# Patient Record
Sex: MALE | Race: WHITE | Employment: OTHER | URBAN - METROPOLITAN AREA
[De-identification: names, ages, dates, MRNs, and addresses within clinical notes are randomized per-mention and may not be internally consistent; named-entity substitution may affect disease eponyms.]

---

## 2020-07-18 ENCOUNTER — APPOINTMENT (OUTPATIENT)
Dept: GENERAL RADIOLOGY | Age: 85
DRG: 863 | End: 2020-07-18
Payer: MEDICARE

## 2020-07-18 ENCOUNTER — HOSPITAL ENCOUNTER (INPATIENT)
Age: 85
LOS: 3 days | Discharge: HOME | DRG: 863 | End: 2020-07-21
Attending: EMERGENCY MEDICINE | Admitting: INTERNAL MEDICINE
Payer: MEDICARE

## 2020-07-18 PROBLEM — L03.119 CELLULITIS OF HAND: Status: ACTIVE | Noted: 2020-07-18

## 2020-07-18 LAB
ANION GAP SERPL CALCULATED.3IONS-SCNC: 10 MMOL/L (ref 3–16)
BASOPHILS ABSOLUTE: 0.1 K/UL (ref 0–0.2)
BASOPHILS RELATIVE PERCENT: 0.5 %
BUN BLDV-MCNC: 34 MG/DL (ref 7–20)
C-REACTIVE PROTEIN: 98.5 MG/L (ref 0–5.1)
CALCIUM SERPL-MCNC: 10.1 MG/DL (ref 8.3–10.6)
CHLORIDE BLD-SCNC: 101 MMOL/L (ref 99–110)
CO2: 27 MMOL/L (ref 21–32)
CREAT SERPL-MCNC: 1.7 MG/DL (ref 0.8–1.3)
EOSINOPHILS ABSOLUTE: 0.3 K/UL (ref 0–0.6)
EOSINOPHILS RELATIVE PERCENT: 2.1 %
GFR AFRICAN AMERICAN: 46
GFR NON-AFRICAN AMERICAN: 38
GLUCOSE BLD-MCNC: 133 MG/DL (ref 70–99)
HCT VFR BLD CALC: 49.6 % (ref 40.5–52.5)
HEMOGLOBIN: 16.3 G/DL (ref 13.5–17.5)
INR BLD: 3.52 (ref 0.86–1.14)
LYMPHOCYTES ABSOLUTE: 1.2 K/UL (ref 1–5.1)
LYMPHOCYTES RELATIVE PERCENT: 7.4 %
MCH RBC QN AUTO: 27.9 PG (ref 26–34)
MCHC RBC AUTO-ENTMCNC: 32.9 G/DL (ref 31–36)
MCV RBC AUTO: 84.9 FL (ref 80–100)
MONOCYTES ABSOLUTE: 1 K/UL (ref 0–1.3)
MONOCYTES RELATIVE PERCENT: 6.4 %
NEUTROPHILS ABSOLUTE: 13.3 K/UL (ref 1.7–7.7)
NEUTROPHILS RELATIVE PERCENT: 83.6 %
PDW BLD-RTO: 15.2 % (ref 12.4–15.4)
PLATELET # BLD: 140 K/UL (ref 135–450)
PMV BLD AUTO: 7.8 FL (ref 5–10.5)
POTASSIUM REFLEX MAGNESIUM: 4.7 MMOL/L (ref 3.5–5.1)
PROTHROMBIN TIME: 41.3 SEC (ref 10–13.2)
RBC # BLD: 5.84 M/UL (ref 4.2–5.9)
SEDIMENTATION RATE, ERYTHROCYTE: 11 MM/HR (ref 0–20)
SODIUM BLD-SCNC: 138 MMOL/L (ref 136–145)
WBC # BLD: 15.9 K/UL (ref 4–11)

## 2020-07-18 PROCEDURE — 85025 COMPLETE CBC W/AUTO DIFF WBC: CPT

## 2020-07-18 PROCEDURE — 36415 COLL VENOUS BLD VENIPUNCTURE: CPT

## 2020-07-18 PROCEDURE — 99284 EMERGENCY DEPT VISIT MOD MDM: CPT

## 2020-07-18 PROCEDURE — 85610 PROTHROMBIN TIME: CPT

## 2020-07-18 PROCEDURE — 73090 X-RAY EXAM OF FOREARM: CPT

## 2020-07-18 PROCEDURE — 1200000000 HC SEMI PRIVATE

## 2020-07-18 PROCEDURE — 6360000002 HC RX W HCPCS: Performed by: STUDENT IN AN ORGANIZED HEALTH CARE EDUCATION/TRAINING PROGRAM

## 2020-07-18 PROCEDURE — 2580000003 HC RX 258: Performed by: STUDENT IN AN ORGANIZED HEALTH CARE EDUCATION/TRAINING PROGRAM

## 2020-07-18 PROCEDURE — 73070 X-RAY EXAM OF ELBOW: CPT

## 2020-07-18 PROCEDURE — 96365 THER/PROPH/DIAG IV INF INIT: CPT

## 2020-07-18 PROCEDURE — 2580000003 HC RX 258: Performed by: INTERNAL MEDICINE

## 2020-07-18 PROCEDURE — 80048 BASIC METABOLIC PNL TOTAL CA: CPT

## 2020-07-18 PROCEDURE — 6370000000 HC RX 637 (ALT 250 FOR IP): Performed by: INTERNAL MEDICINE

## 2020-07-18 PROCEDURE — 86140 C-REACTIVE PROTEIN: CPT

## 2020-07-18 PROCEDURE — 85652 RBC SED RATE AUTOMATED: CPT

## 2020-07-18 PROCEDURE — 73120 X-RAY EXAM OF HAND: CPT

## 2020-07-18 RX ORDER — SODIUM CHLORIDE 0.9 % (FLUSH) 0.9 %
10 SYRINGE (ML) INJECTION EVERY 12 HOURS SCHEDULED
Status: DISCONTINUED | OUTPATIENT
Start: 2020-07-18 | End: 2020-07-21 | Stop reason: HOSPADM

## 2020-07-18 RX ORDER — LANOLIN ALCOHOL/MO/W.PET/CERES
1000 CREAM (GRAM) TOPICAL DAILY
COMMUNITY

## 2020-07-18 RX ORDER — PROMETHAZINE HYDROCHLORIDE 12.5 MG/1
12.5 TABLET ORAL EVERY 6 HOURS PRN
Status: DISCONTINUED | OUTPATIENT
Start: 2020-07-18 | End: 2020-07-21 | Stop reason: HOSPADM

## 2020-07-18 RX ORDER — UREA 10 %
3 LOTION (ML) TOPICAL NIGHTLY PRN
Status: DISCONTINUED | OUTPATIENT
Start: 2020-07-18 | End: 2020-07-21 | Stop reason: HOSPADM

## 2020-07-18 RX ORDER — DIPHENHYDRAMINE HYDROCHLORIDE, ZINC ACETATE 2; .1 G/100G; G/100G
CREAM TOPICAL 3 TIMES DAILY PRN
Status: DISCONTINUED | OUTPATIENT
Start: 2020-07-18 | End: 2020-07-21 | Stop reason: HOSPADM

## 2020-07-18 RX ORDER — CALCIUM CARBONATE 200(500)MG
1 TABLET,CHEWABLE ORAL 3 TIMES DAILY PRN
COMMUNITY
End: 2022-01-11 | Stop reason: ALTCHOICE

## 2020-07-18 RX ORDER — PANTOPRAZOLE SODIUM 40 MG/1
40 TABLET, DELAYED RELEASE ORAL DAILY PRN
Status: DISCONTINUED | OUTPATIENT
Start: 2020-07-19 | End: 2020-07-21 | Stop reason: HOSPADM

## 2020-07-18 RX ORDER — ONDANSETRON 2 MG/ML
4 INJECTION INTRAMUSCULAR; INTRAVENOUS EVERY 6 HOURS PRN
Status: DISCONTINUED | OUTPATIENT
Start: 2020-07-18 | End: 2020-07-21 | Stop reason: HOSPADM

## 2020-07-18 RX ORDER — LEVOTHYROXINE SODIUM 0.12 MG/1
125 TABLET ORAL DAILY
Status: DISCONTINUED | OUTPATIENT
Start: 2020-07-19 | End: 2020-07-21 | Stop reason: HOSPADM

## 2020-07-18 RX ORDER — ACETAMINOPHEN 650 MG/1
650 SUPPOSITORY RECTAL EVERY 6 HOURS PRN
Status: DISCONTINUED | OUTPATIENT
Start: 2020-07-18 | End: 2020-07-21 | Stop reason: HOSPADM

## 2020-07-18 RX ORDER — SUVOREXANT 10 MG/1
10 TABLET, FILM COATED ORAL NIGHTLY PRN
COMMUNITY
End: 2022-01-11 | Stop reason: ALTCHOICE

## 2020-07-18 RX ORDER — LORAZEPAM 0.5 MG/1
0.5 TABLET ORAL NIGHTLY PRN
Status: DISCONTINUED | OUTPATIENT
Start: 2020-07-18 | End: 2020-07-21 | Stop reason: HOSPADM

## 2020-07-18 RX ORDER — LANOLIN ALCOHOL/MO/W.PET/CERES
3 CREAM (GRAM) TOPICAL NIGHTLY PRN
COMMUNITY
End: 2022-01-11 | Stop reason: ALTCHOICE

## 2020-07-18 RX ORDER — POLYETHYLENE GLYCOL 3350 17 G/17G
17 POWDER, FOR SOLUTION ORAL DAILY PRN
Status: DISCONTINUED | OUTPATIENT
Start: 2020-07-18 | End: 2020-07-21 | Stop reason: HOSPADM

## 2020-07-18 RX ORDER — FUROSEMIDE 40 MG/1
40 TABLET ORAL DAILY
Status: DISCONTINUED | OUTPATIENT
Start: 2020-07-18 | End: 2020-07-19

## 2020-07-18 RX ORDER — FINASTERIDE 5 MG/1
5 TABLET, FILM COATED ORAL DAILY
Status: DISCONTINUED | OUTPATIENT
Start: 2020-07-19 | End: 2020-07-21 | Stop reason: HOSPADM

## 2020-07-18 RX ORDER — ACETAMINOPHEN 500 MG
500 TABLET ORAL NIGHTLY PRN
COMMUNITY
End: 2022-01-11 | Stop reason: ALTCHOICE

## 2020-07-18 RX ORDER — LANSOPRAZOLE 15 MG/1
15 CAPSULE, DELAYED RELEASE ORAL DAILY PRN
Status: ON HOLD | COMMUNITY
End: 2021-02-27

## 2020-07-18 RX ORDER — OYSTER SHELL CALCIUM WITH VITAMIN D 500; 200 MG/1; [IU]/1
1 TABLET, FILM COATED ORAL DAILY
COMMUNITY

## 2020-07-18 RX ORDER — LANOLIN ALCOHOL/MO/W.PET/CERES
1000 CREAM (GRAM) TOPICAL DAILY
Status: DISCONTINUED | OUTPATIENT
Start: 2020-07-19 | End: 2020-07-21 | Stop reason: HOSPADM

## 2020-07-18 RX ORDER — CALCIUM CARBONATE 200(500)MG
1 TABLET,CHEWABLE ORAL 3 TIMES DAILY PRN
Status: DISCONTINUED | OUTPATIENT
Start: 2020-07-18 | End: 2020-07-21 | Stop reason: HOSPADM

## 2020-07-18 RX ORDER — FEBUXOSTAT 80 MG/1
80 TABLET, FILM COATED ORAL DAILY PRN
COMMUNITY
End: 2022-01-11 | Stop reason: ALTCHOICE

## 2020-07-18 RX ORDER — OMEGA-3S/DHA/EPA/FISH OIL/D3 300MG-1000
400 CAPSULE ORAL DAILY
Status: DISCONTINUED | OUTPATIENT
Start: 2020-07-19 | End: 2020-07-21 | Stop reason: HOSPADM

## 2020-07-18 RX ORDER — ACETAMINOPHEN 325 MG/1
650 TABLET ORAL EVERY 6 HOURS PRN
Status: DISCONTINUED | OUTPATIENT
Start: 2020-07-18 | End: 2020-07-21 | Stop reason: HOSPADM

## 2020-07-18 RX ORDER — SODIUM CHLORIDE 0.9 % (FLUSH) 0.9 %
10 SYRINGE (ML) INJECTION PRN
Status: DISCONTINUED | OUTPATIENT
Start: 2020-07-18 | End: 2020-07-21 | Stop reason: HOSPADM

## 2020-07-18 RX ADMIN — VANCOMYCIN HYDROCHLORIDE 1000 MG: 10 INJECTION, POWDER, LYOPHILIZED, FOR SOLUTION INTRAVENOUS at 14:55

## 2020-07-18 RX ADMIN — ACETAMINOPHEN 650 MG: 325 TABLET ORAL at 20:39

## 2020-07-18 RX ADMIN — LORAZEPAM 0.5 MG: 0.5 TABLET ORAL at 23:21

## 2020-07-18 RX ADMIN — Medication 10 ML: at 20:40

## 2020-07-18 RX ADMIN — Medication 3 MG: at 20:38

## 2020-07-18 ASSESSMENT — ENCOUNTER SYMPTOMS
COUGH: 0
ALLERGIC/IMMUNOLOGIC NEGATIVE: 1
EYES NEGATIVE: 1
SHORTNESS OF BREATH: 0
GASTROINTESTINAL NEGATIVE: 1

## 2020-07-18 ASSESSMENT — PAIN SCALES - GENERAL
PAINLEVEL_OUTOF10: 0
PAINLEVEL_OUTOF10: 0
PAINLEVEL_OUTOF10: 3

## 2020-07-18 ASSESSMENT — PAIN DESCRIPTION - ONSET: ONSET: ON-GOING

## 2020-07-18 ASSESSMENT — PAIN DESCRIPTION - PROGRESSION: CLINICAL_PROGRESSION: NOT CHANGED

## 2020-07-18 ASSESSMENT — PAIN DESCRIPTION - PAIN TYPE: TYPE: ACUTE PAIN

## 2020-07-18 ASSESSMENT — PAIN DESCRIPTION - LOCATION: LOCATION: HAND

## 2020-07-18 ASSESSMENT — PAIN DESCRIPTION - FREQUENCY: FREQUENCY: CONTINUOUS

## 2020-07-18 ASSESSMENT — PAIN DESCRIPTION - DESCRIPTORS: DESCRIPTORS: ACHING

## 2020-07-18 ASSESSMENT — PAIN - FUNCTIONAL ASSESSMENT: PAIN_FUNCTIONAL_ASSESSMENT: PREVENTS OR INTERFERES SOME ACTIVE ACTIVITIES AND ADLS

## 2020-07-18 ASSESSMENT — PAIN DESCRIPTION - ORIENTATION: ORIENTATION: LEFT

## 2020-07-18 NOTE — PLAN OF CARE
Problem: Pain:  Goal: Patient's pain/discomfort is manageable  Description: Patient's pain/discomfort is manageable  Outcome: Ongoing   Patient denies pain at this time. LUE elevated on pillows for comfort. Will continue to assess and monitor. Problem: Falls - Risk of:  Goal: Will remain free from falls  Description: Will remain free from falls  Outcome: Ongoing   All fall precautions in place. Bed locked and in lowest position with alarm on. Over-bed table and personal belongings within reach. Patient instructed to use call light for assistance and educated on importance of fall precautions. Non-skid socks on. Will continue to monitor.

## 2020-07-18 NOTE — PROGRESS NOTES
4 Eyes Admission Assessment     I agree as the admission nurse that 2 RN's have performed a thorough Head to Toe Skin Assessment on the patient. ALL assessment sites listed below have been assessed on admission. Areas assessed by both nurses:  [x]   Head, Face, and Ears   [x]   Shoulders, Back, and Chest  [x]   Arms, Elbows, and Hands   [x]   Coccyx, Sacrum, and Ischum  [x]   Legs, Feet, and Heels        Does the Patient have Skin Breakdown?   .   Cellulitis to L hand per admission  Abrasion to R knee  Abrasion to R forehead  Scattered bruising to BUE      Dryness in spots to back    Henrique Prevention initiated:  NA   Wound Care Orders initiated:  NA      WOC nurse consulted for Pressure Injury (Stage 3,4, Unstageable, DTI, NWPT, and Complex wounds):  NA      Nurse 1 eSignature: Electronically signed by Mariza Doherty RN on 7/18/20 at 4:00 PM EDT    **SHARE this note so that the co-signing nurse is able to place an eSignature**    Nurse 2 eSignature: Electronically signed by Norvin Siemens, RN on 7/18/20 at 4:41 PM EDT

## 2020-07-18 NOTE — PROGRESS NOTES
Patient alert and oriented x4, VSS on room air, neuro status WNL. LUE is red and swollen, fingers are cool, +2 radial pulse, patient reports tenderness to touch. Patient denies N/V. Patient up to bathroom x1 SBA with GB. Tolerating ambulation well. All fall precautions in place. Will continue to monitor.

## 2020-07-18 NOTE — PROGRESS NOTES
List of Home Medications the patient is currently taking is complete. Source of medications in list is recent MD visits, [employer] fill history and patient interview. The following medications were added to admission medication list:  - Febuxostat 80 mg daily PRN  - Melatonin 3 mg HS PRN  - APAP 500 mg HS PRN  - B12 1000 mcg daily  - D3 400 units daily  - Lansoprazole 15 mg daily PRN  - CoQ10 300 mg daily  - Suvorexant 10 mg nightly PRN  - Tums TID PRN    The following medications were removed from admission medication list:  - Amlodipine 5 mg daily  - Carvedilol 6.25 mg BID  - Lisinopril 20 daily  - Trazodone 50 mg nightly PRN    The following medication instructions/doses were adjusted to reflect how patient is taking:  - Eszopiclone 2 mg nightly PRN adjusted to 2-4 mg nightly PRN  - Furosemide 40 mg BID adjusted to 40 mg BID PRN  - Levothyroxine 100 mcg adjusted to 125 mcg daily    Please note:  - Patient reported taking all medications today, including warfarin. Please call with questions.   Lisa Colunga, PharmD, [facility]  Main pharmacy: [id number]  7/18/2020 3:01 PM

## 2020-07-18 NOTE — CONSULTS
Clinical Pharmacy Consult Note    Admit date: 7/18/2020    Subjective/Objective:  80 yom with PMHx significant for BPH, pacemaker, HTN, hypothyroidism, gout, Afib on warfarin, CKD3, and ILD presented with c/o cellulitis infection on his L arm. Patient reportedly saw dermatologist last Wednesday where he had two excisional biopsies done on potential squamous cell carcinoma lesions on his R lateral knee and R forehead. Pharmacy is consulted to dose Vancomycin and Warfarin per Dr. Angle Chaudhari    Pertinent Medications:  Vancomycin 1g IV q24h -- day # 1    Home anticoagulation regimen:  Warfarin 5 mg daily (per patient interview)    Date INR Warfarin Dose   7/18 3.52 5 mg (taken at home)                      Recent Labs     07/18/20  1405      K 4.7      CO2 27   BUN 34*   CREATININE 1.7*       CrCl cannot be calculated (Unknown ideal weight. ). Recent Labs     07/18/20  1405   WBC 15.9*   HGB 16.3   HCT 49.6   MCV 84.9          Assessment/Plan:  1. L arm cellulitis  :  vancomycin day #1  Vancomycin - pharmacy to dose  · Patient received vancomycin 1g in ED today. Will start 1g IV q24h to begin tomorrow. Provides ~ 12 mg/kg and is based on a half-life elimination of 22 hours. · Trough will be ordered when appropriate. Target 10-15 mcg/mL for cellulitis. · Renal function will be monitored closely and dosing will be adjusted as appropriate. 2. Anticoagulation:  Atrial fibrillation (INR goal 2-3)  · Home regimen: warfarin 5 mg daily; patient took dose today per interview  · INR today = 3.52, supratherapeutic  · Will order warfarin placeholder. · Medication profile reviewed for potential drug interactions. No significant drug interactions with warfarin noted. · Daily INR will be monitored and dose adjustments made as needed. Please call with any questions.   Richard Castellano, PharmD, BCPS  Main pharmacy: [id number]  7/18/2020 3:33 PM

## 2020-07-18 NOTE — ED PROVIDER NOTES
1 [facility]  EMERGENCY DEPARTMENT ENCOUNTER          [facility] RESIDENT NOTE       Date of evaluation: 7/18/2020    Chief Complaint     Hand Pain (redness, swelling to left had after dermatology procedure on Wednesday)      History of Present Illness     Apryl Yun is a 80 y.o. male who presents c/o cellulitis infection on his L arm. He reports that saw his dermatologist last wed where he had two excisional biopsies done on potential squamous cell carcinma lesions on his R lateral knee and R forehead, as well as a cryotherpathy treatment for a lesion over his L second knuckle. Everyday since then he has reported that the L arm has gotten a little more red and swollen and has become hot to touch. He went back to the dermatologist several times since this started happening and was prescribed a z-pack yesterday. He took one 250mg tablet last night, forgetting to take the second dose, so he took it this morning. Any movement of his L hand or pressure over the redness causes pain. Nothing has made it better. He has a PMH of BPH, pacemaker, HTN, hypothyroidism, gout, HLP, afib, CKD stage 3, ILD. Review of Systems     Review of Systems   Constitutional: Negative for chills and fever. HENT: Negative. Eyes: Negative. Respiratory: Negative for cough and shortness of breath. Cardiovascular: Negative for chest pain and palpitations. Gastrointestinal: Negative. Endocrine: Negative. Genitourinary: Negative. Musculoskeletal: Positive for joint swelling. Negative for arthralgias, myalgias, neck pain and neck stiffness. Allergic/Immunologic: Negative. Neurological: Negative. Negative for weakness and numbness. All other systems reviewed and are negative.       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 He has a past surgical history that includes Spine surgery (2007); Skin cancer excision; Tibia fracture surgery; and Skin cancer excision (2012). His family history is not on file. He reports that he quit smoking about 57 years ago. His smoking use included cigarettes. He has a 10.00 pack-year smoking history. He has never used smokeless tobacco. He reports current alcohol use. He reports that he does not use drugs. Medications     Previous Medications    ACETAMINOPHEN (TYLENOL) 500 MG TABLET    Take 500 mg by mouth nightly as needed for Pain    CALCIUM CARBONATE (TUMS) 500 MG CHEWABLE TABLET    Take 1 tablet by mouth 3 times daily as needed for Heartburn    CALCIUM-VITAMIN D (OSCAL-500) 500-200 MG-UNIT PER TABLET    Take 1 tablet by mouth daily    CHOLECALCIFEROL 400 UNIT TABS TABLET    Take 400 Units by mouth daily    COENZYME Q10 (EQL COQ10) 300 MG CAPS    Take 300 mg by mouth daily    ESZOPICLONE (LUNESTA) 2 MG TABS    Take 1 tablet by mouth nightly    FEBUXOSTAT 80 MG TABS    Take 80 mg by mouth daily as needed    FINASTERIDE (PROSCAR) 5 MG TABLET    Take 5 mg by mouth daily     FISH OIL    Take 500 mg by mouth daily     FUROSEMIDE (LASIX) 40 MG TABLET    TAKE 1 TABLET BY MOUTH TWICE DAILY    GLUCOSE BLOOD VI TEST STRIPS (FREESTYLE LITE) STRIP    Test once daily    LANSOPRAZOLE (PREVACID) 15 MG DELAYED RELEASE CAPSULE    Take 15 mg by mouth daily as needed    LEVOTHYROXINE (SYNTHROID) 100 MCG TABLET    Take 1 tablet by mouth daily. MELATONIN 3 MG TABS TABLET    Take 3 mg by mouth nightly as needed    MOMETASONE (NASONEX) 50 MCG/ACT NASAL SPRAY    2 sprays by Nasal route daily. MULTIPLE VITAMIN (MULTIVITAMIN PO)    Take 1 tablet by mouth     SIMVASTATIN (ZOCOR) 10 MG TABLET    Take 1 tablet by mouth nightly. SUVOREXANT (BELSOMRA) 10 MG TABS    Take 10 mg by mouth nightly as needed.     TEMAZEPAM (RESTORIL) 15 MG CAPSULE    TAKE ONE CAPSULE BY MOUTH EVERY NIGHT AT BEDTIME AS NEEDED fist due to the swelling and pain. Neurological:      General: No focal deficit present. Mental Status: He is alert and oriented to person, place, and time. Mental status is at baseline. Cranial Nerves: No cranial nerve deficit. Motor: No weakness. Gait: Gait normal.   Psychiatric:         Mood and Affect: Mood normal.         Diagnostic Results     RADIOLOGY:  XR ELBOW LEFT (2 VIEWS)   Final Result      1. No acute osseous findings. 2.  Diffuse swelling. XR RADIUS ULNA LEFT (2 VIEWS)   Final Result      1. No acute osseous findings. 2.  Diffuse swelling. XR HAND LEFT (2 VIEWS)   Final Result      1. No acute osseous findings. 2.  Diffuse swelling.           LABS:   Results for orders placed or performed during the hospital encounter of 07/18/20   CBC Auto Differential   Result Value Ref Range    WBC 15.9 (H) 4.0 - 11.0 K/uL    RBC 5.84 4.20 - 5.90 M/uL    Hemoglobin 16.3 13.5 - 17.5 g/dL    Hematocrit 49.6 40.5 - 52.5 %    MCV 84.9 80.0 - 100.0 fL    MCH 27.9 26.0 - 34.0 pg    MCHC 32.9 31.0 - 36.0 g/dL    RDW 15.2 12.4 - 15.4 %    Platelets 308 874 - 754 K/uL    MPV 7.8 5.0 - 10.5 fL    Neutrophils % 83.6 %    Lymphocytes % 7.4 %    Monocytes % 6.4 %    Eosinophils % 2.1 %    Basophils % 0.5 %    Neutrophils Absolute 13.3 (H) 1.7 - 7.7 K/uL    Lymphocytes Absolute 1.2 1.0 - 5.1 K/uL    Monocytes Absolute 1.0 0.0 - 1.3 K/uL    Eosinophils Absolute 0.3 0.0 - 0.6 K/uL    Basophils Absolute 0.1 0.0 - 0.2 K/uL   Basic Metabolic Panel w/ Reflex to MG   Result Value Ref Range    Sodium 138 136 - 145 mmol/L    Potassium reflex Magnesium 4.7 3.5 - 5.1 mmol/L    Chloride 101 99 - 110 mmol/L    CO2 27 21 - 32 mmol/L    Anion Gap 10 3 - 16    Glucose 133 (H) 70 - 99 mg/dL    BUN 34 (H) 7 - 20 mg/dL    CREATININE 1.7 (H) 0.8 - 1.3 mg/dL    GFR Non- 38 (A) >60    GFR  46 (A) >60    Calcium 10.1 8.3 - 10.6 mg/dL         RECENT VITALS:  BP: (!) 159/80, Temp: 98.2 °F (36.8 °C),Pulse: 62, Resp: 18, SpO2: 97 %       ED Course     Nursing Notes, Past Medical Hx, Past Surgical Hx, Social Hx, Allergies, and FamilyHx were reviewed. The patient was giventhe following medications:  Orders Placed This Encounter   Medications    vancomycin (VANCOCIN) 1,000 mg in dextrose 5 % 250 mL IVPB       CONSULTS:  IP CONSULT TO HOSPITALIST  IP CONSULT TO ORTHOPEDIC SURGERY  IP CONSULT  W [address] [place] [address] / ASSESSMENT / Rj Hamman is a 80 y.o. male c/o cellulitis infection on his L arm which started after he got a cryotherapy treatment for a possible squamous cell carcinoma lesion last wed. Everyday since then he has reported that the L arm has gotten a little more red and swollen and has become hot to touch. Dermatologist prescribed a z-pack yesterday. He took one 250mg tablet last night, forgetting to take the second dose, so he took it this morning. Any movement of his L hand or pressure over the redness causes pain. Pt denies fevers, chills, night sweats. He has a PMH of BPH, pacemaker, HTN, hypothyroidism, gout, HLP, afib, CKD stage 3, ILD. Today he is afebrile and hemo stable. On physical exam pt L hand is swollen and erythematous. The rash begins in the L hand and progresses throughout the dorsal aspect of his hand and fingers, progressing up his dorsal forearm and half way up his biceps. There is an ulcerative lesion over the L second finger MTP jt where the cryotherapy was done. Pt has 2+ radial pulse in L hand, no numbness or tingling, is hot to touch, and has almost full ROM with the exception being unable to make a fully enclosed fist due to swelling and pain. I have marked and dated the border of erythema. At this time there doesn't appear to be any concern for compartment syndrome and infectious tenosynovitis. I have ordered a CBC to evaluate for leukocytosis, a BMP to evaluate for renal function due to hx CKD stage 3.  Pt has a leukocytosis of 15.9, BUN of 39, Cr 1.7 and GFr of 38. Xrays of L hand, forearm and elbow reveal no gas in tissues, necrotizing fasciitis unlikely. Pt allergic to penicillin (believes anaphylactic), as well as cephalosporins, sulfa drugs, and ciprofloxin, but pt unsure of what reactions he has to these. I have started him on 1g IV vancomycin and will consult hospitalist and orthopedic surgery to get a hand surgeon on treatment team. Hospitalist agreed to admit pt. Dr. Enrique Richmond has agreed to follow pt as orthopedic surgeon. Pt agreed with plan. This patient was also evaluated by the attending physician. All care plans were discussed and agreed upon. Clinical Impression     1.  Cellulitis of left hand        Disposition     PATIENT REFERRED TO:  Deyvi Blanco MD  [address] [place] [address]  [phone]            DISCHARGE MEDICATIONS:  New Prescriptions    No medications on file       DISPOSITION Admitted 07/18/2020 02:55:07 PM      Braeden Krishnamurthy,   07/18/20 [id number]

## 2020-07-19 LAB
ANION GAP SERPL CALCULATED.3IONS-SCNC: 9 MMOL/L (ref 3–16)
BUN BLDV-MCNC: 34 MG/DL (ref 7–20)
CALCIUM SERPL-MCNC: 9.6 MG/DL (ref 8.3–10.6)
CHLORIDE BLD-SCNC: 103 MMOL/L (ref 99–110)
CO2: 25 MMOL/L (ref 21–32)
CREAT SERPL-MCNC: 2 MG/DL (ref 0.8–1.3)
GFR AFRICAN AMERICAN: 38
GFR NON-AFRICAN AMERICAN: 32
GLUCOSE BLD-MCNC: 113 MG/DL (ref 70–99)
HCT VFR BLD CALC: 46.9 % (ref 40.5–52.5)
HEMOGLOBIN: 15.4 G/DL (ref 13.5–17.5)
INR BLD: 3.25 (ref 0.86–1.14)
MCH RBC QN AUTO: 27.9 PG (ref 26–34)
MCHC RBC AUTO-ENTMCNC: 32.8 G/DL (ref 31–36)
MCV RBC AUTO: 84.9 FL (ref 80–100)
PDW BLD-RTO: 15.2 % (ref 12.4–15.4)
PLATELET # BLD: 137 K/UL (ref 135–450)
PMV BLD AUTO: 7.8 FL (ref 5–10.5)
POTASSIUM REFLEX MAGNESIUM: 4.4 MMOL/L (ref 3.5–5.1)
PROTHROMBIN TIME: 38.1 SEC (ref 10–13.2)
RBC # BLD: 5.52 M/UL (ref 4.2–5.9)
SODIUM BLD-SCNC: 137 MMOL/L (ref 136–145)
WBC # BLD: 14.5 K/UL (ref 4–11)

## 2020-07-19 PROCEDURE — 6370000000 HC RX 637 (ALT 250 FOR IP): Performed by: INTERNAL MEDICINE

## 2020-07-19 PROCEDURE — 87070 CULTURE OTHR SPECIMN AEROBIC: CPT

## 2020-07-19 PROCEDURE — 85610 PROTHROMBIN TIME: CPT

## 2020-07-19 PROCEDURE — 87040 BLOOD CULTURE FOR BACTERIA: CPT

## 2020-07-19 PROCEDURE — 86403 PARTICLE AGGLUT ANTBDY SCRN: CPT

## 2020-07-19 PROCEDURE — 1200000000 HC SEMI PRIVATE

## 2020-07-19 PROCEDURE — 87186 SC STD MICRODIL/AGAR DIL: CPT

## 2020-07-19 PROCEDURE — 87077 CULTURE AEROBIC IDENTIFY: CPT

## 2020-07-19 PROCEDURE — 6360000002 HC RX W HCPCS: Performed by: INTERNAL MEDICINE

## 2020-07-19 PROCEDURE — 36415 COLL VENOUS BLD VENIPUNCTURE: CPT

## 2020-07-19 PROCEDURE — 85027 COMPLETE CBC AUTOMATED: CPT

## 2020-07-19 PROCEDURE — 2580000003 HC RX 258: Performed by: INTERNAL MEDICINE

## 2020-07-19 PROCEDURE — 87205 SMEAR GRAM STAIN: CPT

## 2020-07-19 PROCEDURE — 80048 BASIC METABOLIC PNL TOTAL CA: CPT

## 2020-07-19 RX ORDER — ZOLPIDEM TARTRATE 5 MG/1
5 TABLET ORAL ONCE
Status: COMPLETED | OUTPATIENT
Start: 2020-07-19 | End: 2020-07-19

## 2020-07-19 RX ORDER — ZOLPIDEM TARTRATE 5 MG/1
5 TABLET ORAL NIGHTLY PRN
Status: DISCONTINUED | OUTPATIENT
Start: 2020-07-20 | End: 2020-07-19

## 2020-07-19 RX ORDER — WARFARIN SODIUM 5 MG/1
2.5 TABLET ORAL
Status: COMPLETED | OUTPATIENT
Start: 2020-07-19 | End: 2020-07-19

## 2020-07-19 RX ADMIN — CHOLECALCIFEROL (VITAMIN D3) 10 MCG (400 UNIT) TABLET 400 UNITS: at 08:13

## 2020-07-19 RX ADMIN — LORAZEPAM 0.5 MG: 0.5 TABLET ORAL at 20:37

## 2020-07-19 RX ADMIN — FINASTERIDE 5 MG: 5 TABLET, FILM COATED ORAL at 08:13

## 2020-07-19 RX ADMIN — Medication 10 ML: at 08:13

## 2020-07-19 RX ADMIN — POLYETHYLENE GLYCOL 3350 17 G: 17 POWDER, FOR SOLUTION ORAL at 14:34

## 2020-07-19 RX ADMIN — LEVOTHYROXINE SODIUM 125 MCG: 0.12 TABLET ORAL at 08:12

## 2020-07-19 RX ADMIN — CYANOCOBALAMIN TAB 1000 MCG 1000 MCG: 1000 TAB at 08:13

## 2020-07-19 RX ADMIN — VANCOMYCIN HYDROCHLORIDE 1000 MG: 10 INJECTION, POWDER, LYOPHILIZED, FOR SOLUTION INTRAVENOUS at 14:35

## 2020-07-19 RX ADMIN — WARFARIN SODIUM 2.5 MG: 5 TABLET ORAL at 17:28

## 2020-07-19 RX ADMIN — Medication 10 ML: at 21:36

## 2020-07-19 RX ADMIN — ZOLPIDEM TARTRATE 5 MG: 5 TABLET ORAL at 23:14

## 2020-07-19 ASSESSMENT — PAIN SCALES - GENERAL: PAINLEVEL_OUTOF10: 0

## 2020-07-19 NOTE — PROGRESS NOTES
Hospitalist Progress Note      PCP: Frida Figueredo MD    Date of Admission: 7/18/2020    Chief Complaint: Left arm pain and swelling    Hospital Course: Admitted for left arm and swelling secondary to cellulitis. Due to patient history of allergies to antibiotics, patient is on IV vancomycin. Orthopedic following    Subjective: Pt doing ok. Denies fever, chills. Still has left arm swelling/redness      Medications:  Reviewed    Infusion Medications   Scheduled Medications    vancomycin  1,000 mg Intravenous Q24H    warfarin (COUMADIN) daily dosing (placeholder)   Other See Admin Instructions    vitamin D3  400 Units Oral Daily    finasteride  5 mg Oral Daily    Coenzyme Q10  300 mg Oral Daily    levothyroxine  125 mcg Oral Daily    vitamin B-12  1,000 mcg Oral Daily    sodium chloride flush  10 mL Intravenous 2 times per day     PRN Meds: calcium carbonate, LORazepam, melatonin, Suvorexant, pantoprazole, sodium chloride flush, acetaminophen **OR** acetaminophen, polyethylene glycol, promethazine **OR** ondansetron, diphenhydrAMINE-zinc acetate      Intake/Output Summary (Last 24 hours) at 7/19/2020 0844  Last data filed at 7/19/2020 0411  Gross per 24 hour   Intake 360 ml   Output --   Net 360 ml       Physical Exam Performed:    /88   Pulse 62   Temp 97.7 °F (36.5 °C) (Oral)   Resp 16   Ht 6' (1.829 m)   Wt 195 lb 1.7 oz (88.5 kg)   SpO2 98%   BMI 26.46 kg/m²     General appearance:  No apparent distress, appears stated age and cooperative. HEENT:  Normal cephalic, atraumatic without obvious deformity. Pupils equal, round, and reactive to light. Extra ocular muscles intact. Conjunctivae/corneas clear. Neck: Supple, with full range of motion. No jugular venous distention. Trachea midline. Respiratory:  Normal respiratory effort. Clear to auscultation, bilaterally without Rales/Wheezes/Rhonchi.   Cardiovascular:  Regular rate and rhythm with normal S1/S2 without murmurs, rubs or gallops. Abdomen: Soft, non-tender, non-distended with normal bowel sounds. Musculoskeletal: Left hand and arm swelling, tenderness, erythema noted, stable. Unable to make left hand fist  Skin: Skin color, texture, turgor normal.  No rashes or lesions. Neurologic:  Neurovascularly intact without any focal sensory/motor deficits. Cranial nerves: II-XII intact, grossly non-focal.  Psychiatric:  Alert and oriented, thought content appropriate, normal insight  Capillary Refill: Brisk,< 3 seconds   Peripheral Pulses: +2 palpable, equal       Labs:   Recent Labs     07/18/20  1405 07/19/20  0459   WBC 15.9* 14.5*   HGB 16.3 15.4   HCT 49.6 46.9    137     Recent Labs     07/18/20  1405 07/19/20  0459    137   K 4.7 4.4    103   CO2 27 25   BUN 34* 34*   CREATININE 1.7* 2.0*   CALCIUM 10.1 9.6     No results for input(s): AST, ALT, BILIDIR, BILITOT, ALKPHOS in the last 72 hours. Recent Labs     07/18/20  1528 07/19/20  0459   INR 3.52* 3.25*     No results for input(s): CKTOTAL, TROPONINI in the last 72 hours. Urinalysis:    No results found for: April Sanchez, BACTERIA, RBCUA, BLOODU, SPECGRAV, [address]    Radiology:  XR ELBOW LEFT (2 VIEWS)   Final Result      1. No acute osseous findings. 2.  Diffuse swelling. XR RADIUS ULNA LEFT (2 VIEWS)   Final Result      1. No acute osseous findings. 2.  Diffuse swelling. XR HAND LEFT (2 VIEWS)   Final Result      1. No acute osseous findings. 2.  Diffuse swelling.               Assessment/Plan:     Cellulitis of left hand:  Likely precipitated by cryotherapy of left second ankle  Blood and wound culture pending  Continue IV antibiotic  Orthopedic following     Atrial fibrillation:  Heart rate controlled   INR supratherapeutic, monitor INR  Cont to hold warfarin, pharmacy following     Hypertension:  Monitor blood pressure  Takes Lasix 40 mg twice daily as needed  S/p one dose lasix 40 mg PO X1     Hypothyroidism:  Continue

## 2020-07-19 NOTE — PLAN OF CARE
Problem: Safety:  Goal: Free from accidental physical injury  Description: Free from accidental physical injury  Outcome: Ongoing   All fall precautions in place. Bed locked and in lowest position with alarm on. Over-bed table and personal belongings within reach. Patient instructed to use call light for assistance. Non-skids socks on. Will continue to monitor. Problem: Pain:  Goal: Patient's pain/discomfort is manageable  Description: Patient's pain/discomfort is manageable  7/19/2020 0937 by Star Judd RN  Outcome: Ongoing   Patient reports pain/tenderness to LUE when touched. Limb elevated on pillows.

## 2020-07-19 NOTE — CONSULTS
Department of Orthopedic Surgery  Attending   Consult Note        Reason for Consult: Left dorsal hand cellulitis  Requesting Physician: Kelvin Webb MD  Date of Service: 7/19/2020 9:28 AM    CHIEF COMPLAINT:  As Above    History Obtained From:  patient    HISTORY OF PRESENT ILLNESS:                The patient is a 80 y.o. male who presents with above chief complaint. He is a right-hand-dominant gentleman who unfortunately recently lost his wife. He has significant medical comorbidities including pacemaker placement for atrial fibrillation and Coumadin usage. 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 He also went excision of several other lesions as well. He developed redness slight drainage and signs of infection. He was initially placed on azithromycin by the dermatologist.  He did not improve but clinically worsened. Presented to the emergency room yesterday. Has been placed on  vancomycin. He reports feeling somewhat better this morning    Past Medical History:        Diagnosis Date    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  Atrial fibrillation ([address] 2006    on event monitor    BPH (benign prostatic hyperplasia)     Gout     HTN (hypertension)     Hyperlipidemia     Hypothyroidism     Pacemaker 2006     Past Surgical History:        Procedure Laterality Date    SKIN CANCER EXCISION      SKIN CANCER EXCISION  2012    melanoma in [address]  2007    L4-5    TIBIA FRACTURE SURGERY           Medications Prior to Admission:   Prior to Admission medications    Medication Sig Start Date End Date Taking?  Authorizing Provider   calcium-vitamin D (OSCAL-500) 500-200 MG-UNIT per tablet Take 1 tablet by mouth daily   Yes Historical Provider, MD   Febuxostat 80 MG TABS Take 80 mg by mouth daily as needed   Yes Historical Provider, MD   melatonin 3 MG TABS tablet Take 3 mg by mouth nightly as needed   Yes Historical Provider, MD   acetaminophen (TYLENOL) 500 MG tablet Take 500 mg by mouth nightly as needed for Pain   Yes Historical Provider, MD   vitamin B-12 (CYANOCOBALAMIN) 1000 MCG tablet Take 1,000 mcg by mouth daily   Yes Historical Provider, MD   Cholecalciferol 400 UNIT TABS tablet Take 400 Units by mouth daily   Yes Historical Provider, MD   lansoprazole (PREVACID) 15 MG delayed release capsule Take 15 mg by mouth daily as needed   Yes Historical Provider, MD   Coenzyme Q10 (EQL COQ10) 300 MG CAPS Take 300 mg by mouth daily   Yes Historical Provider, MD   calcium carbonate (TUMS) 500 MG chewable tablet Take 1 tablet by mouth 3 times daily as needed for Heartburn   Yes Historical Provider, MD   Suvorexant (BELSOMRA) 10 MG TABS Take 10 mg by mouth nightly as needed. Yes Historical Provider, MD   eszopiclone (LUNESTA) 2 MG TABS Take 1 tablet by mouth nightly  Patient taking differently: Take 2-4 mg by mouth nightly. 4/30/15  Yes Darren León MD   temazepam (RESTORIL) 15 MG capsule TAKE ONE CAPSULE BY MOUTH EVERY NIGHT AT BEDTIME AS NEEDED FOR SLEEP 4/1/15  Yes Darren León MD   testosterone enanthate (DELATESTRYL) 200 MG/ML injection USE AS DIRECTED, INJECTING EVERY 2 WEEKS 3/2/15  Yes Historical Provider, MD   levothyroxine (SYNTHROID) 100 MCG tablet Take 1 tablet by mouth daily. Patient taking differently: Take 125 mcg by mouth daily  3/8/14  Yes Darren León MD   warfarin (COUMADIN) 5 MG tablet 5 mg every day 2/12/14  Yes Darren León MD   simvastatin (ZOCOR) 10 MG tablet Take 1 tablet by mouth nightly. 9/13/12  Yes Darren León MD   Multiple Vitamin (MULTIVITAMIN PO) Take 1 tablet by mouth    Yes Historical Provider, MD   FISH OIL Take 500 mg by mouth daily    Yes Historical Provider, MD   finasteride (PROSCAR) 5 MG tablet Take 5 mg by mouth daily  2/7/11  Yes Historical Provider, MD   mometasone (NASONEX) 50 MCG/ACT nasal spray 2 sprays by Nasal route daily.  4/1/15   Emily Mar Diana Hernandez MD   furosemide (LASIX) 40 MG tablet TAKE 1 TABLET BY MOUTH TWICE DAILY  Patient taking differently: 40 mg 2 times daily as needed  1/19/15   Deyvi Blanco MD   glucose blood VI test strips (FREESTYLE LITE) strip Test once daily 12/14/12   Deyvi Blanco MD       Allergies:  Cephalosporins; Ciprofloxacin; Iodides; Penicillins; Sulfa antibiotics; and Sulfamethoxazole-trimethoprim    Social History:    Tobacco:  reports that he quit smoking about 57 years ago. His smoking use included cigarettes. He has a 10.00 pack-year smoking history. He has never used smokeless tobacco.   Alcohol:  reports current alcohol use. Illicit Drug: No  Family History:       Problem Relation Age of Onset    Other Sister         Rheumatic fever       REVIEW OF SYSTEMS:    CONSTITUTIONAL:  negative  MUSCULOSKELETAL:  positive for  pain  All other systems reviewed and negative    PHYSICAL EXAM:    awake, alert, cooperative, no apparent distress, and appears stated age  MUSCULOSKELETAL:  There is no redness, warmth, or swelling of the joints. Full range of motion noted. Motor strength is 5 out of 5 all extremities bilaterally. Tone is normal.  The exception to this with the dorsum of the left hand, he had a small 2 x 2 centimeter lesion of necrotic appearing skin. I removed this gently with some gauze. There was well appearing granulation tissue. There is appreciable layer of subcutaneous edema. No focal purulence or loculated areas on palpation. There is some erythema extending up to the proximal forearm. It is certainly not extended beyond the markings and in fact retreated. He has a normal  Motor function of the radial, median ulnar nerves of the hand as well as sensation. His interphalangeal joints are becoming stiff. He is 3 cm shy of pulp to palm hand flexion.   Brisk capillary refill    DATA:    CBC:   Recent Labs     07/18/20  1405 07/19/20  0459   WBC 15.9* 14.5*   HGB 16.3 15.4    137     BMP:    Recent Labs     07/18/20  1405 07/19/20  0459    137   K 4.7 4.4    103   CO2 27 25   BUN 34* 34*   CREATININE 1.7* 2.0*   GLUCOSE 133* 113*     INR:   Recent Labs     07/18/20  1528 07/19/20  0459   INR 3.52* 3.25*       Radiology:   XR ELBOW LEFT (2 VIEWS)   Final Result      1. No acute osseous findings. 2.  Diffuse swelling. XR RADIUS ULNA LEFT (2 VIEWS)   Final Result      1. No acute osseous findings. 2.  Diffuse swelling. XR HAND LEFT (2 VIEWS)   Final Result      1. No acute osseous findings. 2.  Diffuse swelling. IMPRESSION/RECOMMENDATIONS:    Assessment: LEFT dorsal hand cellulitis    Plan:  1) I unroofed the small lesion to expose well appearing bed of granulation tissue. Examination does not appear to demonstrate any focal or loculated area of purulence. Exam and radiographs demonstrate no subcutaneous crepitation or gas to be consistent with necrotizing fasciitis. That said, I do think this should be treated aggressively. I agree with aggressive IV antibiotics. I would recommend more strict elevation above the level of the heart. Gentle finger range of movement to prevent stiffness. Control of his INR which was quite elevated again today. I informed [place] that I think we need to see convincing evidence of clinical improvement before he is discharged. Appropriate antibiotic coverage. At this time he looks unlikely to require surgical intervention. We will continue to follow along for resolution. Please call with any questions or concerns      Thank you for the opportunity to consult on this patient.     Jovan Centeno

## 2020-07-19 NOTE — PLAN OF CARE
Problem: Pain:  Goal: Patient's pain/discomfort is manageable  7/18/2020 2358 by Oscar Luo RN  Outcome: Ongoing  Note: Pt denies pain at this time, VSS, no objective signs of pain. PRN tylenol given for mild pain. Pt reports relief. Will continue to monitor. Problem: Falls - Risk of:  Goal: Will remain free from falls  7/18/2020 2358 by Oscar Luo RN  Outcome: Ongoing  Note: Patient is a high fall risk. All fall precautions in place: bed alarm on, nonskid socks on pt, call light within reach, bed locked in lowest position. Will continue to monitor pt safety.

## 2020-07-19 NOTE — PROGRESS NOTES
Clinical Pharmacy Consult Note    Admit date: 7/18/2020    Subjective/Objective:  80 yom with PMHx significant for BPH, pacemaker, HTN, hypothyroidism, gout, Afib on warfarin, CKD3, and ILD presented with c/o cellulitis infection on his L arm. Patient reportedly saw dermatologist last Wednesday where he had two excisional biopsies done on potential squamous cell carcinoma lesions on his R lateral knee and R forehead. Interval update:  SCr up a bit from 1.7-->2.0. Pharmacy is consulted to dose Vancomycin and Warfarin per Dr. Natalie Holman    Current antibiotics:  Vancomycin - Pharmacy to dose - day #2   1g IV q24h (7/18-current)    Home anticoagulation regimen:  Warfarin 5 mg daily (per patient interview)    Date INR Warfarin Dose   7/18 3.52 5 mg (taken at home)   7/19 3.25                  Recent Labs     07/18/20  1405 07/19/20  0459    137   K 4.7 4.4    103   CO2 27 25   BUN 34* 34*   CREATININE 1.7* 2.0*       Estimated Creatinine Clearance: 28 mL/min (A) (based on SCr of 2 mg/dL (H)). Recent Labs     07/18/20  1405 07/19/20  0459   WBC 15.9* 14.5*   HGB 16.3 15.4   HCT 49.6 46.9   MCV 84.9 84.9    137       Cultures:  None available    Assessment/Plan:  1)  Cellulitis:  Vancomycin - day #2  · Vancomycin - Pharmacy to dose  · Will continue 1g IV q24h. · SCr is up a bit from baseline (1.7-->2.0 today) - if increases further tomorrow, will likely change to intermittent dosing. Daily dose is not due until 15:00, so will evaluate renal function in the AM before dose is due to determine if change is needed. · Clinical condition will be monitored closely, and levels / doses will be ordered as appropriate.     2)  H/o A.fib, on Warfarin:  Pharmacy to dose, target INR = 2.0-3.0  · INR today = 3.25, down from 3.54 yesterday despite taking Warfarin at home yesterday before arrival to hospital.  · Will give Warfarin 2.5mg po x1 today (lower than usual home dose due to slightly elevated INR).  · If INR stable tomorrow, can likely resume home dose of 5mg daily tomorrow. · Daily INR will be monitored closely and dose adjustments will be made as appropriate.     Please call with questions--  Thanks--  Fabio Patricia, PharmD, [address], [address] [facility]   7/19/2020 10:24 AM

## 2020-07-19 NOTE — PROGRESS NOTES
Pt is alert and oriented times 4. VSS. Left arm remains wed and swollen denies numbness and tingling. +2 radial pulse. Pt denies pain at this time. All fall precautions in place. Will continue to monitor.

## 2020-07-20 LAB
ANION GAP SERPL CALCULATED.3IONS-SCNC: 9 MMOL/L (ref 3–16)
BUN BLDV-MCNC: 33 MG/DL (ref 7–20)
CALCIUM SERPL-MCNC: 9.6 MG/DL (ref 8.3–10.6)
CHLORIDE BLD-SCNC: 103 MMOL/L (ref 99–110)
CO2: 24 MMOL/L (ref 21–32)
CREAT SERPL-MCNC: 1.8 MG/DL (ref 0.8–1.3)
GFR AFRICAN AMERICAN: 43
GFR NON-AFRICAN AMERICAN: 36
GLUCOSE BLD-MCNC: 112 MG/DL (ref 70–99)
HCT VFR BLD CALC: 47.2 % (ref 40.5–52.5)
HEMOGLOBIN: 15.5 G/DL (ref 13.5–17.5)
INR BLD: 2.32 (ref 0.86–1.14)
MCH RBC QN AUTO: 27.8 PG (ref 26–34)
MCHC RBC AUTO-ENTMCNC: 32.8 G/DL (ref 31–36)
MCV RBC AUTO: 84.6 FL (ref 80–100)
PDW BLD-RTO: 15 % (ref 12.4–15.4)
PLATELET # BLD: 154 K/UL (ref 135–450)
PMV BLD AUTO: 7.6 FL (ref 5–10.5)
POTASSIUM REFLEX MAGNESIUM: 4.4 MMOL/L (ref 3.5–5.1)
PROTHROMBIN TIME: 27.1 SEC (ref 10–13.2)
RBC # BLD: 5.58 M/UL (ref 4.2–5.9)
SODIUM BLD-SCNC: 136 MMOL/L (ref 136–145)
WBC # BLD: 12.4 K/UL (ref 4–11)

## 2020-07-20 PROCEDURE — 36415 COLL VENOUS BLD VENIPUNCTURE: CPT

## 2020-07-20 PROCEDURE — 97116 GAIT TRAINING THERAPY: CPT

## 2020-07-20 PROCEDURE — 97161 PT EVAL LOW COMPLEX 20 MIN: CPT

## 2020-07-20 PROCEDURE — 97165 OT EVAL LOW COMPLEX 30 MIN: CPT

## 2020-07-20 PROCEDURE — 6370000000 HC RX 637 (ALT 250 FOR IP): Performed by: INTERNAL MEDICINE

## 2020-07-20 PROCEDURE — 2500000003 HC RX 250 WO HCPCS: Performed by: INTERNAL MEDICINE

## 2020-07-20 PROCEDURE — 85027 COMPLETE CBC AUTOMATED: CPT

## 2020-07-20 PROCEDURE — 97530 THERAPEUTIC ACTIVITIES: CPT

## 2020-07-20 PROCEDURE — 6360000002 HC RX W HCPCS: Performed by: INTERNAL MEDICINE

## 2020-07-20 PROCEDURE — 85610 PROTHROMBIN TIME: CPT

## 2020-07-20 PROCEDURE — 1200000000 HC SEMI PRIVATE

## 2020-07-20 PROCEDURE — 99222 1ST HOSP IP/OBS MODERATE 55: CPT | Performed by: INTERNAL MEDICINE

## 2020-07-20 PROCEDURE — 80048 BASIC METABOLIC PNL TOTAL CA: CPT

## 2020-07-20 PROCEDURE — 2580000003 HC RX 258

## 2020-07-20 PROCEDURE — 2580000003 HC RX 258: Performed by: INTERNAL MEDICINE

## 2020-07-20 RX ORDER — SODIUM CHLORIDE 9 MG/ML
INJECTION, SOLUTION INTRAVENOUS
Status: COMPLETED
Start: 2020-07-20 | End: 2020-07-20

## 2020-07-20 RX ORDER — WARFARIN SODIUM 5 MG/1
5 TABLET ORAL DAILY
Status: DISCONTINUED | OUTPATIENT
Start: 2020-07-20 | End: 2020-07-21 | Stop reason: HOSPADM

## 2020-07-20 RX ORDER — CLINDAMYCIN HYDROCHLORIDE 300 MG/1
300 CAPSULE ORAL 3 TIMES DAILY
Qty: 30 CAPSULE | Refills: 0 | Status: SHIPPED | OUTPATIENT
Start: 2020-07-20 | End: 2020-07-30

## 2020-07-20 RX ORDER — CLINDAMYCIN PHOSPHATE 600 MG/50ML
600 INJECTION INTRAVENOUS EVERY 8 HOURS
Status: DISCONTINUED | OUTPATIENT
Start: 2020-07-20 | End: 2020-07-21 | Stop reason: HOSPADM

## 2020-07-20 RX ADMIN — FINASTERIDE 5 MG: 5 TABLET, FILM COATED ORAL at 08:21

## 2020-07-20 RX ADMIN — Medication 10 ML: at 20:34

## 2020-07-20 RX ADMIN — LORAZEPAM 0.5 MG: 0.5 TABLET ORAL at 20:32

## 2020-07-20 RX ADMIN — LEVOTHYROXINE SODIUM 125 MCG: 0.12 TABLET ORAL at 08:22

## 2020-07-20 RX ADMIN — CYANOCOBALAMIN TAB 1000 MCG 1000 MCG: 1000 TAB at 08:21

## 2020-07-20 RX ADMIN — POLYETHYLENE GLYCOL 3350 17 G: 17 POWDER, FOR SOLUTION ORAL at 08:22

## 2020-07-20 RX ADMIN — WARFARIN SODIUM 5 MG: 5 TABLET ORAL at 17:36

## 2020-07-20 RX ADMIN — CLINDAMYCIN IN 5 PERCENT DEXTROSE 600 MG: 12 INJECTION, SOLUTION INTRAVENOUS at 17:03

## 2020-07-20 RX ADMIN — Medication 3 MG: at 20:32

## 2020-07-20 RX ADMIN — SODIUM CHLORIDE 100 ML: 9 INJECTION, SOLUTION INTRAVENOUS at 17:01

## 2020-07-20 RX ADMIN — VANCOMYCIN HYDROCHLORIDE 1000 MG: 10 INJECTION, POWDER, LYOPHILIZED, FOR SOLUTION INTRAVENOUS at 15:50

## 2020-07-20 RX ADMIN — Medication 10 ML: at 08:23

## 2020-07-20 RX ADMIN — CHOLECALCIFEROL (VITAMIN D3) 10 MCG (400 UNIT) TABLET 400 UNITS: at 08:21

## 2020-07-20 NOTE — PROGRESS NOTES
Pt is alert and oriented times 4. VSS. Left arm remains red/swollen and tender to the touch. +2 radial pulse. Pt denies numbness/ tingling. Pt having difficulty sleeping,PRN ativan given per order with no help, one time dose of Ambien given, pt resting comfortably at this time. Will continue to monitor.

## 2020-07-20 NOTE — CONSULTS
STATUS:      OCCUPATION:   Retired     Family History:   No immunodeficiency    REVIEW OF SYSTEMS:    No fever / chills / sweats. No weight loss. No visual change, eye pain, eye discharge. No oral lesion, sore throat, dysphagia. Denies cough / sputum. Denies chest pain, palpitations. Denies n / v / abd pain. No diarrhea. Denies dysuria or change in urinary function. Denies joint swelling or pain. No myalgia, arthralgia. Denies skin changes, itching  Denies focal weakness, sensory change or other neurologic symptom    Denies new / worse depression, psychiatric symptoms  Denies any Endocrine or Hematologic symptoms    PHYSICAL EXAM:      Vitals:    /83   Pulse 61   Temp 97.9 °F (36.6 °C) (Oral)   Resp 16   Ht 6' (1.829 m)   Wt 195 lb 1.7 oz (88.5 kg)   SpO2 98%   BMI 26.46 kg/m²     GENERAL: No apparent distress.     HEENT: Membranes moist, no oral lesion, PERRL  NECK:  Supple, no lymphadenopaty  LUNGS: Clear b/l, no rales, no dullness  CARDIAC: RRR, no murmur appreciated  ABD:  + BS, soft / NT  EXT:  No rash, no edema, no lesions - R dorsum hand red, warm, scab over distal MC, no open wound, mild swelling, able to move all fingers   NEURO: No focal neurologic findings  PSYCH: Orientation, sensorium, mood normal  LINES:  Peripheral iv    DATA:    Lab Results   Component Value Date    WBC 12.4 (H) 07/20/2020    HGB 15.5 07/20/2020    HCT 47.2 07/20/2020    MCV 84.6 07/20/2020     07/20/2020     Lab Results   Component Value Date    CREATININE 1.8 (H) 07/20/2020    BUN 33 (H) 07/20/2020     07/20/2020    K 4.4 07/20/2020     07/20/2020    CO2 24 07/20/2020       Hepatic Function Panel:   Lab Results   Component Value Date    ALKPHOS 47 03/03/2015    ALT 24 03/03/2015    AST 25 03/03/2015    PROT 7.1 03/03/2015    PROT 7.0 01/23/2013    BILITOT 0.8 03/03/2015    LABALBU 4.3 04/30/2015       Micro:  7/19 Wound cult : BS 1+WBC, 1+GPC, cult light S aureus, PBP neg  7/19 BC no growth to date    Imagin/18 R hand x-ray:   1.  No acute osseous findings. 2.  Diffuse swelling.     IMPRESSION:      Patient Active Problem List   Diagnosis    Hypertension    Hypothyroidism    Gout    Pacemaker    Hyperlipidemia    BPH (benign prostatic hyperplasia)    Atrial fibrillation ([address]    Allergy to sunlight    Chronic renal disease, stage III ([address]    IFG (impaired fasting glucose)    ILD (interstitial lung disease) (HCC)    Cellulitis of hand       hx AF (on coumadin), HTN, hypothyroid, PM, CKD    R hand cellulitis    Allergy to PCN, cephalosporins, sulfa    RECOMMENDATIONS:    Change to clindamycin  Will check cult - await S aureus sensitivities    At discharge, home on clindamycin (if sensitive)  F/u Derm    Discussed with pt, RN, Hospitalist - Dr Angelica Cortez MD

## 2020-07-20 NOTE — PLAN OF CARE
Orthopedic follow-up    Patient seen in consultation yesterday morning. Dorsal hand cellulitis. On IV vancomycin. Cultures demonstrate gram-positive cocci in clusters. Afebrile. Recommend continued elevation, gentle finger range movement to prevent stiffness. Appropriate antibiotic therapy. Discharge plan once confident clinical exam improving. Please call with questions or concerns for if clinical evaluation worsens.

## 2020-07-20 NOTE — PROGRESS NOTES
Physical Therapy    Facility/Department: [facility] 5T ORTHO/NEURO  Initial Assessment/discharge note      NAME: Wu Adams  : 1931  MRN: [id number]    Date of Service: 2020    Discharge Recommendations:Juan Vigil scored a 24/24 on the AM-PAC short mobility form. Current research shows that an AM-PAC score of 18 or greater is typically associated with a discharge to the patient's home setting. PT Equipment Recommendations  Equipment Needed: No    Assessment   Assessment: 81 yo admitted 20 for L hand cellulitis. Pt demo mobility at or close to his reported baseline of independent at home without DME. Pt plans to return home at discharge. No acute PT or DME needs. if home, recommend increased supervision PRN initially. Will sign off for acute PT. Decision Making: Low Complexity  Patient Education: Pt educated on PT role, importance of OOB mobility and he verbalized understanding. REQUIRES PT FOLLOW UP: No  Activity Tolerance  Activity Tolerance: Patient Tolerated treatment well       Patient Diagnosis(es): The encounter diagnosis was Cellulitis of left hand. has a past medical history of Allergy to sunlight, Atrial fibrillation ([address] BPH (benign prostatic hyperplasia), Gout, HTN (hypertension), Hyperlipidemia, Hypothyroidism, and Pacemaker. has a past surgical history that includes Spine surgery (); Skin cancer excision; Tibia fracture surgery; and Skin cancer excision (). Restrictions  Position Activity Restriction  Other position/activity restrictions: up as tolerated     Vision/Hearing  Vision: Impaired  Vision Exceptions: Wears glasses at all times  Hearing: Exceptions to [facility]  Hearing Exceptions: Hard of hearing/hearing concerns       Subjective  General  Chart Reviewed: Yes  Additional Pertinent Hx: 81 yo admitted 20 for L hand cellulitis. Ortho consult-no surgery. Pmhx: afib, pacemaker,  L hand lesion excisions.   Diagnosis: L hand cellulitis  Follows Commands: Within Functional Limits  Subjective  Subjective: Pt found supine in bed and agreeable to PT. \" I don't need any therapy. \"  Pain Screening  Patient Currently in Pain: Yes(pt c/o pain in R hamstrings-did not rate; RN aware (pt reports preexissting))         Orientation  Orientation  Overall Orientation Status: Within Functional Limits     Social/Functional History  Social/Functional History  Lives With: Alone  Type of Home: House  Home Layout: Multi-level, Able to Live on Main level with bedroom/bathroom  Home Access: Stairs to enter with rails(2 to enter with rail)  Bathroom Shower/Tub: Tub/Shower unit  Bathroom Toilet: Standard  Bathroom Equipment: Shower chair, Grab bars in shower  Home Equipment: Wheelchair-manual, Rolling walker, Cane(not using DME PTA)  ADL Assistance: Independent  Homemaking Assistance: Independent  Ambulation Assistance: Independent  Transfer Assistance: Independent  Active : Yes  Occupation: Retired  Type of occupation: retired orthodontist  Additional Comments: Pt reports recent fall helping wife (who passed away last month) and he has a L LE hamstring injury. Objective          AROM RLE (degrees)  RLE AROM: WFL  AROM LLE (degrees)  LLE AROM : WFL     Strength RLE  Strength RLE: WNL  Strength LLE  Strength LLE: WNL        Bed mobility  Rolling to Right: Independent  Supine to Sit: Independent  Sit to Supine: Independent  Scooting: Independent     Transfers  Sit to Stand: Independent(x2 trials)  Stand to sit:  Independent(x2 trials)     Ambulation  Device: No Device  Assistance: Independent  Quality of Gait: steady gait with slight limp on R LE; no overt LOB noted  Distance: 400 ft  Stairs/Curb  Stairs?: (up/down 4 steps with rail mod I)     Balance  Sitting - Static: Good  Sitting - Dynamic: Good  Standing - Static: Good  Standing - Dynamic: Good        Plan   Safety Devices  Type of devices: Call light within reach, Chair alarm in place, Left in chair, Nurse notified             AM-PAC Score  AM-PAC Inpatient Mobility Raw Score : 24 (07/20/20 0954)  AM-PAC Inpatient T-Scale Score : 61.14 (07/20/20 0954)  Mobility Inpatient CMS 0-100% Score: 0 (07/20/20 0954)  Mobility Inpatient CMS G-Code Modifier : [facility] (07/20/20 8811)          Goals          Therapy Time   Individual Concurrent Group Co-treatment   Time In 0800         Time Out 0825         Minutes 25           Timed Code Treatment Minutes: 15      Total Treatment Minutes:  25       Elias Curtis, PT

## 2020-07-20 NOTE — PLAN OF CARE
Problem: Infection:  Goal: Will remain free from infection  Description: Will remain free from infection  Outcome: Ongoing     Problem: Safety:  Goal: Free from accidental physical injury  Description: Free from accidental physical injury  Outcome: Ongoing     Problem: Daily Care:  Goal: Daily care needs are met  Description: Daily care needs are met  Outcome: Ongoing     Problem: Pain:  Goal: Patient's pain/discomfort is manageable  Description: Patient's pain/discomfort is manageable  Outcome: Met This Shift     Problem: Discharge Planning:  Goal: Patients continuum of care needs are met  Description: Patients continuum of care needs are met  Outcome: Ongoing

## 2020-07-20 NOTE — PROGRESS NOTES
Patient A&Ox4, VSS. Neuro checks within normal limits, pt denies numbness or tingling. LUE swelling and redness improved per pt, pain minimal. Patient ambulating with standby assist/contact guard, tolerates fairly well, appears unsteady at times. Patient would like to be discharged home, awaiting ID recommendations. Will continue to monitor.

## 2020-07-20 NOTE — PROGRESS NOTES
Progress Note  Admit Date: 7/18/2020       Patient seen and examined  Doing ok erythema improved, pain improved, movement of hand has improved. No nausea or vomiting tolerating po. No fevers or chills. Scheduled Medications:    warfarin  5 mg Oral Daily    vancomycin  1,000 mg Intravenous Q24H    vitamin D3  400 Units Oral Daily    finasteride  5 mg Oral Daily    Coenzyme Q10  300 mg Oral Daily    levothyroxine  125 mcg Oral Daily    vitamin B-12  1,000 mcg Oral Daily    sodium chloride flush  10 mL Intravenous 2 times per day      PRN Medications: calcium carbonate, LORazepam, melatonin, Suvorexant, pantoprazole, sodium chloride flush, acetaminophen **OR** acetaminophen, polyethylene glycol, promethazine **OR** ondansetron, diphenhydrAMINE-zinc acetate  Diet: DIET GENERAL;    Continuous Infusions:    PHYSICAL EXAM:  /83   Pulse 61   Temp 97.9 °F (36.6 °C) (Oral)   Resp 16   Ht 6' (1.829 m)   Wt 195 lb 1.7 oz (88.5 kg)   SpO2 98%   BMI 26.46 kg/m²   No results for input(s): POCGLU in the last 72 hours.     Intake/Output Summary (Last 24 hours) at 7/20/2020 1534  Last data filed at 7/20/2020 0403  Gross per 24 hour   Intake 600 ml   Output --   Net 600 ml       /83   Pulse 61   Temp 97.9 °F (36.6 °C) (Oral)   Resp 16   Ht 6' (1.829 m)   Wt 195 lb 1.7 oz (88.5 kg)   SpO2 98%   BMI 26.46 kg/m²   General appearance: alert, appears stated age and cooperative  Head: Normocephalic, without obvious abnormality, atraumatic  Neck: no adenopathy, no carotid bruit, no JVD, supple, symmetrical, trachea midline and thyroid not enlarged, symmetric, no tenderness/mass/nodules  Lungs: clear to auscultation bilaterally  Heart: regular rate and rhythm, S1, S2 normal, no murmur, click, rub or gallop  Abdomen: soft, non-tender; bowel sounds normal; no masses,  no organomegaly  Extremities: extremities normal, atraumatic, no cyanosis or edema  Pulses: 2+ and symmetric  Skin: Skin color, texture, turgor normal. No rashes or lesions the erythema from cellulitis persists however its not as bad as it was based on what patient is describing. LABS:  Recent Labs     07/18/20  1405 07/19/20  0459 07/20/20  0436   WBC 15.9* 14.5* 12.4*   HGB 16.3 15.4 15.5   HCT 49.6 46.9 47.2    137 154                                                                    Recent Labs     07/18/20  1405 07/19/20  0459 07/20/20  0436    137 136   K 4.7 4.4 4.4    103 103   CO2 27 25 24   BUN 34* 34* 33*   CREATININE 1.7* 2.0* 1.8*   GLUCOSE 133* 113* 112*     No results for input(s): AST, ALT, ALB, BILITOT, ALKPHOS in the last 72 hours. No results for input(s): TROPONINI in the last 72 hours. No results for input(s): BNP in the last 72 hours. No results for input(s): CHOL, HDL in the last 72 hours. Invalid input(s): LDLCALCU  Recent Labs     07/18/20  1528 07/19/20  0459 07/20/20  0436   INR 3.52* 3.25* 2.32*       Assessment & Plan:    Patient Active Problem List:     Hypertension     Hypothyroidism     Gout     Pacemaker     Hyperlipidemia     BPH (benign prostatic hyperplasia)     Atrial fibrillation (HCC)     Allergy to sunlight     Chronic renal disease, stage III (HCC)     IFG (impaired fasting glucose)     ILD (interstitial lung disease) (HCC)     Cellulitis of hand      81 yo admitted with cellulitis and abscess likely staph maybe combo await final cultures. Clindamycin a good option given allergies and ckd stage 2, I have ordered it and sent it to the pharmacy. afib currently seems like he is sinus, rate controlled, continue with warfarin. The patient and / or the family were informed of the results of any tests, a time was given to answer questions, a plan was proposed and they agreed with plan.   Disposition: continue inpatient stay   Full MD Can Russell

## 2020-07-20 NOTE — PROGRESS NOTES
Occupational Therapy   Occupational Therapy Initial Assessment/Tx/Discharge Note  Date: 2020   Patient Name: Toya Miranda  MRN: [id number]     : 1931    Date of Service: 2020  Assessment: Pt is at or very near his functional baseline. He appears mildly unsteady during ambulation which is likely due to a recent hamstring injury that remains stiff and sore. Pt reports improvement in hand, verbalizes understanding of elevation and ROM, and is having no difficulty with ADLs. No acute OT needs identified. Recommend home with assist as needed. Discharge Recommendations: Toya Miranda scored a 21/24 on the AM-PAC ADL Inpatient form. Current research shows that an AM-PAC score of 18 or greater is typically associated with a discharge to the patient's home setting. Based on the patient's AM-PAC score, and their current ADL deficits, it is recommended that the patient have 2-3 sessions per week of Occupational Therapy at d/c to increase the patient's independence. At this time, this patient demonstrates the endurance and safety to discharge home with assist as needed. Please see assessment section for further patient specific details. OT Equipment Recommendations  Equipment Needed: No    Assessment   Decision Making: Low Complexity  No Skilled OT: No OT goals identified; Safe to return home  REQUIRES OT FOLLOW UP: No  Activity Tolerance  Activity Tolerance: Patient Tolerated treatment well  Safety Devices  Safety Devices in place: Yes  Type of devices: Call light within reach; Chair alarm in place;Nurse notified; Left in chair                  Restrictions  Position Activity Restriction  Other position/activity restrictions: up as tolerated; per ortho note - elevation and finger ROM L hand    Subjective   General  Chart Reviewed: Yes  Additional Pertinent Hx: 80 y.o. M admitted  with cellulitis of L hand after recent dermatological procedure.  PMHx includes pacemaker, Afib, gout, HTN, tib/fib fx, lumbar surgery  Family / Caregiver Present: No  Referring Practitioner: Hollis Seay  Diagnosis: cellulitis of hand  Subjective  Subjective: Pt in bed on entry. Reports being very independent and active and has no therapy related concerns, but cooperative with assessment. Patient Currently in Pain: Yes(pt c/o pain in R hamstrings-did not rate; RN aware (pt reports preexissting))  Pain Assessment  Pain Assessment: 0-10(L hand pain improving; chronic hamstring pain)    Social/Functional History  Social/Functional History  Lives With: Alone  Type of Home: House  Home Layout: Multi-level, Able to Live on Main level with bedroom/bathroom  Home Access: Stairs to enter with rails(2 to enter with rail)  Bathroom Shower/Tub: Tub/Shower unit  Bathroom Toilet: Standard  Bathroom Equipment: Shower chair, Grab bars in shower  Home Equipment: Wheelchair-manual, Rolling walker, Cane(not using DME PTA)  ADL Assistance: Independent  Homemaking Assistance: Independent  Ambulation Assistance: Independent  Transfer Assistance: Independent  Active : Yes  Occupation: Retired  Type of occupation: retired orthodontist  Additional Comments: Pt reports recent fall helping wife (who passed away last month) and he has a L LE hamstring injury. Objective        Orientation  Overall Orientation Status: Within Functional Limits     Balance  Sitting Balance: Independent  Standing Balance: Independent(static stance)  Standing Balance  Time: 5 min  Activity: functional mobility in room, avila  Comment: independent stance, spvn transfers, spvn to SBA gait - mildly unsteady without overt LOB. Injured hamstring a couple months ago, still having trouble with stiffness/soreness. ADL  Feeding: Independent  Grooming: Independent  LE Dressing: Supervision     Transfers  Sit to stand: Independent  Stand to sit:  Independent     Cognition  Overall Cognitive Status: WFL     Hand Assessment Comment  Hand Assessment Comment: Pt reports improvement

## 2020-07-20 NOTE — CARE COORDINATION
Case Management Assessment           Initial Evaluation                Date / Time of Evaluation: 7/20/2020 4:39 PM                 Assessment Completed by: Kirk Morgan     Spoke with patient at bedside regarding discharge needs. Pt is from home and is independent with all ADL's. If he needed IV ABX the cost of Vanc is $23 per week but I just spoke with Dr. Marcela Brock and pt will d/c to home with PO ABX. Patient Name: Kyra [place]     YOB: 1931  Diagnosis: Cellulitis of hand [L03.119]  Cellulitis of hand [Y14.328]     Date / Time: 7/18/2020  1:07 PM    Patient Admission Status: Inpatient    If patient is discharged prior to next notation, then this note serves as note for discharge by case management. Current PCP: Zakia Mi MD  Clinic Patient: No    Chart Reviewed: Yes  Patient/ Family Interviewed: Yes    Initial assessment completed at bedside with: patient    Hospitalization in the last 30 days: No    Emergency Contacts:  Extended Emergency Contact Information  Primary Emergency Contact: Cassie Vigil  Address: [address] [place] [address] Phone: [phone]  Relation: Spouse  Secondary Emergency Contact: [address] [place] [address], [address] [place] [address] Phone: [phone]  Relation: Child  Preferred language: English   needed?  No    Advance Directives:   Code Status: Full Code    Healthcare Power of : Yes  Agent: Kei Washington  Contact Number: NA    Copy present: Yes     In paper Chart: No    Scanned into EMR Yes    Financial  Payor: Samantha Mayberry / Plan: MEDICARE PART A AND B / Product Type: *No Product type* /     Pre-cert required for SNF: No    Pharmacy    [address]  Phone: [phone] Fax: [phone]      Potential assistance Purchasing Medications: Potential Assistance Purchasing Medications: No  Does Patient want to participate in local refill/ meds to beds program?: No    Meds To Beds General Rules:  1. Can ONLY be done Monday- Friday between 8:30am-5pm  2. Prescription(s) must be in pharmacy by 3pm to be filled same day  3. Copy of patient's insurance/ prescription drug card and patient face sheet must be sent along with the prescription(s)  4. Cost of Rx cannot be added to hospital bill. If financial assistance is needed, please contact unit  or ;  or  CANNOT provide pharmacy voucher for patients co-pays  5. Patients can then  the prescription on their way out of the hospital at discharge, or pharmacy can deliver to the bedside if staff is available. (payment due at time of pick-up or delivery - cash, check, or card accepted)     Able to afford home medications/ co-pay costs: Yes    ADLS  Support Systems: Spouse/Significant Other, Children, Family Members    PT AM-PAC: 24 /24  OT AM-PAC: 21 /24    [place]: Multi-level house  Steps: 2    Plans to RETURN to current housing: Yes  Barriers to RETURNING to current housing: none noted      DISCHARGE PLAN:  Disposition: Home- No Services Needed    Transportation PLAN for discharge: self     Factors facilitating achievement of predicted outcomes: Family support, Cooperative and Pleasant    Barriers to discharge: needs another dose of ABX    Additional Case Management Notes: NA    The Plan for Transition of Care is related to the following treatment goals of Cellulitis of hand [T25.399]  Cellulitis of hand [M43.593]    The Patient and/or patient representative [place] Baker and his family were provided with a choice of provider and agrees with the discharge plan Yes    Freedom of choice list was provided with basic dialogue that supports the patient's individualized plan of care/goals and shares the quality data associated with the providers.  Yes    Care Transition patient:

## 2020-07-20 NOTE — PROGRESS NOTES
Clinical Pharmacy Consult Note    Admit date: 7/18/2020    Subjective/Objective:  80 yom with PMHx significant for BPH, pacemaker, HTN, hypothyroidism, gout, Afib on warfarin, CKD3, and ILD presented with c/o cellulitis infection on his L arm/hand. Patient reportedly saw dermatologist last Wednesday where he had two excisional biopsies done on potential squamous cell carcinoma lesions on his R lateral knee and R forehead. Interval update:  INR within goal range. Afebrile. Pharmacy is consulted to dose Vancomycin and Warfarin per Dr. Daphnie Mac    Current antibiotics:  Vancomycin - Pharmacy to dose - day #3   1g IV q24h (7/18-current)    Home anticoagulation regimen:  Warfarin 5 mg daily (per patient interview)    Date INR Warfarin Dose   7/18 3.52 5 mg (taken at home)   7/19 3.25 2.5mg   7/20 2.32             Recent Labs     07/19/20  0459 07/20/20  0436    136   K 4.4 4.4    103   CO2 25 24   BUN 34* 33*   CREATININE 2.0* 1.8*       Estimated Creatinine Clearance: 31 mL/min (A) (based on SCr of 1.8 mg/dL (H)). Recent Labs     07/19/20  0459 07/20/20  0436   WBC 14.5* 12.4*   HGB 15.4 15.5   HCT 46.9 47.2   MCV 84.9 84.6    154       Cultures:  7/19 Blood x2 = in process  7/19 Wound (hand) = 1+ GPC      Assessment/Plan:  1)  Cellulitis - dorsal hand:  Vancomycin - day #3. · Vancomycin - Pharmacy to dose  · Will continue 1g IV q24h. · Scr today = 1.8, stable from yesterday's 2 and baseline ~1.7. No UOP documented, but nursing note states he is voiding adequately. Will continue same dose for now. · Plan to obtain a trough level once at steady state, due 7/21 @ 14:30. Goal range for cellulitis is 10-15 mcg/mL. · Clinical condition will be monitored closely, and levels / doses will be ordered as appropriate. 2)  H/o A.fib, on Warfarin:  Pharmacy to dose, target INR = 2.0-3.0  · INR today = 2.32, down from 3.25 yesterday after a 2.5mg dose (smaller than home dose).   Within goal range for Afib. · Will resume home regimen of Warfarin 5mg daily, beginning tonight. · Reviewed current medications; no signifiant drug-drug interactions with warfarin at this time. · Daily INR will be monitored closely and dose adjustments will be made as appropriate.     Please call with questions--  Thanks--  Paola Rodriguez PharmD., BCPS   7/20/2020 8:48 AM  Wireless: [phone]

## 2020-07-21 VITALS
BODY MASS INDEX: 26.43 KG/M2 | DIASTOLIC BLOOD PRESSURE: 99 MMHG | WEIGHT: 195.11 LBS | TEMPERATURE: 97.5 F | OXYGEN SATURATION: 96 % | HEIGHT: 72 IN | RESPIRATION RATE: 16 BRPM | SYSTOLIC BLOOD PRESSURE: 131 MMHG | HEART RATE: 61 BPM

## 2020-07-21 LAB
ANION GAP SERPL CALCULATED.3IONS-SCNC: 9 MMOL/L (ref 3–16)
BUN BLDV-MCNC: 35 MG/DL (ref 7–20)
CALCIUM SERPL-MCNC: 9.8 MG/DL (ref 8.3–10.6)
CHLORIDE BLD-SCNC: 103 MMOL/L (ref 99–110)
CO2: 28 MMOL/L (ref 21–32)
CREAT SERPL-MCNC: 1.9 MG/DL (ref 0.8–1.3)
GFR AFRICAN AMERICAN: 41
GFR NON-AFRICAN AMERICAN: 34
GLUCOSE BLD-MCNC: 108 MG/DL (ref 70–99)
GRAM STAIN RESULT: ABNORMAL
HCT VFR BLD CALC: 49.9 % (ref 40.5–52.5)
HEMOGLOBIN: 16.4 G/DL (ref 13.5–17.5)
INR BLD: 2.2 (ref 0.86–1.14)
MCH RBC QN AUTO: 27.9 PG (ref 26–34)
MCHC RBC AUTO-ENTMCNC: 32.8 G/DL (ref 31–36)
MCV RBC AUTO: 85 FL (ref 80–100)
ORGANISM: ABNORMAL
PDW BLD-RTO: 15.1 % (ref 12.4–15.4)
PLATELET # BLD: 169 K/UL (ref 135–450)
PMV BLD AUTO: 7.6 FL (ref 5–10.5)
POTASSIUM REFLEX MAGNESIUM: 5.2 MMOL/L (ref 3.5–5.1)
PROTHROMBIN TIME: 25.7 SEC (ref 10–13.2)
RBC # BLD: 5.87 M/UL (ref 4.2–5.9)
SODIUM BLD-SCNC: 140 MMOL/L (ref 136–145)
WBC # BLD: 9.6 K/UL (ref 4–11)
WOUND/ABSCESS: ABNORMAL

## 2020-07-21 PROCEDURE — 2580000003 HC RX 258: Performed by: INTERNAL MEDICINE

## 2020-07-21 PROCEDURE — 2500000003 HC RX 250 WO HCPCS: Performed by: INTERNAL MEDICINE

## 2020-07-21 PROCEDURE — 85610 PROTHROMBIN TIME: CPT

## 2020-07-21 PROCEDURE — 85027 COMPLETE CBC AUTOMATED: CPT

## 2020-07-21 PROCEDURE — 6370000000 HC RX 637 (ALT 250 FOR IP): Performed by: INTERNAL MEDICINE

## 2020-07-21 PROCEDURE — 80048 BASIC METABOLIC PNL TOTAL CA: CPT

## 2020-07-21 PROCEDURE — 36415 COLL VENOUS BLD VENIPUNCTURE: CPT

## 2020-07-21 RX ADMIN — Medication 10 ML: at 08:36

## 2020-07-21 RX ADMIN — FINASTERIDE 5 MG: 5 TABLET, FILM COATED ORAL at 08:36

## 2020-07-21 RX ADMIN — CHOLECALCIFEROL (VITAMIN D3) 10 MCG (400 UNIT) TABLET 400 UNITS: at 08:36

## 2020-07-21 RX ADMIN — LEVOTHYROXINE SODIUM 125 MCG: 0.12 TABLET ORAL at 07:14

## 2020-07-21 RX ADMIN — CLINDAMYCIN IN 5 PERCENT DEXTROSE 600 MG: 12 INJECTION, SOLUTION INTRAVENOUS at 08:36

## 2020-07-21 RX ADMIN — CYANOCOBALAMIN TAB 1000 MCG 1000 MCG: 1000 TAB at 08:36

## 2020-07-21 RX ADMIN — CLINDAMYCIN IN 5 PERCENT DEXTROSE 600 MG: 12 INJECTION, SOLUTION INTRAVENOUS at 01:14

## 2020-07-21 NOTE — PLAN OF CARE
Problem: Safety:  Goal: Free from accidental physical injury  Description: Free from accidental physical injury  Outcome: Met This Shift   Pt free from injury this shift and free of falls. 2/4 rails up on bed and bed is in the lowest position. Wheels locked and bed alarm set. Socks on pt and ID bands on pt. Call light in reach of pt and pt educated to call out to get up sba. Will continue to monitor for safety. Problem: Infection:  Goal: Will remain free from infection  Description: Will remain free from infection  Outcome: Ongoing   Afebrile. ABX per orders. Redness and swelling persist but are improving per pt.

## 2020-07-21 NOTE — CARE COORDINATION
Case Management Assessment            Discharge Note                    Date / Time of Note: 7/21/2020 10:41 AM                  Discharge Note Completed by: Courtney Uriostegui     Discharge order noted and pt has no needs at d/c. Patient Name: Apryl Yun   YOB: 1931  Diagnosis: Cellulitis of hand [L03.119]  Cellulitis of hand [L03.119]   Date / Time: 7/18/2020  1:07 PM    Current PCP: Darren León MD  Clinic patient: No    Hospitalization in the last 30 days: No    Advance Directives:  Code Status: Full Code  [place] DNR form completed and on chart: No    Financial:  Payor: Redd Manuel / Plan: MEDICARE PART A AND B / Product Type: *No Product type* /      Pharmacy:    [address] [address]  Phone: [phone] Fax: [phone]      Assistance purchasing medications?: Potential Assistance Purchasing Medications: No  Assistance provided by Case Management: None at this time    Does patient want to participate in local refill/ meds to beds program?: No    Meds To Beds General Rules:  1. Can ONLY be done Monday- Friday between 8:30am-5pm  2. Prescription(s) must be in pharmacy by 3pm to be filled same day  3. Copy of patient's insurance/ prescription drug card and patient face sheet must be sent along with the prescription(s)  4. Cost of Rx cannot be added to hospital bill. If financial assistance is needed, please contact unit  or ;  or  CANNOT provide pharmacy voucher for patients co-pays  5.  Patients can then  the prescription on their way out of the hospital at discharge, or pharmacy can deliver to the bedside if staff is available. (payment due at time of pick-up or delivery - cash, check, or card accepted)     Able to afford home medications/ co-pay costs: Yes    ADLS:  Current PT AM-PAC Score: 24 /24  Current OT

## 2020-07-21 NOTE — PROGRESS NOTES
Pt was discharged home. All teaching provided. All questions answered. All belongings and supplies with patient.

## 2020-07-21 NOTE — DISCHARGE SUMMARY
[facility] HOSPITALISTS DISCHARGE SUMMARY    Patient Demographics    Patient. Precious Sharp  Date of Birth. 12/2/1931  MRN. [id number]     Primary care provider. Nasrin Velasco MD  (Tel: [phone])    Admit date: 7/18/2020    Discharge date (blank if same as Note Date): Note Date: 7/21/2020     Reason for Hospitalization. Chief Complaint   Patient presents with    Hand Pain     redness, swelling to left had after dermatology procedure on Wednesday         Significant Findings. Active Problems:    Cellulitis of hand  Resolved Problems:    * No resolved hospital problems. *       Problems and results from this hospitalization that need follow up. 1. Follow-up cellulitis and plan    Significant test results and incidental findings. 1.   XR ELBOW LEFT (2 VIEWS)   Final Result      1. No acute osseous findings. 2.  Diffuse swelling. XR RADIUS ULNA LEFT (2 VIEWS)   Final Result      1. No acute osseous findings. 2.  Diffuse swelling. XR HAND LEFT (2 VIEWS)   Final Result      1. No acute osseous findings. 2.  Diffuse swelling. Invasive procedures and treatments. [address] Course. Patient was admitted for left arm pain and swelling. Probably precipitated by recent cryotherapy. Cultures did grew MSSA. Patient did responded to IV antibiotics and will be discharged on clindamycin. ID input was obtained during the stay. pAfib   On Coumadin, INR therapeutic     History of hypertension  History of hypothyroidism  History of CKD stage III    On day of discharge slight hyperkalemia will need no follow-up BMP in 8 weeks also since he is on antibiotic recommend checking INR within 5 to 7 days. Consults. IP CONSULT TO HOSPITALIST  IP CONSULT TO ORTHOPEDIC SURGERY  IP CONSULT TO PHARMACY  IP CONSULT TO INFECTIOUS DISEASES    Physical examination on discharge day.    BP (!) 131/99   Pulse 61   Temp 97.5 °F (36.4 °C) (Oral)   Resp 16   Ht 6' (1.829 m)   Wt 195 lb 1.7 oz (88.5 kg)   SpO2 96%   BMI 26.46 kg/m²   General appearance. Alert. Looks comfortable. HEENT. Sclera clear. Moist mucus membranes. Cardiovascular. Regular rate and rhythm, normal S1, S2. No murmur. Respiratory. Not using accessory muscles. Clear to auscultation bilaterally, no wheeze. Gastrointestinal. Abdomen soft, non-tender, not distended, normal bowel sounds  Neurology. Facial symmetry. No speech deficits. Moving all extremities equally. Extremities. No edema in lower extremities. Skin. Warm, dry, normal turgor  Left hand no significant redness or tenderness or warmth noticed      Condition at time of discharge stable  Medication instructions provided to patient at discharge. Medication List      START taking these medications    clindamycin 300 MG capsule  Commonly known as:  CLEOCIN  Take 1 capsule by mouth 3 times daily for 10 days        CHANGE how you take these medications    eszopiclone 2 MG Tabs  Commonly known as:  LUNESTA  Take 1 tablet by mouth nightly  What changed:  how much to take     furosemide 40 MG tablet  Commonly known as:  LASIX  TAKE 1 TABLET BY MOUTH TWICE DAILY  What changed:  See the new instructions. levothyroxine 100 MCG tablet  Commonly known as:  SYNTHROID  Take 1 tablet by mouth daily.   What changed:  how much to take        CONTINUE taking these medications    acetaminophen 500 MG tablet  Commonly known as:  TYLENOL     Belsomra 10 MG Tabs  Generic drug:  Suvorexant     blood glucose test strips strip  Commonly known as:  FREESTYLE LITE  Test once daily     calcium carbonate 500 MG chewable tablet  Commonly known as:  TUMS     calcium-vitamin D 500-200 MG-UNIT per tablet  Commonly known as:  OSCAL-500     Cholecalciferol 400 UNIT Tabs tablet     EQL CoQ10 300 MG Caps  Generic drug:  Coenzyme Q10     Febuxostat 80 MG Tabs     finasteride 5 MG tablet  Commonly known as:  PROSCAR     FISH OIL     lansoprazole 15 MG delayed release capsule  Commonly known as:  PREVACID     melatonin 3 MG Tabs tablet     mometasone 50 MCG/ACT nasal spray  Commonly known as:  Nasonex  2 sprays by Nasal route daily. MULTIVITAMIN PO     simvastatin 10 MG tablet  Commonly known as:  ZOCOR     temazepam 15 MG capsule  Commonly known as:  RESTORIL  TAKE ONE CAPSULE BY MOUTH EVERY NIGHT AT BEDTIME AS NEEDED FOR SLEEP     testosterone enanthate 200 MG/ML injection  Commonly known as:  DELATESTRYL     vitamin B-12 1000 MCG tablet  Commonly known as:  CYANOCOBALAMIN     warfarin 5 MG tablet  Commonly known as:  COUMADIN        STOP taking these medications    amLODIPine 5 MG tablet  Commonly known as:  NORVASC     carvedilol 6.25 MG tablet  Commonly known as:  Coreg     traZODone 50 MG tablet  Commonly known as:  DESYREL           Where to Get Your Medications      These medications were sent to [address] [place] [address]    Phone:  [phone]   · clindamycin 300 MG capsule         Discharge recommendations given to patient. Follow Up. PCP  Disposition. home  Activity. activity as tolerated  Diet: DIET GENERAL;      Spent 35 minutes in discharge process.     Signed:  Angelica Scales MD     7/21/2020 10:15 AM

## 2020-07-21 NOTE — PROGRESS NOTES
Clinical Pharmacy Consult Note    Admit date: 7/18/2020    Subjective/Objective:  80 yom with PMHx significant for BPH, pacemaker, HTN, hypothyroidism, gout, Afib on warfarin, CKD3, and ILD presented with c/o cellulitis infection on his L arm/hand. Patient reportedly saw dermatologist last Wednesday where he had two excisional biopsies done on potential squamous cell carcinoma lesions on his R lateral knee and R forehead. Interval update:  ID changed abx to clindamycin. Pharmacy is consulted to dose Warfarin per Dr. Garland Lerma    Current antibiotics:      1g IV q24h (7/18-7/20)  Clindamycin 600mg IV q8h -- day #2 (7/20-current)      Home anticoagulation regimen:  Warfarin 5 mg daily (per patient interview)    Date INR Warfarin Dose   7/18 3.52 5 mg (taken at home)   7/19 3.25 2.5mg   7/20 2.32 5mg   7/21 2.2        Recent Labs     07/20/20  0436 07/21/20  0537    140   K 4.4 5.2*    103   CO2 24 28   BUN 33* 35*   CREATININE 1.8* 1.9*       Estimated Creatinine Clearance: 29 mL/min (A) (based on SCr of 1.9 mg/dL (H)). Recent Labs     07/20/20  0436 07/21/20  0537   WBC 12.4* 9.6   HGB 15.5 16.4   HCT 47.2 49.9   MCV 84.6 85.0    169       Cultures:  7/19 Blood x2 = NGTD  7/19 Wound (hand) = MSSA. Sensitive to Clinda, Erythromycin, TCN, Bactrim      Assessment/Plan:  1)  Cellulitis - dorsal hand:  Vancomycin stopped on day #3, changed to IV clindamycin. · ID plans to change to oral clindamycin for discharge home today. 2)  H/o A.fib, on Warfarin:  Pharmacy to dose, target INR = 2.0-3.0  · INR today = 2.2, within goal range for Afib. · Continue home regimen of Warfarin 5mg daily. · Reviewed current medications; no signifiant drug-drug interactions with warfarin at this time. · Daily INR will be monitored closely and dose adjustments will be made as appropriate.     Please call with questions--  Thanks--  Dayron Alec PharmD., BCPS   7/21/2020 6:54 AM  Wireless: [phone]

## 2020-07-21 NOTE — PROGRESS NOTES
VSS with exception to mildly elevated BP's overnight. LUE redness swelling noted to have improvement since the abx per pt. Pt is up x1 with GB and tolerates this well. Pt voids WNL. Pt awaits ID recommendation. Plan is to be converted to PO abx today and anticipates d/c. Pt has been a/ox4. Lungs clear. Bed alarm set. Call light in reach. Will continue to monitor.

## 2020-07-23 LAB
BLOOD CULTURE, ROUTINE: NORMAL
CULTURE, BLOOD 2: NORMAL

## 2020-07-24 NOTE — PROGRESS NOTES
Physician Progress Note      PATIENT:               Jorge Loya  CSN #:                  [id number]  :                       1931  ADMIT DATE:       2020 1:07 PM  Rom[facility] Petit [place] DATE:        2020 11:06 AM  RESPONDING  PROVIDER #:        Karol Mitchell MD          QUERY TEXT:    Patient admitted with Cellulitis, noted to have atrial fibrillation. If   possible, please document in progress notes and discharge summary further   specificity regarding the type of atrial fibrillation: The medical record reflects the following:  Risk Factors: 81 y/o PMHx of afib and Heart block  Clinical Indicators: H&P: \"Atrial fibrillation: Heart rate controlled\"  Treatment: Coumadin    Chronic: nonspecific term that could be referring to paroxysmal, persistent,   or permanent  Longstanding persistent: persistent and continuous, lasting > 1 year. Paroxysmal - self-terminating or intermittent; resolves with or without   intervention within 7 days of onset; may recur with various frequency. Persistent - Fails to terminate within 7 days; Often requires meds or   cardioversion to restore to NSR. Permanent - longstanding & persistent; Medication has been ineffective in   restoring NSR &/or cardioversion is contraindicated    Definitions per MS-DRG Training Guide and Quick Reference Guide, [facility] 5   Diseases and Disorders of the Circulatory System; 2019; [employer]. Software content   from the [employer]? Advanced CDI Transformation Program  Options provided:  -- Chronic Atrial Fibrillation, unspecified  -- Paroxysmal Atrial Fibrillation  -- Other - I will add my own diagnosis  -- Disagree - Clinically unable to determine / Unknown  -- Refer to Clinical Documentation Reviewer    PROVIDER RESPONSE TEXT:    This patient has paroxysmal atrial fibrillation.     Query created by: Avila Darling on 2020 11:04 AM      Electronically signed by:  Karol Mitchell MD 2020 2:54 PM

## 2021-02-27 ENCOUNTER — HOSPITAL ENCOUNTER (INPATIENT)
Age: 86
LOS: 1 days | Discharge: HOME | DRG: 392 | End: 2021-02-28
Attending: STUDENT IN AN ORGANIZED HEALTH CARE EDUCATION/TRAINING PROGRAM | Admitting: INTERNAL MEDICINE
Payer: MEDICARE

## 2021-02-27 ENCOUNTER — APPOINTMENT (OUTPATIENT)
Dept: CT IMAGING | Age: 86
DRG: 392 | End: 2021-02-27
Payer: MEDICARE

## 2021-02-27 DIAGNOSIS — R10.84 GENERALIZED ABDOMINAL PAIN: Primary | ICD-10-CM

## 2021-02-27 DIAGNOSIS — N17.9 AKI (ACUTE KIDNEY INJURY) (HCC): ICD-10-CM

## 2021-02-27 PROBLEM — R10.9 ABDOMINAL PAIN: Status: ACTIVE | Noted: 2021-02-27

## 2021-02-27 LAB
A/G RATIO: 1.3 (ref 1.1–2.2)
ABO/RH: NORMAL
ALBUMIN SERPL-MCNC: 4.4 G/DL (ref 3.4–5)
ALP BLD-CCNC: 48 U/L (ref 40–129)
ALT SERPL-CCNC: 18 U/L (ref 10–40)
ANION GAP SERPL CALCULATED.3IONS-SCNC: 10 MMOL/L (ref 3–16)
ANTIBODY SCREEN: NORMAL
APTT: 45.5 SEC (ref 24.2–36.2)
AST SERPL-CCNC: 31 U/L (ref 15–37)
BASOPHILS ABSOLUTE: 0.1 K/UL (ref 0–0.2)
BASOPHILS RELATIVE PERCENT: 0.8 %
BILIRUB SERPL-MCNC: 0.3 MG/DL (ref 0–1)
BILIRUBIN URINE: NEGATIVE
BLOOD, URINE: NEGATIVE
BUN BLDV-MCNC: 37 MG/DL (ref 7–20)
CALCIUM SERPL-MCNC: 10.5 MG/DL (ref 8.3–10.6)
CHLORIDE BLD-SCNC: 99 MMOL/L (ref 99–110)
CLARITY: CLEAR
CO2: 28 MMOL/L (ref 21–32)
COLOR: YELLOW
CREAT SERPL-MCNC: 2.5 MG/DL (ref 0.8–1.3)
EOSINOPHILS ABSOLUTE: 0.4 K/UL (ref 0–0.6)
EOSINOPHILS RELATIVE PERCENT: 4.3 %
EPITHELIAL CELLS, UA: NORMAL /HPF (ref 0–5)
GFR AFRICAN AMERICAN: 30
GFR NON-AFRICAN AMERICAN: 24
GLOBULIN: 3.4 G/DL
GLUCOSE BLD-MCNC: 125 MG/DL (ref 70–99)
GLUCOSE URINE: NEGATIVE MG/DL
HCT VFR BLD CALC: 45.6 % (ref 40.5–52.5)
HEMOGLOBIN: 14.9 G/DL (ref 13.5–17.5)
INR BLD: 2.53 (ref 0.86–1.14)
KETONES, URINE: NEGATIVE MG/DL
LACTIC ACID: 1 MMOL/L (ref 0.4–2)
LEUKOCYTE ESTERASE, URINE: ABNORMAL
LIPASE: 30 U/L (ref 13–60)
LYMPHOCYTES ABSOLUTE: 1.4 K/UL (ref 1–5.1)
LYMPHOCYTES RELATIVE PERCENT: 14.8 %
MCH RBC QN AUTO: 28.6 PG (ref 26–34)
MCHC RBC AUTO-ENTMCNC: 32.7 G/DL (ref 31–36)
MCV RBC AUTO: 87.6 FL (ref 80–100)
MICROSCOPIC EXAMINATION: YES
MONOCYTES ABSOLUTE: 0.7 K/UL (ref 0–1.3)
MONOCYTES RELATIVE PERCENT: 7.3 %
NEUTROPHILS ABSOLUTE: 7.1 K/UL (ref 1.7–7.7)
NEUTROPHILS RELATIVE PERCENT: 72.8 %
NITRITE, URINE: NEGATIVE
PDW BLD-RTO: 15.1 % (ref 12.4–15.4)
PH UA: 6 (ref 5–8)
PLATELET # BLD: 189 K/UL (ref 135–450)
PLATELET SLIDE REVIEW: NORMAL
PMV BLD AUTO: 7.6 FL (ref 5–10.5)
POTASSIUM REFLEX MAGNESIUM: 4.4 MMOL/L (ref 3.5–5.1)
PROTEIN UA: ABNORMAL MG/DL
PROTHROMBIN TIME: 29.6 SEC (ref 10–13.2)
RBC # BLD: 5.21 M/UL (ref 4.2–5.9)
RBC UA: NORMAL /HPF (ref 0–4)
SLIDE REVIEW: NORMAL
SODIUM BLD-SCNC: 137 MMOL/L (ref 136–145)
SODIUM URINE: 87 MMOL/L
SPECIFIC GRAVITY UA: 1.01 (ref 1–1.03)
TOTAL PROTEIN: 7.8 G/DL (ref 6.4–8.2)
TROPONIN: 0.01 NG/ML
URINE TYPE: ABNORMAL
UROBILINOGEN, URINE: 0.2 E.U./DL
WBC # BLD: 9.7 K/UL (ref 4–11)
WBC UA: NORMAL /HPF (ref 0–5)

## 2021-02-27 PROCEDURE — 82570 ASSAY OF URINE CREATININE: CPT

## 2021-02-27 PROCEDURE — 6370000000 HC RX 637 (ALT 250 FOR IP): Performed by: INTERNAL MEDICINE

## 2021-02-27 PROCEDURE — 84156 ASSAY OF PROTEIN URINE: CPT

## 2021-02-27 PROCEDURE — 96374 THER/PROPH/DIAG INJ IV PUSH: CPT

## 2021-02-27 PROCEDURE — 96375 TX/PRO/DX INJ NEW DRUG ADDON: CPT

## 2021-02-27 PROCEDURE — 80053 COMPREHEN METABOLIC PANEL: CPT

## 2021-02-27 PROCEDURE — 86901 BLOOD TYPING SEROLOGIC RH(D): CPT

## 2021-02-27 PROCEDURE — 86850 RBC ANTIBODY SCREEN: CPT

## 2021-02-27 PROCEDURE — 83605 ASSAY OF LACTIC ACID: CPT

## 2021-02-27 PROCEDURE — 81001 URINALYSIS AUTO W/SCOPE: CPT

## 2021-02-27 PROCEDURE — G0378 HOSPITAL OBSERVATION PER HR: HCPCS

## 2021-02-27 PROCEDURE — 84484 ASSAY OF TROPONIN QUANT: CPT

## 2021-02-27 PROCEDURE — 85025 COMPLETE CBC W/AUTO DIFF WBC: CPT

## 2021-02-27 PROCEDURE — 86900 BLOOD TYPING SEROLOGIC ABO: CPT

## 2021-02-27 PROCEDURE — 2580000003 HC RX 258: Performed by: INTERNAL MEDICINE

## 2021-02-27 PROCEDURE — 99283 EMERGENCY DEPT VISIT LOW MDM: CPT

## 2021-02-27 PROCEDURE — 83690 ASSAY OF LIPASE: CPT

## 2021-02-27 PROCEDURE — 84300 ASSAY OF URINE SODIUM: CPT

## 2021-02-27 PROCEDURE — 85730 THROMBOPLASTIN TIME PARTIAL: CPT

## 2021-02-27 PROCEDURE — 85610 PROTHROMBIN TIME: CPT

## 2021-02-27 PROCEDURE — 74176 CT ABD & PELVIS W/O CONTRAST: CPT

## 2021-02-27 PROCEDURE — 1200000000 HC SEMI PRIVATE

## 2021-02-27 PROCEDURE — 6360000002 HC RX W HCPCS: Performed by: STUDENT IN AN ORGANIZED HEALTH CARE EDUCATION/TRAINING PROGRAM

## 2021-02-27 PROCEDURE — 93005 ELECTROCARDIOGRAM TRACING: CPT | Performed by: STUDENT IN AN ORGANIZED HEALTH CARE EDUCATION/TRAINING PROGRAM

## 2021-02-27 RX ORDER — OYSTER SHELL CALCIUM WITH VITAMIN D 500; 200 MG/1; [IU]/1
1 TABLET, FILM COATED ORAL DAILY
Status: DISCONTINUED | OUTPATIENT
Start: 2021-02-27 | End: 2021-02-28 | Stop reason: HOSPADM

## 2021-02-27 RX ORDER — PANTOPRAZOLE SODIUM 40 MG/1
40 TABLET, DELAYED RELEASE ORAL
Status: DISCONTINUED | OUTPATIENT
Start: 2021-02-28 | End: 2021-02-28 | Stop reason: HOSPADM

## 2021-02-27 RX ORDER — ATORVASTATIN CALCIUM 10 MG/1
10 TABLET, FILM COATED ORAL NIGHTLY
Status: DISCONTINUED | OUTPATIENT
Start: 2021-02-27 | End: 2021-02-28 | Stop reason: HOSPADM

## 2021-02-27 RX ORDER — HYDRALAZINE HYDROCHLORIDE 20 MG/ML
10 INJECTION INTRAMUSCULAR; INTRAVENOUS EVERY 6 HOURS PRN
Status: DISCONTINUED | OUTPATIENT
Start: 2021-02-27 | End: 2021-02-28 | Stop reason: HOSPADM

## 2021-02-27 RX ORDER — DEXAMETHASONE SODIUM PHOSPHATE 4 MG/ML
10 INJECTION, SOLUTION INTRA-ARTICULAR; INTRALESIONAL; INTRAMUSCULAR; INTRAVENOUS; SOFT TISSUE ONCE
Status: COMPLETED | OUTPATIENT
Start: 2021-02-27 | End: 2021-02-27

## 2021-02-27 RX ORDER — ACETAMINOPHEN 650 MG/1
650 SUPPOSITORY RECTAL EVERY 6 HOURS PRN
Status: DISCONTINUED | OUTPATIENT
Start: 2021-02-27 | End: 2021-02-28 | Stop reason: HOSPADM

## 2021-02-27 RX ORDER — LORAZEPAM 0.5 MG/1
0.5 TABLET ORAL NIGHTLY PRN
Status: DISCONTINUED | OUTPATIENT
Start: 2021-02-27 | End: 2021-02-28 | Stop reason: HOSPADM

## 2021-02-27 RX ORDER — CALCIUM CARBONATE 200(500)MG
1 TABLET,CHEWABLE ORAL 3 TIMES DAILY PRN
Status: DISCONTINUED | OUTPATIENT
Start: 2021-02-27 | End: 2021-02-28 | Stop reason: HOSPADM

## 2021-02-27 RX ORDER — LANOLIN ALCOHOL/MO/W.PET/CERES
3 CREAM (GRAM) TOPICAL NIGHTLY PRN
Status: DISCONTINUED | OUTPATIENT
Start: 2021-02-27 | End: 2021-02-28 | Stop reason: HOSPADM

## 2021-02-27 RX ORDER — PROMETHAZINE HYDROCHLORIDE 25 MG/1
12.5 TABLET ORAL EVERY 6 HOURS PRN
Status: DISCONTINUED | OUTPATIENT
Start: 2021-02-27 | End: 2021-02-28 | Stop reason: HOSPADM

## 2021-02-27 RX ORDER — AMLODIPINE BESYLATE 5 MG/1
5 TABLET ORAL DAILY
COMMUNITY
Start: 2021-01-18 | End: 2022-01-11 | Stop reason: ALTCHOICE

## 2021-02-27 RX ORDER — LANOLIN ALCOHOL/MO/W.PET/CERES
1000 CREAM (GRAM) TOPICAL DAILY
Status: DISCONTINUED | OUTPATIENT
Start: 2021-02-27 | End: 2021-02-28 | Stop reason: HOSPADM

## 2021-02-27 RX ORDER — LORAZEPAM 0.5 MG/1
0.5 TABLET ORAL NIGHTLY
Status: DISCONTINUED | OUTPATIENT
Start: 2021-02-27 | End: 2021-02-27

## 2021-02-27 RX ORDER — ACETAMINOPHEN 325 MG/1
650 TABLET ORAL EVERY 6 HOURS PRN
Status: DISCONTINUED | OUTPATIENT
Start: 2021-02-27 | End: 2021-02-28 | Stop reason: HOSPADM

## 2021-02-27 RX ORDER — SODIUM CHLORIDE 0.9 % (FLUSH) 0.9 %
10 SYRINGE (ML) INJECTION PRN
Status: DISCONTINUED | OUTPATIENT
Start: 2021-02-27 | End: 2021-02-28 | Stop reason: HOSPADM

## 2021-02-27 RX ORDER — POLYETHYLENE GLYCOL 3350 17 G/17G
17 POWDER, FOR SOLUTION ORAL DAILY PRN
Status: DISCONTINUED | OUTPATIENT
Start: 2021-02-27 | End: 2021-02-28 | Stop reason: HOSPADM

## 2021-02-27 RX ORDER — FLUTICASONE PROPIONATE 50 MCG
2 SPRAY, SUSPENSION (ML) NASAL DAILY
Status: DISCONTINUED | OUTPATIENT
Start: 2021-02-27 | End: 2021-02-28 | Stop reason: HOSPADM

## 2021-02-27 RX ORDER — LEVOTHYROXINE SODIUM 0.12 MG/1
125 TABLET ORAL DAILY
Status: DISCONTINUED | OUTPATIENT
Start: 2021-02-27 | End: 2021-02-28 | Stop reason: HOSPADM

## 2021-02-27 RX ORDER — FINASTERIDE 5 MG/1
5 TABLET, FILM COATED ORAL DAILY
Status: DISCONTINUED | OUTPATIENT
Start: 2021-02-27 | End: 2021-02-28 | Stop reason: HOSPADM

## 2021-02-27 RX ORDER — OMEGA-3S/DHA/EPA/FISH OIL/D3 300MG-1000
400 CAPSULE ORAL DAILY
Status: DISCONTINUED | OUTPATIENT
Start: 2021-02-27 | End: 2021-02-28 | Stop reason: HOSPADM

## 2021-02-27 RX ORDER — WARFARIN SODIUM 5 MG/1
5 TABLET ORAL DAILY
Status: DISCONTINUED | OUTPATIENT
Start: 2021-02-28 | End: 2021-02-28 | Stop reason: HOSPADM

## 2021-02-27 RX ORDER — SODIUM CHLORIDE 0.9 % (FLUSH) 0.9 %
10 SYRINGE (ML) INJECTION EVERY 12 HOURS SCHEDULED
Status: DISCONTINUED | OUTPATIENT
Start: 2021-02-27 | End: 2021-02-28 | Stop reason: HOSPADM

## 2021-02-27 RX ORDER — OXYCODONE HYDROCHLORIDE 5 MG/1
5 TABLET ORAL EVERY 4 HOURS PRN
Status: DISCONTINUED | OUTPATIENT
Start: 2021-02-27 | End: 2021-02-28 | Stop reason: HOSPADM

## 2021-02-27 RX ORDER — DIPHENHYDRAMINE HYDROCHLORIDE 50 MG/ML
25 INJECTION INTRAMUSCULAR; INTRAVENOUS ONCE
Status: COMPLETED | OUTPATIENT
Start: 2021-02-27 | End: 2021-02-27

## 2021-02-27 RX ORDER — MORPHINE SULFATE 4 MG/ML
4 INJECTION, SOLUTION INTRAMUSCULAR; INTRAVENOUS ONCE
Status: COMPLETED | OUTPATIENT
Start: 2021-02-27 | End: 2021-02-27

## 2021-02-27 RX ORDER — ONDANSETRON 2 MG/ML
4 INJECTION INTRAMUSCULAR; INTRAVENOUS EVERY 6 HOURS PRN
Status: DISCONTINUED | OUTPATIENT
Start: 2021-02-27 | End: 2021-02-28 | Stop reason: HOSPADM

## 2021-02-27 RX ADMIN — DEXAMETHASONE SODIUM PHOSPHATE 10 MG: 4 INJECTION, SOLUTION INTRAMUSCULAR; INTRAVENOUS at 15:34

## 2021-02-27 RX ADMIN — MORPHINE SULFATE 4 MG: 4 INJECTION INTRAVENOUS at 16:53

## 2021-02-27 RX ADMIN — DIPHENHYDRAMINE HYDROCHLORIDE 25 MG: 50 INJECTION, SOLUTION INTRAMUSCULAR; INTRAVENOUS at 15:34

## 2021-02-27 RX ADMIN — ATORVASTATIN CALCIUM 10 MG: 10 TABLET, FILM COATED ORAL at 21:45

## 2021-02-27 RX ADMIN — Medication 3 MG: at 21:45

## 2021-02-27 RX ADMIN — Medication 10 ML: at 21:45

## 2021-02-27 ASSESSMENT — ENCOUNTER SYMPTOMS
ABDOMINAL DISTENTION: 1
COUGH: 0
SHORTNESS OF BREATH: 0
ABDOMINAL PAIN: 1
DIARRHEA: 0
EYE REDNESS: 0
VOMITING: 0
BLOOD IN STOOL: 0
EYE PAIN: 0
SORE THROAT: 0
ANAL BLEEDING: 0
BACK PAIN: 0
NAUSEA: 1

## 2021-02-27 ASSESSMENT — PAIN DESCRIPTION - PAIN TYPE
TYPE: ACUTE PAIN
TYPE: ACUTE PAIN

## 2021-02-27 ASSESSMENT — PAIN SCALES - GENERAL
PAINLEVEL_OUTOF10: 0
PAINLEVEL_OUTOF10: 6
PAINLEVEL_OUTOF10: 8

## 2021-02-27 ASSESSMENT — PAIN DESCRIPTION - LOCATION: LOCATION: ABDOMEN

## 2021-02-27 ASSESSMENT — PAIN DESCRIPTION - FREQUENCY: FREQUENCY: INTERMITTENT

## 2021-02-27 NOTE — H&P
Hospital Medicine History & Physical      PCP: Juan Carlos Rodríguez MD    Date of Admission: 2/27/2021    Date of Service: Pt seen/examined on 02/27/21 and Admitted to Inpatient with expected LOS greater than two midnights due to medical therapy. Chief Complaint: Abdominal pain    History Of Present Illness:      80 y.o. male with past medical history of heart block status post pacemaker placement, atrial fibrillation, hypertension, hyperlipidemia, BPH, gout presented with abdominal pain. Patient states that abdominal pain started this morning and was generalized pain. He has been eating and drinking okay. Had 2 small BMs but not as his regular bowel movements. States that he has not been passing flatus. Denies any nausea or vomiting. Denies any fever or chills. Denies chest pain or shortness of breath. States that he has not had any diarrhea. Takes MiraLAX daily and usually has regular bowel movement but did not work today. Pain was constant with no exacerbating or relieving factor. Patient denies any falls. Patient presented to the ED. In the ED, patient was vitally stable. Lab work showed normal CBC, LFTs and troponin. Lipase was normal.  BMP showed creatinine at 2.5 with blood glucose 125. INR was 2.53. UA was negative. CT abdomen pelvis showed nonobstructive bowel gas pattern, multiple bilateral nonobstructing renal calculi, prostate hypertrophy and multilevel degenerative spondylosis. Patient was given IV morphine for abdominal pain. Patient pain improved. Patient is being admitted for further management. 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      Atrial fibrillation ([address] 2006    on event monitor    BPH (benign prostatic hyperplasia)     Gout     HTN (hypertension)     Hyperlipidemia     Hypothyroidism     Pacemaker 2006       Past Surgical History:          Procedure Laterality Date    SKIN CANCER EXCISION  SKIN CANCER EXCISION  2012    melanoma in [address] SURGERY  2007    L4-5    TIBIA FRACTURE SURGERY         Medications Prior to Admission:      Prior to Admission medications    Medication Sig Start Date End Date Taking? Authorizing Provider   calcium-vitamin D (OSCAL-500) 500-200 MG-UNIT per tablet Take 1 tablet by mouth daily    Historical Provider, MD   Febuxostat 80 MG TABS Take 80 mg by mouth daily as needed    Historical Provider, MD   melatonin 3 MG TABS tablet Take 3 mg by mouth nightly as needed    Historical Provider, MD   acetaminophen (TYLENOL) 500 MG tablet Take 500 mg by mouth nightly as needed for Pain    Historical Provider, MD   vitamin B-12 (CYANOCOBALAMIN) 1000 MCG tablet Take 1,000 mcg by mouth daily    Historical Provider, MD   Cholecalciferol 400 UNIT TABS tablet Take 400 Units by mouth daily    Historical Provider, MD   lansoprazole (PREVACID) 15 MG delayed release capsule Take 15 mg by mouth daily as needed    Historical Provider, MD   Coenzyme Q10 (EQL COQ10) 300 MG CAPS Take 300 mg by mouth daily    Historical Provider, MD   calcium carbonate (TUMS) 500 MG chewable tablet Take 1 tablet by mouth 3 times daily as needed for Heartburn    Historical Provider, MD   Suvorexant (BELSOMRA) 10 MG TABS Take 10 mg by mouth nightly as needed. Historical Provider, MD   eszopiclone (LUNESTA) 2 MG TABS Take 1 tablet by mouth nightly  Patient taking differently: Take 2-4 mg by mouth nightly. 4/30/15   Abril Hand MD   temazepam (RESTORIL) 15 MG capsule TAKE ONE CAPSULE BY MOUTH EVERY NIGHT AT BEDTIME AS NEEDED FOR SLEEP 4/1/15   Abril Hand MD   testosterone enanthate (DELATESTRYL) 200 MG/ML injection USE AS DIRECTED, INJECTING EVERY 2 WEEKS 3/2/15   Historical Provider, MD   mometasone (NASONEX) 50 MCG/ACT nasal spray 2 sprays by Nasal route daily.  4/1/15   Mariah Perrin MD   furosemide (LASIX) 40 MG tablet TAKE 1 TABLET BY MOUTH TWICE DAILY  Patient taking differently: 40 mg 2 times daily as needed  1/19/15   Masoud Nichols MD   levothyroxine (SYNTHROID) 100 MCG tablet Take 1 tablet by mouth daily. Patient taking differently: Take 125 mcg by mouth daily  3/8/14   Masoud Nichols MD   warfarin (COUMADIN) 5 MG tablet 5 mg every day 2/12/14   Masoud Nichols MD   glucose blood VI test strips (FREESTYLE LITE) strip Test once daily 12/14/12   Masoud Nichols MD   simvastatin (ZOCOR) 10 MG tablet Take 1 tablet by mouth nightly. 9/13/12   Masoud Nichols MD   Multiple Vitamin (MULTIVITAMIN PO) Take 1 tablet by mouth     Historical Provider, MD   FISH OIL Take 500 mg by mouth daily     Historical Provider, MD   finasteride (PROSCAR) 5 MG tablet Take 5 mg by mouth daily  2/7/11   Historical Provider, MD       Allergies:  Allopurinol, Penicillins, Ciprofloxacin, Iodides, Sulfa antibiotics, Sulfamethoxazole-trimethoprim, and Cephalosporins    Social History:      The patient currently lives at home    TOBACCO:   reports that he quit smoking about 58 years ago. His smoking use included cigarettes. He has a 10.00 pack-year smoking history. He has never used smokeless tobacco.  ETOH:   reports current alcohol use. Family History:       Positive as follows:        Problem Relation Age of Onset    Other Sister         Rheumatic fever       REVIEW OF SYSTEMS:   Pertinent positives as noted in the HPI. All other systems reviewed and negative. PHYSICAL EXAM PERFORMED:    BP (!) 168/90   Pulse 60   Temp 98 °F (36.7 °C) (Oral)   Resp 17   SpO2 98%     General appearance:  No apparent distress, appears stated age and cooperative. HEENT:  Normal cephalic, atraumatic without obvious deformity. Pupils equal, round, and reactive to light. Extra ocular muscles intact. Conjunctivae/corneas clear. Neck: Supple, with full range of motion. No jugular venous distention. Trachea midline. Respiratory:  Normal respiratory effort. Clear to auscultation, bilaterally without Rales/Wheezes/Rhonchi.   Cardiovascular: Regular rate and rhythm with normal S1/S2 without murmurs, rubs or gallops. Abdomen: Soft, generalized abdominal mild tenderness and mild distention noted, bowel sounds positive  Musculoskeletal:  No clubbing, cyanosis or edema bilaterally. Full range of motion without deformity. Skin: Skin color, texture, turgor normal.  No rashes or lesions. Neurologic:  Grossly non-focal.  Psychiatric:  Alert and oriented  Capillary Refill: Brisk,< 3 seconds   Peripheral Pulses: +2 palpable, equal bilaterally       Labs:     Recent Labs     02/27/21  1515   WBC 9.7   HGB 14.9   HCT 45.6        Recent Labs     02/27/21  1516      K 4.4   CL 99   CO2 28   BUN 37*   CREATININE 2.5*   CALCIUM 10.5     Recent Labs     02/27/21  1516   AST 31   ALT 18   BILITOT 0.3   ALKPHOS 48     Recent Labs     02/27/21  1515   INR 2.53*     Recent Labs     02/27/21  1516   TROPONINI 0.01       Urinalysis:      Lab Results   Component Value Date    NITRU Negative 02/27/2021    WBCUA 0-2 02/27/2021    BACTERIA RARE 10/15/2020    RBCUA None seen 02/27/2021    BLOODU Negative 02/27/2021    SPECGRAV 1.010 02/27/2021    GLUCOSEU Negative 02/27/2021       Radiology:       CT ABDOMEN PELVIS WO CONTRAST   Final Result      1. Nonobstructive bowel gas pattern. 2.  Multiple bilateral nonobstructing renal calculi. 3.  Prostatic hypertrophy. 4.  Multilevel degenerative spondylosis with degenerative endplate changes and mild superior T12 endplate compression, age indeterminate.           ASSESSMENT:    Active Hospital Problems    Diagnosis Date Noted    Abdominal pain [R10.9] 02/27/2021         PLAN:  Abdominal pain  Unclear etiology, secondary to possible renal stone or constipation  Patient states that he has not passed flatus, even though mention has had 2 small bowel movements this morning  CT abdomen pelvis with nonobstructive bowel gas pattern and bilateral nonobstructive renal stones  Status post IV morphine in the ED with improvement in abdominal pain  Pain medication as needed ordered  Renal ultrasound ordered  Full liquid diet, advance as tolerated  General surgery consulted  Nephrology consulted    Atrial fibrillation:  Heart rate controlled   INR supratherapeutic, monitor INR  Hold warfarin, pharmacy consulted     Hypertension:  Monitor blood pressure  Takes Lasix 40 mg daily at home  Holding Lasix     Hypothyroidism:  Continue Synthroid     ERIC on CKD stage III:  Baseline creatinine appears to be close to 2.1  Creatinine 2.5 on admission creatinine at baseline  Holding Lasix  Nephrology consulted    BPH:  Continue finasteride     GERD:  Continue Tums and PPI PRN     Insomnia:  Takes Tylenol PM at night at home  Continue melatonin and ativan prn     DVT Prophylaxis: Warfarin  Diet: Full liquid diet  Code Status: Full code    PT/OT Eval Status: Ordered    Dispo -anticipate discharge in 48 to 67 hours       Abilio Preston MD    Thank you Aniya Fitzpatrick MD for the opportunity to be involved in this patient's care. If you have any questions or concerns please feel free to contact me at [phone].

## 2021-02-27 NOTE — ED NOTES
Bed: A05-05  Expected date:   Expected time:   Means of arrival:   Comments:  [id number] Michael Oquendo RN  02/27/21 [id number]

## 2021-02-27 NOTE — ED PROVIDER NOTES
He has a past medical history of Allergy to sunlight, Atrial fibrillation ([address] BPH (benign prostatic hyperplasia), Gout, HTN (hypertension), Hyperlipidemia, Hypothyroidism, and Pacemaker. He has a past surgical history that includes Spine surgery (2007); Skin cancer excision; Tibia fracture surgery; and Skin cancer excision (2012). His family history includes Other in his sister. He reports that he quit smoking about 58 years ago. His smoking use included cigarettes. He has a 10.00 pack-year smoking history. He has never used smokeless tobacco. He reports current alcohol use. He reports that he does not use drugs. Medications     Previous Medications    ACETAMINOPHEN (TYLENOL) 500 MG TABLET    Take 500 mg by mouth nightly as needed for Pain    CALCIUM CARBONATE (TUMS) 500 MG CHEWABLE TABLET    Take 1 tablet by mouth 3 times daily as needed for Heartburn    CALCIUM-VITAMIN D (OSCAL-500) 500-200 MG-UNIT PER TABLET    Take 1 tablet by mouth daily    CHOLECALCIFEROL 400 UNIT TABS TABLET    Take 400 Units by mouth daily    COENZYME Q10 (EQL COQ10) 300 MG CAPS    Take 300 mg by mouth daily    ESZOPICLONE (LUNESTA) 2 MG TABS    Take 1 tablet by mouth nightly    FEBUXOSTAT 80 MG TABS    Take 80 mg by mouth daily as needed    FINASTERIDE (PROSCAR) 5 MG TABLET    Take 5 mg by mouth daily     FISH OIL    Take 500 mg by mouth daily     FUROSEMIDE (LASIX) 40 MG TABLET    TAKE 1 TABLET BY MOUTH TWICE DAILY    GLUCOSE BLOOD VI TEST STRIPS (FREESTYLE LITE) STRIP    Test once daily    LANSOPRAZOLE (PREVACID) 15 MG DELAYED RELEASE CAPSULE    Take 15 mg by mouth daily as needed    LEVOTHYROXINE (SYNTHROID) 100 MCG TABLET    Take 1 tablet by mouth daily. MELATONIN 3 MG TABS TABLET    Take 3 mg by mouth nightly as needed    MOMETASONE (NASONEX) 50 MCG/ACT NASAL SPRAY    2 sprays by Nasal route daily.     MULTIPLE VITAMIN (MULTIVITAMIN PO)    Take 1 tablet by mouth     SIMVASTATIN (ZOCOR) 10 MG TABLET    Take 1 tablet by mouth nightly. SUVOREXANT (BELSOMRA) 10 MG TABS    Take 10 mg by mouth nightly as needed. TEMAZEPAM (RESTORIL) 15 MG CAPSULE    TAKE ONE CAPSULE BY MOUTH EVERY NIGHT AT BEDTIME AS NEEDED FOR SLEEP    TESTOSTERONE ENANTHATE (DELATESTRYL) 200 MG/ML INJECTION    USE AS DIRECTED, INJECTING EVERY 2 WEEKS    VITAMIN B-12 (CYANOCOBALAMIN) 1000 MCG TABLET    Take 1,000 mcg by mouth daily    WARFARIN (COUMADIN) 5 MG TABLET    5 mg every day       Allergies     He is allergic to allopurinol; penicillins; ciprofloxacin; iodides; sulfa antibiotics; sulfamethoxazole-trimethoprim; and cephalosporins. Physical Exam     INITIAL VITALS: BP: (!) 168/90, Temp: 98 °F (36.7 °C), Pulse: 60, Resp: 17, SpO2: 96 %   Physical Exam  Constitutional:       General: He is not in acute distress. Appearance: He is well-developed. He is not ill-appearing, toxic-appearing or diaphoretic. HENT:      Head: Normocephalic and atraumatic. Cardiovascular:      Rate and Rhythm: Normal rate and regular rhythm. Pulmonary:      Effort: Pulmonary effort is normal. No respiratory distress. Chest:      Chest wall: No tenderness. Abdominal:      General: There is distension (patient reports). Palpations: Abdomen is soft. Tenderness: There is abdominal tenderness in the right upper quadrant and epigastric area. There is no right CVA tenderness, left CVA tenderness, guarding or rebound. Positive signs include Luu's sign. Skin:     General: Skin is warm and dry. Capillary Refill: Capillary refill takes less than 2 seconds. Findings: No rash. Neurological:      General: No focal deficit present. Mental Status: He is alert. Psychiatric:         Mood and Affect: Mood normal.         Diagnostic Results     RADIOLOGY:  CT ABDOMEN PELVIS WO CONTRAST   Final Result      1. Nonobstructive bowel gas pattern. 2.  Multiple bilateral nonobstructing renal calculi. 3.  Prostatic hypertrophy.       4.  Multilevel degenerative spondylosis with degenerative endplate changes and mild superior T12 endplate compression, age indeterminate.           LABS:   Results for orders placed or performed during the hospital encounter of 02/27/21   CBC Auto Differential   Result Value Ref Range    WBC 9.7 4.0 - 11.0 K/uL    RBC 5.21 4.20 - 5.90 M/uL    Hemoglobin 14.9 13.5 - 17.5 g/dL    Hematocrit 45.6 40.5 - 52.5 %    MCV 87.6 80.0 - 100.0 fL    MCH 28.6 26.0 - 34.0 pg    MCHC 32.7 31.0 - 36.0 g/dL    RDW 15.1 12.4 - 15.4 %    Platelets 033 324 - 863 K/uL    MPV 7.6 5.0 - 10.5 fL    PLATELET SLIDE REVIEW Decreased     SLIDE REVIEW see below     Neutrophils % 72.8 %    Lymphocytes % 14.8 %    Monocytes % 7.3 %    Eosinophils % 4.3 %    Basophils % 0.8 %    Neutrophils Absolute 7.1 1.7 - 7.7 K/uL    Lymphocytes Absolute 1.4 1.0 - 5.1 K/uL    Monocytes Absolute 0.7 0.0 - 1.3 K/uL    Eosinophils Absolute 0.4 0.0 - 0.6 K/uL    Basophils Absolute 0.1 0.0 - 0.2 K/uL   Comprehensive Metabolic Panel w/ Reflex to MG   Result Value Ref Range    Sodium 137 136 - 145 mmol/L    Potassium reflex Magnesium 4.4 3.5 - 5.1 mmol/L    Chloride 99 99 - 110 mmol/L    CO2 28 21 - 32 mmol/L    Anion Gap 10 3 - 16    Glucose 125 (H) 70 - 99 mg/dL    BUN 37 (H) 7 - 20 mg/dL    CREATININE 2.5 (H) 0.8 - 1.3 mg/dL    GFR Non-African American 24 (A) >60    GFR  30 (A) >60    Calcium 10.5 8.3 - 10.6 mg/dL    Total Protein 7.8 6.4 - 8.2 g/dL    Albumin 4.4 3.4 - 5.0 g/dL    Albumin/Globulin Ratio 1.3 1.1 - 2.2    Total Bilirubin 0.3 0.0 - 1.0 mg/dL    Alkaline Phosphatase 48 40 - 129 U/L    ALT 18 10 - 40 U/L    AST 31 15 - 37 U/L    Globulin 3.4 g/dL   Lipase   Result Value Ref Range    Lipase 30.0 13.0 - 60.0 U/L   Troponin   Result Value Ref Range    Troponin 0.01 <0.01 ng/mL   Protime-INR   Result Value Ref Range    Protime 29.6 (H) 10.0 - 13.2 sec    INR 2.53 (H) 0.86 - 1.14   APTT   Result Value Ref Range    aPTT 45.5 (H) 24.2 - 36.2 sec Urinalysis, reflex to microscopic   Result Value Ref Range    Color, UA Yellow Straw/Yellow    Clarity, UA Clear Clear    Glucose, Ur Negative Negative mg/dL    Bilirubin Urine Negative Negative    Ketones, Urine Negative Negative mg/dL    Specific Gravity, UA 1.010 1.005 - 1.030    Blood, Urine Negative Negative    pH, UA 6.0 5.0 - 8.0    Protein, UA TRACE (A) Negative mg/dL    Urobilinogen, Urine 0.2 <2.0 E.U./dL    Nitrite, Urine Negative Negative    Leukocyte Esterase, Urine TRACE (A) Negative    Microscopic Examination YES     Urine Type Other    Lactic Acid, Plasma   Result Value Ref Range    Lactic Acid 1.0 0.4 - 2.0 mmol/L   Microscopic Urinalysis   Result Value Ref Range    WBC, UA 0-2 0 - 5 /HPF    RBC, UA None seen 0 - 4 /HPF    Epithelial Cells, UA 0-1 0 - 5 /HPF   TYPE AND SCREEN   Result Value Ref Range    ABO/Rh AB POS     Antibody Screen NEG        RECENT VITALS:  BP: (!) 168/90,Temp: 98 °F (36.7 °C), Pulse: 60, Resp: 17, SpO2: 98 %     Procedures     ED Course     Nursing Notes, Past Medical Hx, Past Surgical Hx, Social Hx,Allergies, and Family Hx were reviewed. patient was given the following medications:  Orders Placed This Encounter   Medications    dexamethasone (DECADRON) injection 10 mg    diphenhydrAMINE (BENADRYL) injection 25 mg    morphine injection 4 mg       CONSULTS:  IP CONSULT TO HOSPITALIST  IP CONSULT TO [address] [place] [address] DECISIONMAKING / ASSESSMENT / Trially Vinicio is a 80 y.o. male who presents with abdominal pain and reported distention. On arrival patient in no acute distress, declines pain medication, abdomen soft but patient reports that is distended and he does have epigastric and right upper quadrant tenderness. Given lack of passing gas, colicky nature of pain and patient's age we will plan to obtain CT scan of the abdomen pelvis as well as basic abdominal labs including lactate and type and screen/PTT given patient is on Coumadin.     Patient's laboratory work notable for creatinine of 2.5 up from patient's baseline of approximately 1.92, urinalysis without concern for infection or bleeding and INR 2.5. Patient's lactic acid was within normal limits and the remainder of his laboratory work was reassuring. Patient received steroid and Benadryl given listed iodine as allergy however patient does not know how this ended up in his chart and denies history of known allergic reaction to IV contrast or iodine. Given patient's acute kidney injury we are not able to obtain CT scan with contrast however CT scan without contrast shows no acute intra-abdominal process to explain patient's pain, specifically no obvious kidney stone or hydronephrosis, no obvious obstruction and no obvious signs of cholecystitis. On repeat evaluation patient continues to have significant abdominal pain and tenderness, is agreeable to morphine at this time. Given patient's acute kidney injury and continued abdominal pain without identified cause we will plan on admission to hospital for IV hydration and serial abdominal exams, I have personally spoken with the accepting medical provider. Clinical Impression     1. Generalized abdominal pain    2. ERIC (acute kidney injury) ([facility] [address] [facility]        Disposition     PATIENT REFERRED TO:  No follow-up provider specified.     DISCHARGE MEDICATIONS:  New Prescriptions    No medications on file       DISPOSITION Admitted 02/27/2021 05:31:36 PM       Valentina Mcclendon MD  02/27/21 [id number]

## 2021-02-28 VITALS
HEIGHT: 72 IN | BODY MASS INDEX: 26.41 KG/M2 | RESPIRATION RATE: 18 BRPM | TEMPERATURE: 97.7 F | SYSTOLIC BLOOD PRESSURE: 167 MMHG | DIASTOLIC BLOOD PRESSURE: 93 MMHG | OXYGEN SATURATION: 97 % | HEART RATE: 60 BPM | WEIGHT: 195 LBS

## 2021-02-28 LAB
ANION GAP SERPL CALCULATED.3IONS-SCNC: 11 MMOL/L (ref 3–16)
BUN BLDV-MCNC: 39 MG/DL (ref 7–20)
CALCIUM SERPL-MCNC: 10.8 MG/DL (ref 8.3–10.6)
CHLORIDE BLD-SCNC: 101 MMOL/L (ref 99–110)
CO2: 26 MMOL/L (ref 21–32)
CREAT SERPL-MCNC: 2.5 MG/DL (ref 0.8–1.3)
EKG ATRIAL RATE: 468 BPM
EKG DIAGNOSIS: NORMAL
EKG Q-T INTERVAL: 504 MS
EKG QRS DURATION: 168 MS
EKG QTC CALCULATION (BAZETT): 504 MS
EKG R AXIS: 128 DEGREES
EKG T AXIS: 67 DEGREES
EKG VENTRICULAR RATE: 60 BPM
GFR AFRICAN AMERICAN: 30
GFR NON-AFRICAN AMERICAN: 24
GLUCOSE BLD-MCNC: 170 MG/DL (ref 70–99)
HCT VFR BLD CALC: 47.9 % (ref 40.5–52.5)
HEMOGLOBIN: 15.5 G/DL (ref 13.5–17.5)
INR BLD: 2.47 (ref 0.86–1.14)
MCH RBC QN AUTO: 28.4 PG (ref 26–34)
MCHC RBC AUTO-ENTMCNC: 32.3 G/DL (ref 31–36)
MCV RBC AUTO: 87.9 FL (ref 80–100)
PDW BLD-RTO: 14.9 % (ref 12.4–15.4)
PLATELET # BLD: 210 K/UL (ref 135–450)
PMV BLD AUTO: 8.2 FL (ref 5–10.5)
POTASSIUM REFLEX MAGNESIUM: 4.5 MMOL/L (ref 3.5–5.1)
PROTHROMBIN TIME: 28.9 SEC (ref 10–13.2)
RBC # BLD: 5.45 M/UL (ref 4.2–5.9)
SODIUM BLD-SCNC: 138 MMOL/L (ref 136–145)
WBC # BLD: 13.5 K/UL (ref 4–11)

## 2021-02-28 PROCEDURE — 36415 COLL VENOUS BLD VENIPUNCTURE: CPT

## 2021-02-28 PROCEDURE — 6370000000 HC RX 637 (ALT 250 FOR IP): Performed by: INTERNAL MEDICINE

## 2021-02-28 PROCEDURE — 80048 BASIC METABOLIC PNL TOTAL CA: CPT

## 2021-02-28 PROCEDURE — 99222 1ST HOSP IP/OBS MODERATE 55: CPT | Performed by: SURGERY

## 2021-02-28 PROCEDURE — G0378 HOSPITAL OBSERVATION PER HR: HCPCS

## 2021-02-28 PROCEDURE — 6360000002 HC RX W HCPCS: Performed by: INTERNAL MEDICINE

## 2021-02-28 PROCEDURE — 96374 THER/PROPH/DIAG INJ IV PUSH: CPT

## 2021-02-28 PROCEDURE — 85610 PROTHROMBIN TIME: CPT

## 2021-02-28 PROCEDURE — 85027 COMPLETE CBC AUTOMATED: CPT

## 2021-02-28 PROCEDURE — 2580000003 HC RX 258: Performed by: INTERNAL MEDICINE

## 2021-02-28 RX ADMIN — LEVOTHYROXINE SODIUM 125 MCG: 125 TABLET ORAL at 05:29

## 2021-02-28 RX ADMIN — Medication 10 ML: at 08:43

## 2021-02-28 RX ADMIN — CYANOCOBALAMIN TAB 1000 MCG 1000 MCG: 1000 TAB at 08:42

## 2021-02-28 RX ADMIN — HYDRALAZINE HYDROCHLORIDE 10 MG: 20 INJECTION INTRAMUSCULAR; INTRAVENOUS at 08:46

## 2021-02-28 RX ADMIN — CALCIUM CARBONATE-VITAMIN D TAB 500 MG-200 UNIT 1 TABLET: 500-200 TAB at 08:42

## 2021-02-28 RX ADMIN — Medication 400 UNITS: at 08:43

## 2021-02-28 ASSESSMENT — PAIN SCALES - GENERAL: PAINLEVEL_OUTOF10: 0

## 2021-02-28 NOTE — CARE COORDINATION
Current PT AM-PAC Score:   /24  Current OT AM-PAC Score:   /24      Discharge Disposition: Home- No Services Needed    Transportation:  Transportation Plan for discharge: self     Home Care:  Home Care ordered at discharge: Not Indicated      Durable Medical Equipment:  DME Provider: none   Equipment obtained during hospitalization: No dME     Home Oxygen and Respiratory Equipment:  Oxygen needed at discharge?: Not Indicated    Referrals [facility] at [facility] for outpatient continued care:  Not Applicable    Additional CM Notes:   Patient medically ready for discharge. Patient to return home independently. No home care or discharge needs noted. Transportation arranged. The Plan for Transition of Care is related to the following treatment goals Abdominal pain [R10.9]      The Patient and/or patient representative Patient was provided with a choice of provider and agrees with the discharge plan Yes    Freedom of choice list was provided with basic dialogue that supports the patient's individualized plan of care/goals and shares the quality data associated with the providers.  Yes    Care Transitions patient: No    Randy Overall Day, MSW  [facility] [employer] - weekend  Case Management Department  Ph: [phone]

## 2021-02-28 NOTE — PROGRESS NOTES
Pt adamant about leaving today. RN explained that creatinine is still elevated and nephrology is to see him/plan of care. States he is tolerating fulls and is asking for general diet. States his abdominal pain has resolved and he is passing gas. RN informed Dr Anthony Altamirano via perfect serve.      Electronically signed by Elma Almonte on 2/28/2021 at 8:22 AM

## 2021-02-28 NOTE — H&P
General Surgery   History and Physical       Chief Complaint: Abdominal pain      History obtained from: Patient      History of Present Illness:    Pia Reid is a 80 y.o. male with a history of Afib and hypertension admitted with diffuse waxing and waning abdominal pain. Patient states the pain started suddenly yesterday morning, starts at a 3/10, slowly increases to a 9/10, and then slowly decreases back to a 3/10. The pain involves the entire abdomen and does not radiate. He has associated nausea but denies emesis, fever/chills, dysuria, chest pain, and back pain. He has never had pain like this before and nothing makes it better or worse. Past Medical History:        Diagnosis Date    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  Atrial fibrillation ([address] 2006    on event monitor    BPH (benign prostatic hyperplasia)     Gout     HTN (hypertension)     Hyperlipidemia     Hypothyroidism     Pacemaker 2006         Past Surgical History:           Procedure Laterality Date    SKIN CANCER EXCISION      SKIN CANCER EXCISION  2012    melanoma in situ [address]  2007    L4-5    TIBIA FRACTURE SURGERY           Allergies:  Allopurinol, Penicillins, Ciprofloxacin, Iodides, Sulfa antibiotics, Sulfamethoxazole-trimethoprim, and Cephalosporins      Medications:   Home Meds  No current facility-administered medications on file prior to encounter.       Current Outpatient Medications on File Prior to Encounter   Medication Sig Dispense Refill    amLODIPine (NORVASC) 5 MG tablet Take 5 mg by mouth daily      Glutathione 50 MG TABS Take by mouth      calcium-vitamin D (OSCAL-500) 500-200 MG-UNIT per tablet Take 1 tablet by mouth daily      Febuxostat 80 MG TABS Take 80 mg by mouth daily as needed      melatonin 3 MG TABS tablet Take 3 mg by mouth nightly as needed      acetaminophen (TYLENOL) 500 MG tablet Take 500 mg by mouth nightly as needed for Pain Musculoskeletal: Negative for new gait problem, joint swelling and myalgias. Skin: Negative for color change, pallor and rash. Endocrine: negative for tremors, temperature intolerance or polydipsia. Allergic/Immunologic: Negative for new environmental or food allergies. Neurological: Negative for dizziness, seizures, speech difficulty, numbness. Hematological: Negative for adenopathy. Psychiatric/Behavioral: Negative for agitation and confusion.       Physical exam:    Vitals:    02/27/21 1943 02/27/21 2315 02/28/21 0530 02/28/21 0831   BP: (!) 153/89 (!) 140/73 (!) 165/88 (!) 167/93   Pulse: 66 62 57 60   Resp: 18 18 18 18   Temp: 97.3 °F (36.3 °C) 98.1 °F (36.7 °C) 97.4 °F (36.3 °C) 97.7 °F (36.5 °C)   TempSrc: Oral Oral Oral Oral   SpO2:  99% 94% 97%   Weight: 195 lb (88.5 kg)      Height: 6' (1.829 m)          General appearance: alert, no acute distress, grooming appropriate  Eyes: PERRLA, no scleral icterus  Neck: trachea midline, no JVD, no lymphadenopathy  Chest/Lungs: CTAB, no crackles/rales, wheezes/rhonchi, normal effort  Cardiovascular: RRR, no murmurs/gallops/rubs  Abdomen: soft, non-tender, non-distended, +BS, no guarding/rigidity  Skin: warm and dry, no rashes  Extremities: no edema, no cyanosis  Neuro: A&Ox3, no focal deficits, sensation intact      Labs:    CBC:   Recent Labs     02/27/21  1515 02/28/21 0527   WBC 9.7 13.5*   HGB 14.9 15.5   HCT 45.6 47.9   MCV 87.6 87.9    210     BMP:   Recent Labs     02/27/21  1516 02/28/21 0527    138   K 4.4 4.5   CL 99 101   CO2 28 26   BUN 37* 39*   CREATININE 2.5* 2.5*     PT/INR:   Recent Labs     02/27/21  1515 02/28/21 0527   PROTIME 29.6* 28.9*   INR 2.53* 2.47*     APTT:   Recent Labs     02/27/21  1515   APTT 45.5*     Liver Profile:  Lab Results   Component Value Date    AST 31 02/27/2021    ALT 18 02/27/2021    BILITOT 0.3 02/27/2021    ALKPHOS 48 02/27/2021     Lab Results   Component Value Date    CHOL 124 03/03/2015 HDL 40 03/03/2015    HDL 43 04/18/2012    TRIG 156 03/03/2015     UA:   Lab Results   Component Value Date    COLORU Yellow 02/27/2021    PHUR 6.0 02/27/2021    WBCUA 0-2 02/27/2021    RBCUA None seen 02/27/2021    BACTERIA RARE 10/15/2020    CLARITYU Clear 02/27/2021    SPECGRAV 1.010 02/27/2021    LEUKOCYTESUR TRACE 02/27/2021    UROBILINOGEN 0.2 02/27/2021    BILIRUBINUR Negative 02/27/2021    BLOODU Negative 02/27/2021    GLUCOSEU Negative 02/27/2021         Imaging:   CT ABDOMEN PELVIS WO CONTRAST   Final Result      1. Nonobstructive bowel gas pattern. 2.  Multiple bilateral nonobstructing renal calculi. 3.  Prostatic hypertrophy. 4.  Multilevel degenerative spondylosis with degenerative endplate changes and mild superior T12 endplate compression, age indeterminate. US RENAL COMPLETE    (Results Pending)          Assessment/Plan:   This is a 80 y.o. male with a history of ***, presenting with ***.    -   -   -     Salma Dimas MD  General Surgery, PGY1  02/28/21  9:43 AM  [phone]

## 2021-02-28 NOTE — DISCHARGE SUMMARY
Melatonin and as needed Ativan was continued    Physical Exam Performed:     BP (!) 167/93   Pulse 60   Temp 97.7 °F (36.5 °C) (Oral)   Resp 18   Ht 6' (1.829 m)   Wt 195 lb (88.5 kg)   SpO2 97%   BMI 26.45 kg/m²       General appearance:  No apparent distress, appears stated age and cooperative. HEENT:  Normal cephalic, atraumatic without obvious deformity. Pupils equal, round, and reactive to light. Extra ocular muscles intact. Conjunctivae/corneas clear. Neck: Supple, with full range of motion. No jugular venous distention. Trachea midline. Respiratory:  Normal respiratory effort. Clear to auscultation, bilaterally without Rales/Wheezes/Rhonchi. Cardiovascular:  Regular rate and rhythm with normal S1/S2 without murmurs, rubs or gallops. Abdomen: Soft, non-tender, non-distended with normal bowel sounds. Musculoskeletal:  No clubbing, cyanosis or edema bilaterally. Full range of motion without deformity. Skin: Skin color, texture, turgor normal.  No rashes or lesions. Neurologic:  Grossly non-focal.  Psychiatric:  Alert and oriented  Capillary Refill: Brisk,< 3 seconds   Peripheral Pulses: +2 palpable, equal bilaterally       Labs: For convenience and continuity at follow-up the following most recent labs are provided:      CBC:    Lab Results   Component Value Date    WBC 13.5 02/28/2021    HGB 15.5 02/28/2021    HCT 47.9 02/28/2021     02/28/2021       Renal:    Lab Results   Component Value Date     02/28/2021    K 4.5 02/28/2021     02/28/2021    CO2 26 02/28/2021    BUN 39 02/28/2021    CREATININE 2.5 02/28/2021    CALCIUM 10.8 02/28/2021    PHOS 2.6 10/15/2020         Significant Diagnostic Studies    Radiology:   CT ABDOMEN PELVIS WO CONTRAST   Final Result      1. Nonobstructive bowel gas pattern. 2.  Multiple bilateral nonobstructing renal calculi. 3.  Prostatic hypertrophy.       4.  Multilevel degenerative spondylosis with degenerative endplate changes and mouth nightly. Qty: 30 tablet      Multiple Vitamin (MULTIVITAMIN PO) Take 1 tablet by mouth       FISH OIL Take 500 mg by mouth daily       finasteride (PROSCAR) 5 MG tablet Take 5 mg by mouth daily       mupirocin (BACTROBAN) 2 % ointment Apply daily prn intranasal      Cholecalciferol 400 UNIT TABS tablet Take 400 Units by mouth daily      warfarin (COUMADIN) 5 MG tablet 5 mg every day  Qty: 30 tablet      glucose blood VI test strips (FREESTYLE LITE) strip Test once daily  Qty: 90 strip, Refills: 1             Time Spent on discharge is more than 30 minutes in the examination, evaluation, counseling and review of medications and discharge plan. Signed:    Supriya Garner MD   2/28/2021      Thank you Mariluz Del Toro MD for the opportunity to be involved in this patient's care. If you have any questions or concerns please feel free to contact me at [phone].

## 2021-02-28 NOTE — CONSULTS
General Surgery   Resident Consult Note    Reason for Consult: Ileus    History of Present Illness:   Rupal Bill is a 80 y.o. male with history of atrial fibrillation on Warfarin, heart block s/p pacemaker, and hypertension who presented to [facility] on 2/27 with abdominal pain. He reports onset of his pain on the morning of his presentation, generalized throughout the abdomen. It has been intermittent and cramping in nature. Additionally, he reports inability to pass flatus since onset of his pain. He does note passing two small BMs yesterday. He has a history of constipation, for which he takes Miralax regularly. He had a primary umbilical hernia repair 2 years ago with Dr. Jae Fox but has otherwise not had any abdominal surgeries. He denies any history of IBD. He also denies any family history of colorectal cancer. He denies any other symptoms at this time, including nausea, vomiting, diarrhea, bloody BMs, chest pain, or dyspnea. Past Medical History:        Diagnosis Date    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  Atrial fibrillation ([address] 2006    on event monitor    BPH (benign prostatic hyperplasia)     Gout     HTN (hypertension)     Hyperlipidemia     Hypothyroidism     Pacemaker 2006       Past Surgical History:        Procedure Laterality Date    SKIN CANCER EXCISION      SKIN CANCER EXCISION  2012    melanoma in situ [address]  2007    L4-5    TIBIA FRACTURE SURGERY         Allergies:  Allopurinol, Penicillins, Ciprofloxacin, Iodides, Sulfa antibiotics, Sulfamethoxazole-trimethoprim, and Cephalosporins    Medications:   Home Meds  No current facility-administered medications on file prior to encounter.       Current Outpatient Medications on File Prior to Encounter   Medication Sig Dispense Refill    amLODIPine (NORVASC) 5 MG tablet Take 5 mg by mouth daily      Glutathione 50 MG TABS Take by mouth      calcium-vitamin D (OSCAL-500) 500-200 MG-UNIT per tablet Take 1 tablet by mouth daily      Febuxostat 80 MG TABS Take 80 mg by mouth daily as needed      melatonin 3 MG TABS tablet Take 3 mg by mouth nightly as needed      acetaminophen (TYLENOL) 500 MG tablet Take 500 mg by mouth nightly as needed for Pain      vitamin B-12 (CYANOCOBALAMIN) 1000 MCG tablet Take 1,000 mcg by mouth daily      Coenzyme Q10 (EQL COQ10) 300 MG CAPS Take 300 mg by mouth daily      calcium carbonate (TUMS) 500 MG chewable tablet Take 1 tablet by mouth 3 times daily as needed for Heartburn      Suvorexant (BELSOMRA) 10 MG TABS Take 10 mg by mouth nightly as needed.  temazepam (RESTORIL) 15 MG capsule TAKE ONE CAPSULE BY MOUTH EVERY NIGHT AT BEDTIME AS NEEDED FOR SLEEP 30 capsule 0    testosterone enanthate (DELATESTRYL) 200 MG/ML injection USE AS DIRECTED, INJECTING EVERY 2 WEEKS      mometasone (NASONEX) 50 MCG/ACT nasal spray 2 sprays by Nasal route daily. 1 Inhaler 3    furosemide (LASIX) 40 MG tablet TAKE 1 TABLET BY MOUTH TWICE DAILY (Patient taking differently: 40 mg 2 times daily as needed ) 180 tablet 0    levothyroxine (SYNTHROID) 100 MCG tablet Take 1 tablet by mouth daily. (Patient taking differently: Take 125 mcg by mouth daily ) 90 tablet 1    simvastatin (ZOCOR) 10 MG tablet Take 1 tablet by mouth nightly.  30 tablet     Multiple Vitamin (MULTIVITAMIN PO) Take 1 tablet by mouth       FISH OIL Take 500 mg by mouth daily       finasteride (PROSCAR) 5 MG tablet Take 5 mg by mouth daily       mupirocin (BACTROBAN) 2 % ointment Apply daily prn intranasal      Cholecalciferol 400 UNIT TABS tablet Take 400 Units by mouth daily      warfarin (COUMADIN) 5 MG tablet 5 mg every day 30 tablet     glucose blood VI test strips (FREESTYLE LITE) strip Test once daily 90 strip 1       Current Meds      calcium carbonate (TUMS) chewable tablet 500 mg, TID PRN      calcium-vitamin D (OSCAL-500) 500-200 MG-UNIT per tablet 1 tablet, Daily      vitamin D3 (CHOLECALCIFEROL) tablet 400 Units, Daily      Coenzyme Q10 CAPS 300 mg, Daily      finasteride (PROSCAR) tablet 5 mg, Daily      pantoprazole (PROTONIX) tablet 40 mg, QAM AC      levothyroxine (SYNTHROID) tablet 125 mcg, Daily      melatonin tablet 3 mg, Nightly PRN      fluticasone (FLONASE) 50 MCG/ACT nasal spray 2 spray, Daily      atorvastatin (LIPITOR) tablet 10 mg, Nightly      vitamin B-12 (CYANOCOBALAMIN) tablet 1,000 mcg, Daily      warfarin (COUMADIN) tablet 5 mg, Daily      sodium chloride flush 0.9 % injection 10 mL, 2 times per day      sodium chloride flush 0.9 % injection 10 mL, PRN      promethazine (PHENERGAN) tablet 12.5 mg, Q6H PRN    Or      ondansetron (ZOFRAN) injection 4 mg, Q6H PRN      polyethylene glycol (GLYCOLAX) packet 17 g, Daily PRN      acetaminophen (TYLENOL) tablet 650 mg, Q6H PRN    Or      acetaminophen (TYLENOL) suppository 650 mg, Q6H PRN      oxyCODONE (ROXICODONE) immediate release tablet 5 mg, Q4H PRN      HYDROmorphone (DILAUDID) injection 0.5 mg, Q4H PRN      LORazepam (ATIVAN) tablet 0.5 mg, Nightly PRN      hydrALAZINE (APRESOLINE) injection 10 mg, Q6H PRN        Family History:   Family History   Problem Relation Age of Onset    Other Sister         Rheumatic fever       Social History:   TOBACCO:   reports that he quit smoking about 58 years ago. His smoking use included cigarettes. He has a 10.00 pack-year smoking history. He has never used smokeless tobacco.  ETOH:   reports current alcohol use of about 5.0 standard drinks of alcohol per week. DRUGS:   reports no history of drug use. Review of Systems:     Constitutional: Negative. HENT: Negative. Eyes: Negative. Respiratory: Negative. Cardiovascular: Negative. Gastrointestinal: Negative except pain. Genitourinary: Negative. Musculoskeletal: Negative. Skin: Negative. Endocrine: Negative. Allergic/Immunologic: Negative. Neurological: Negative.   Hematological: Negative. Psychiatric/Behavioral: Negative. Physical exam:    Vitals:    02/27/21 1453 02/27/21 1943 02/27/21 2315 02/28/21 0530   BP: (!) 168/90 (!) 153/89 (!) 140/73 (!) 165/88   Pulse: 60 66 62 57   Resp: 17 18 18 18   Temp: 98 °F (36.7 °C) 97.3 °F (36.3 °C) 98.1 °F (36.7 °C) 97.4 °F (36.3 °C)   TempSrc: Oral Oral Oral Oral   SpO2: 98%  99% 94%   Weight:  195 lb (88.5 kg)     Height:  6' (1.829 m)         General appearance: alert, no acute distress, grooming appropriate  HEENT: Normocephalic, atraumatic; EOMI; moist mucous membranes  Neck: trachea midline, no JVD, no lymphadenopathy  Chest/Lungs: Normal inspiratory effort, symmetric chest rise, no accessory muscle use  Cardiovascular: Regular rate and rhythm; perfusing extremities  Abdomen: soft, non-distended, non-tender to palpation throughout  Skin: warm and dry, no rashes  Extremities: no edema, no cyanosis  Neuro: A&Ox3, no gross sensory or motor neuro deficits    Labs:    CBC:   Recent Labs     02/27/21  1515 02/28/21  0527   WBC 9.7 13.5*   HGB 14.9 15.5   HCT 45.6 47.9   MCV 87.6 87.9    210     BMP:   Recent Labs     02/27/21  1516 02/28/21  0527    138   K 4.4 4.5   CL 99 101   CO2 28 26   BUN 37* 39*   CREATININE 2.5* 2.5*     Liver Profile:   Lab Results   Component Value Date    AST 31 02/27/2021    ALT 18 02/27/2021    BILITOT 0.3 02/27/2021    ALKPHOS 48 02/27/2021     Lab Results   Component Value Date    CHOL 124 03/03/2015    HDL 40 03/03/2015    HDL 43 04/18/2012    TRIG 156 03/03/2015     PT/INR:   Recent Labs     02/27/21  1515 02/28/21  0527   PROTIME 29.6* 28.9*   INR 2.53* 2.47*         Imaging:   CT ABDOMEN PELVIS WO CONTRAST   Final Result      1. Nonobstructive bowel gas pattern. 2.  Multiple bilateral nonobstructing renal calculi. 3.  Prostatic hypertrophy. 4.  Multilevel degenerative spondylosis with degenerative endplate changes and mild superior T12 endplate compression, age indeterminate.       [address] RENAL COMPLETE    (Results Pending)         Assessment/Plan:  Karissa Zuniga is a 80 y.o. male with history of atrial fibrillation on Warfarin, heart block s/p pacemaker, and hypertension who presented to [facility] on 2/27 for abdominal pain. Appears to be related to his prior constipation; no sign of ileus or small bowel obstruction on work-up.     - Continue bowel regimen  - Okay for diet  - No surgical intervention indicated at this time  - Further medical management per primary team    Melissa Peres MD PGY-1  02/28/21  8:15 AM  [phone]

## 2021-02-28 NOTE — PLAN OF CARE
Problem: Pain:  Goal: Pain level will decrease  Description: Pain level will decrease  Note: Patient states he hasn't had any more abdominal pain since he was able to pass gas earlier

## 2021-02-28 NOTE — PROGRESS NOTES
Patient states pain resolved since he was able to pass gas, abdomen soft and non tender, vitals stable, assisted to position of comfort, patient encouraged to call with needs, call light in reach, items within reach, will continue to monitor

## 2021-02-28 NOTE — PROGRESS NOTES
Discharge information reviewed w pt. He denied questions/concerns for discharge. Pt walked to his car by RN.

## 2021-03-01 LAB
CREATININE URINE: 71.9 MG/DL (ref 39–259)
PROTEIN PROTEIN: 106 MG/DL
PROTEIN/CREAT RATIO: 1.5 MG/DL

## 2021-05-13 ENCOUNTER — OFFICE VISIT (OUTPATIENT)
Dept: PULMONOLOGY | Age: 86
End: 2021-05-13
Payer: MEDICARE

## 2021-05-13 VITALS
SYSTOLIC BLOOD PRESSURE: 140 MMHG | HEART RATE: 63 BPM | RESPIRATION RATE: 16 BRPM | WEIGHT: 199 LBS | OXYGEN SATURATION: 89 % | DIASTOLIC BLOOD PRESSURE: 90 MMHG | BODY MASS INDEX: 26.37 KG/M2 | HEIGHT: 73 IN | TEMPERATURE: 97 F

## 2021-05-13 DIAGNOSIS — J96.01 ACUTE RESPIRATORY FAILURE WITH HYPOXIA (HCC): ICD-10-CM

## 2021-05-13 DIAGNOSIS — J84.112 IPF (IDIOPATHIC PULMONARY FIBROSIS) (HCC): Primary | ICD-10-CM

## 2021-05-13 PROCEDURE — 99204 OFFICE O/P NEW MOD 45 MIN: CPT | Performed by: INTERNAL MEDICINE

## 2021-05-13 NOTE — PROGRESS NOTES
[facility] Pulmonary and Critical Care    Outpatient Initial Note    Subjective:   Referring Physician: Tho Wu / HPI:     The patient is 80 y.o. male who presents today for a new patient visit for dyspnea and hypoxia. Patient was seen in this office many years ago for an abnormal PFT. He had a low forced vital capacity at that time but normal total lung capacity and diffusion. In looking at my notes from 2015 he did have some crackles on exam and I suspected an interstitial lung disease. 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 He reports that he had been breathing fine and exercising regularly until things started getting really bad about 3 weeks ago. He comes in with a family friend whose   of IPF. Patient also sees a cardiologist through [facility] and in reviewing his notes mention that he was having difficulty with memory and that the shortness of breath symptoms may have been going on for a longer period of time. Patient's oxygen saturations on coming to the room were 89%. Patient had a CT of the abdomen done in February of this year that showed progression of basilar interstitial changes and fibrosis compared to the scan from . Patient did not recall having the CT scan. Patient also complains of a dry nonproductive cough       Past Medical History:    Past Medical History:   Diagnosis Date    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      Atrial fibrillation ([address]     on event monitor    BPH (benign prostatic hyperplasia)     Gout     HTN (hypertension)     Hyperlipidemia     Hypothyroidism     Pacemaker        Social History:    Social History     Tobacco Use   Smoking Status Former Smoker    Packs/day: 1.00    Years: 10.00    Pack years: 10.00    Types: Cigarettes    Quit date: 1963    Years since quittin.4   Smokeless Tobacco Never Used       Family History:  Family History Problem Relation Age of Onset    Other Sister         Rheumatic fever     Current Medications:  Current Outpatient Medications on File Prior to Visit   Medication Sig Dispense Refill    amLODIPine (NORVASC) 5 MG tablet Take 5 mg by mouth daily      mupirocin (BACTROBAN) 2 % ointment Apply daily prn intranasal      Glutathione 50 MG TABS Take by mouth      calcium-vitamin D (OSCAL-500) 500-200 MG-UNIT per tablet Take 1 tablet by mouth daily      Febuxostat 80 MG TABS Take 80 mg by mouth daily as needed      melatonin 3 MG TABS tablet Take 3 mg by mouth nightly as needed      acetaminophen (TYLENOL) 500 MG tablet Take 500 mg by mouth nightly as needed for Pain      vitamin B-12 (CYANOCOBALAMIN) 1000 MCG tablet Take 1,000 mcg by mouth daily      Cholecalciferol 400 UNIT TABS tablet Take 400 Units by mouth daily      Coenzyme Q10 (EQL COQ10) 300 MG CAPS Take 300 mg by mouth daily      calcium carbonate (TUMS) 500 MG chewable tablet Take 1 tablet by mouth 3 times daily as needed for Heartburn      Suvorexant (BELSOMRA) 10 MG TABS Take 10 mg by mouth nightly as needed.  temazepam (RESTORIL) 15 MG capsule TAKE ONE CAPSULE BY MOUTH EVERY NIGHT AT BEDTIME AS NEEDED FOR SLEEP 30 capsule 0    testosterone enanthate (DELATESTRYL) 200 MG/ML injection USE AS DIRECTED, INJECTING EVERY 2 WEEKS      mometasone (NASONEX) 50 MCG/ACT nasal spray 2 sprays by Nasal route daily. 1 Inhaler 3    levothyroxine (SYNTHROID) 100 MCG tablet Take 1 tablet by mouth daily. (Patient taking differently: Take 125 mcg by mouth daily ) 90 tablet 1    warfarin (COUMADIN) 5 MG tablet 5 mg every day 30 tablet     glucose blood VI test strips (FREESTYLE LITE) strip Test once daily 90 strip 1    simvastatin (ZOCOR) 10 MG tablet Take 1 tablet by mouth nightly.  30 tablet     Multiple Vitamin (MULTIVITAMIN PO) Take 1 tablet by mouth       FISH OIL Take 500 mg by mouth daily       finasteride (PROSCAR) 5 MG tablet Take 5 mg by mouth daily        No current facility-administered medications on file prior to visit. Allergies: Allergies   Allergen Reactions    Allopurinol Other (See Comments)     Sun sensitivity  Sun poisoning      Penicillins Anaphylaxis     Bronchial spasms    Ciprofloxacin Hives    Iodides     Sulfa Antibiotics Hives    Sulfamethoxazole-Trimethoprim     Cephalosporins Rash       REVIEW OF SYSTEMS:    CONSTITUTIONAL: Negative for fevers and chills  HEENT: Negative for oropharyngeal exudate, post nasal drip, sinus pain / pressure, nasal congestion, ear pain  RESPIRATORY:  See HPI  CARDIOVASCULAR: Negative for chest pain, palpitations, edema  GASTROINTESTINAL: Negative for nausea, vomiting, diarrhea, constipation and abdominal pain  HEMATOLOGICAL: Negative for adenopathy  SKIN: Negative for clubbing, cyanosis, skin lesions  EXTREMITIES: Negative for weakness, decreased ROM  NEUROLOGICAL: Negative for unilateral weakness, speech or gait abnormalities  PSYCH: Negative for anxiety, depression    Objective:   PHYSICAL EXAM:        VITALS:  BP (!) 140/90 (Site: Right Upper Arm, Position: Sitting, Cuff Size: Medium Adult)   Pulse 63   Temp 97 °F (36.1 °C) (Infrared)   Resp 16   Ht 6' 1\" (1.854 m)   Wt 199 lb (90.3 kg)   SpO2 (!) 89%   BMI 26.25 kg/m²     CONSTITUTIONAL:  Awake, alert, cooperative, no apparent distress, and appears stated age  HEENT: No oropharyngeal exudate, PERRL, no cervical adenopathy, no tracheal deviation, thyroid size normal  LUNGS:  No increased work of breathing. Inspiratory crackles heard throughout the lung fields posteriorly and some anteriorly. CARDIOVASCULAR:  normal S1 and S2 and no JVD  ABDOMEN:  Normal bowel sounds, non-distended and non-tender to palpation  EXT: No edema, no calf tenderness. Pulses are present bilaterally. NEUROLOGIC:  Mental Status Exam:  Level of Alertness:   awake  Orientation:   person, place, time.   SKIN:  normal skin color, texture, turgor, no redness, warmth, or swelling     DATA:      Radiology Review:  Pertinent images / reports were reviewed as a part of this visit. I personally reviewed his CT from 2015 next to the CT from February of this year. The obvious limitation was that the full lungs were not seen on the CT abdomen. It did show clear progression of septal thickening and subpleural predominant fibrotic changes. Last PFTs: 2015, normal    Immunizations:   Immunization History   Administered Date(s) Administered    COVID-19, Moderna, PF, 100mcg/0.5mL 02/04/2021    Influenza A (H9E9-38) Vaccine PF IM 12/24/2009    Influenza Virus Vaccine 12/24/2009, 09/26/2013, 09/22/2014, 10/25/2018, 03/06/2020, 10/02/2020    Influenza, High Dose (Fluzone 65 yrs and older) 10/14/2015, 10/09/2017    Pneumococcal Conjugate 13-valent ([id number]) 03/06/2015, 10/14/2015    Pneumococcal Polysaccharide (Ozkdaetal12) 01/01/1996    Td (Adult), 2 Lf Tetanus Toxoid, Pf (Td, Absorbed) 07/25/2008    Tdap (Boostrix, Adacel) 07/27/2012, 12/14/2015    Zoster Live (Zostavax) 02/24/2010       Assessment: This is a 80 y.o. male with IPF and acute hypoxemic respiratory failure    Plan:   -Patient's age makes IPF the most likely etiology. The progression is visible even without seeing the entire lungs, however I think getting additional imaging of the entire lungs is important. Which curious is that the progression of disease has been remarkably slow as the mortality at 2 years with IPF is approximately 80% and there is a 6-year interval between these imaging studies. The dry cough and hypoxia are fairly telling as well as the crackles on exam.    I talked to him about treatment with pirfenidone or Esbriet but he was not interested after I mentioned the potential GI side effects and that it only slows the disease by few months. I also discussed getting a repeat CT scan to which he was agreeable and repeat PFTs.   He was also agreeable to the PFTs as he did not remember having them done in the past.  However I do not think they would change my management at this time and we should have reasonable quantification data from the high-res CT. If we do need a PFT we can do so in the future. I had them do a oxygen titration 6-minute walk and despite nearly falling once he did fairly well and would not stop before the 6 minutes was up. He did desaturate from 95% at rest to 86% on exertion and required 3 L of oxygen to maintain a saturation of above 88%. I placed orders for him to get a home concentrator and a portable concentrator. I instructed him to use the device while sleeping and when he exerts himself but that if he has a pulse oximeter and can measure his saturations he can titrate down the oxygen at rest.       Diagnosis Orders   1. IPF (idiopathic pulmonary fibrosis) (HCC)  6 Minute Walk Test    CT CHEST HIGH RESOLUTION       The comment about possible cognitive deficits developing is concerning, but may also be related to his exertional hypoxemia. Hopefully he will have improved cognitive awareness with oxygen therapy. However at almost 80years old some cognitive deterioration is to be expected. - Tobacco use: The patient is not currently smoking.      - RTC 2 months w/ MD. Call or RTC sooner if symptoms persist or worsen acutely.

## 2021-07-06 ENCOUNTER — OFFICE VISIT (OUTPATIENT)
Dept: PULMONOLOGY | Age: 86
End: 2021-07-06
Payer: MEDICARE

## 2021-07-06 VITALS
OXYGEN SATURATION: 98 % | SYSTOLIC BLOOD PRESSURE: 132 MMHG | DIASTOLIC BLOOD PRESSURE: 82 MMHG | BODY MASS INDEX: 26.24 KG/M2 | WEIGHT: 198 LBS | HEART RATE: 64 BPM | RESPIRATION RATE: 16 BRPM | HEIGHT: 73 IN

## 2021-07-06 DIAGNOSIS — J96.11 CHRONIC RESPIRATORY FAILURE WITH HYPOXIA (HCC): ICD-10-CM

## 2021-07-06 DIAGNOSIS — J84.112 IPF (IDIOPATHIC PULMONARY FIBROSIS) (HCC): Primary | ICD-10-CM

## 2021-07-06 PROCEDURE — 99213 OFFICE O/P EST LOW 20 MIN: CPT | Performed by: INTERNAL MEDICINE

## 2021-07-06 NOTE — PROGRESS NOTES
[facility] Pulmonary and Critical Care    Outpatient follow-up note    Subjective:   Referring Physician: Helen Vo / ROSITA:     The patient is 80 y.o. male who presents today for a follow-up visit for dyspnea and hypoxia. 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 He says he does not need it anymore, but he does wear it at night. He was satting 89% when he presented on May 13 but is satting 98% today. He said he had a urinary tract infection soon after seeing me in the office that really laid him flat and it has taken a while to get built back up again. He says his strength and endurance are still not back to where they were before. His cough is resolved however. Initial visit: Patient was seen in this office many years ago for an abnormal PFT. He had a low forced vital capacity at that time but normal total lung capacity and diffusion. In looking at my notes from  he did have some crackles on exam and I suspected an interstitial lung disease. 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 He reports that he had been breathing fine and exercising regularly until things started getting really bad about 3 weeks ago. He comes in with a family friend whose   of IPF. Patient also sees a cardiologist through [facility] and in reviewing his notes mention that he was having difficulty with memory and that the shortness of breath symptoms may have been going on for a longer period of time. Patient's oxygen saturations on coming to the room were 89%. Patient had a CT of the abdomen done in February of this year that showed progression of basilar interstitial changes and fibrosis compared to the scan from 2015. Patient did not recall having the CT scan.   Patient also complains of a dry nonproductive cough       Past Medical History:    Past Medical History:   Diagnosis Date    Allergy to sunlight (SYNTHROID) 100 MCG tablet Take 1 tablet by mouth daily. (Patient taking differently: Take 125 mcg by mouth daily ) 90 tablet 1    warfarin (COUMADIN) 5 MG tablet 5 mg every day 30 tablet     glucose blood VI test strips (FREESTYLE LITE) strip Test once daily 90 strip 1    simvastatin (ZOCOR) 10 MG tablet Take 1 tablet by mouth nightly. 30 tablet     Multiple Vitamin (MULTIVITAMIN PO) Take 1 tablet by mouth       FISH OIL Take 500 mg by mouth daily       finasteride (PROSCAR) 5 MG tablet Take 5 mg by mouth daily        No current facility-administered medications on file prior to visit. Allergies:   Allergies   Allergen Reactions    Allopurinol Other (See Comments)     Sun sensitivity  Sun poisoning      Penicillins Anaphylaxis     Bronchial spasms    Ciprofloxacin Hives    Iodides     Sulfa Antibiotics Hives    Sulfamethoxazole-Trimethoprim     Cephalosporins Rash       REVIEW OF SYSTEMS:    CONSTITUTIONAL: Negative for fevers and chills  HEENT: Negative for oropharyngeal exudate, post nasal drip, sinus pain / pressure, nasal congestion, ear pain  RESPIRATORY:  See HPI  CARDIOVASCULAR: Negative for chest pain, palpitations, edema  GASTROINTESTINAL: Negative for nausea, vomiting, diarrhea, constipation and abdominal pain  HEMATOLOGICAL: Negative for adenopathy  SKIN: Negative for clubbing, cyanosis, skin lesions  EXTREMITIES: Negative for weakness, decreased ROM  NEUROLOGICAL: Negative for unilateral weakness, speech or gait abnormalities  PSYCH: Negative for anxiety, depression    Objective:   PHYSICAL EXAM:        VITALS:  /82 (Site: Right Upper Arm, Position: Sitting, Cuff Size: Large Adult)   Pulse 64   Resp 16   Ht 6' 1\" (1.854 m)   Wt 198 lb (89.8 kg)   SpO2 98%   BMI 26.12 kg/m²     CONSTITUTIONAL:  Awake, alert, cooperative, no apparent distress, and appears stated age  HEENT: No oropharyngeal exudate, PERRL, no cervical adenopathy, no tracheal deviation, thyroid size normal  LUNGS:  No increased work of breathing. Inspiratory crackles heard throughout the lung fields posteriorly and some anteriorly. CARDIOVASCULAR:  normal S1 and S2 and no JVD  ABDOMEN:  Normal bowel sounds, non-distended and non-tender to palpation  EXT: No edema, no calf tenderness. Pulses are present bilaterally. NEUROLOGIC:  Mental Status Exam:  Level of Alertness:   awake  Orientation:   person, place, time. SKIN:  normal skin color, texture, turgor, no redness, warmth, or swelling     DATA:      Radiology Review:  Pertinent images / reports were reviewed as a part of this visit. I personally reviewed his CT from 2015 next to the CT from February of this year. The obvious limitation was that the full lungs were not seen on the CT abdomen. It did show clear progression of septal thickening and subpleural predominant fibrotic changes. Last PFTs: 2015, normal    Immunizations:   Immunization History   Administered Date(s) Administered    COVID-19, Moderna, PF, 100mcg/0.5mL 02/04/2021    Influenza A (E4H6-56) Vaccine PF IM 12/24/2009    Influenza Virus Vaccine 12/24/2009, 09/26/2013, 09/22/2014, 10/25/2018, 03/06/2020, 10/02/2020    Influenza, High Dose (Fluzone 65 yrs and older) 10/14/2015, 10/09/2017    Pneumococcal Conjugate 13-valent ([id number]) 03/06/2015, 10/14/2015    Pneumococcal Polysaccharide ([id number]) 01/01/1996    Td (Adult), 2 Lf Tetanus Toxoid, Pf (Td, Absorbed) 07/25/2008    Tdap (Boostrix, Adacel) 07/27/2012, 12/14/2015    Zoster Live (Zostavax) 02/24/2010       Assessment: This is a 80 y.o. male with IPF and acute hypoxemic respiratory failure    Plan:   -Patient's age makes IPF the most likely etiology. He did not get the CT chest or the pulmonary function tests I ordered last time.   We had discussed possibly treating him with pirfenidone or Esbriet but he was not interested after I mentioned the potential GI side effects and that it only slows the disease by few months. He is currently enrolled in hospice which she says is helping considerably. He made it know that he does not want any aggressive measures to keep him alive. I encouraged him to get a pulse oximeter for use at home to measure his oxygen saturations. I also suggested that when he exercises he may improve his exertional tolerance by wearing the oxygen, if his sats do drop. It is unclear to me why patient who needed 3 L to walk is now on room air without any medical interventions. Time spent with patient 29 minutes  - Tobacco use: The patient is not currently smoking.      - RTC 6 months w/ MD. Call or RTC sooner if symptoms persist or worsen acutely.

## 2021-11-02 ENCOUNTER — TELEPHONE (OUTPATIENT)
Dept: PULMONOLOGY | Age: 86
End: 2021-11-02

## 2021-11-02 NOTE — TELEPHONE ENCOUNTER
julio from [facility] called stating patient is moving to pallative care. She asking for order for oxygen be sent to her to show proof of oxygen needed for patient.   Order faxed   Fax # [phone]

## 2021-11-10 ENCOUNTER — TELEPHONE (OUTPATIENT)
Dept: PULMONOLOGY | Age: 86
End: 2021-11-10

## 2021-11-10 DIAGNOSIS — J84.112 IPF (IDIOPATHIC PULMONARY FIBROSIS) (HCC): Primary | ICD-10-CM

## 2021-11-10 DIAGNOSIS — J96.11 CHRONIC RESPIRATORY FAILURE WITH HYPOXIA (HCC): ICD-10-CM

## 2021-11-10 NOTE — TELEPHONE ENCOUNTER
Order placed. Diagnosis Orders   1. IPF (idiopathic pulmonary fibrosis) (HCC)  Pulse oximetry, overnight   2.  Chronic respiratory failure with hypoxia (HCC)  Pulse oximetry, overnight

## 2021-11-10 NOTE — TELEPHONE ENCOUNTER
julio calls again today needing 6 min walk, OV notes and order for o2  She is working with Beau Islas and his  on getting him oxygen with a independent company where he will be paying cost out of pocket  I will fax over what is needed [phone]

## 2021-11-10 NOTE — TELEPHONE ENCOUNTER
Will you please write a order in Epic for a overnight pulse ox test? Thank you      Pt's is no longer with [address] [place] [address]. He has TOD but can not use a CPAP so he would like to have O2 at night.

## 2022-01-11 ENCOUNTER — HOSPITAL ENCOUNTER (INPATIENT)
Age: 87
LOS: 1 days | Discharge: HOME | DRG: 392 | End: 2022-01-11
Attending: EMERGENCY MEDICINE | Admitting: INTERNAL MEDICINE
Payer: MEDICARE

## 2022-01-11 ENCOUNTER — APPOINTMENT (OUTPATIENT)
Dept: CT IMAGING | Age: 87
DRG: 392 | End: 2022-01-11
Payer: MEDICARE

## 2022-01-11 VITALS
RESPIRATION RATE: 16 BRPM | BODY MASS INDEX: 25.18 KG/M2 | TEMPERATURE: 97.7 F | WEIGHT: 190 LBS | HEART RATE: 70 BPM | HEIGHT: 73 IN | DIASTOLIC BLOOD PRESSURE: 80 MMHG | OXYGEN SATURATION: 96 % | SYSTOLIC BLOOD PRESSURE: 126 MMHG

## 2022-01-11 DIAGNOSIS — R10.84 GENERALIZED ABDOMINAL PAIN: Primary | ICD-10-CM

## 2022-01-11 DIAGNOSIS — N28.89 MASS OF RIGHT KIDNEY: ICD-10-CM

## 2022-01-11 PROBLEM — K52.9 ACUTE GASTROENTERITIS: Status: ACTIVE | Noted: 2022-01-11

## 2022-01-11 LAB
ALBUMIN SERPL-MCNC: 4.5 G/DL (ref 3.4–5)
ALP BLD-CCNC: 52 U/L (ref 40–129)
ALT SERPL-CCNC: 18 U/L (ref 10–40)
ANION GAP SERPL CALCULATED.3IONS-SCNC: 10 MMOL/L (ref 3–16)
AST SERPL-CCNC: 33 U/L (ref 15–37)
BASE EXCESS VENOUS: 4.9 MMOL/L (ref -2–3)
BASOPHILS ABSOLUTE: 0.1 K/UL (ref 0–0.2)
BASOPHILS RELATIVE PERCENT: 0.4 %
BILIRUB SERPL-MCNC: 0.4 MG/DL (ref 0–1)
BILIRUBIN DIRECT: <0.2 MG/DL (ref 0–0.3)
BILIRUBIN URINE: NEGATIVE
BILIRUBIN, INDIRECT: ABNORMAL MG/DL (ref 0–1)
BLOOD, URINE: NEGATIVE
BUN BLDV-MCNC: 41 MG/DL (ref 7–20)
CALCIUM SERPL-MCNC: 10.8 MG/DL (ref 8.3–10.6)
CARBOXYHEMOGLOBIN: 0.9 % (ref 0–1.5)
CHLORIDE BLD-SCNC: 98 MMOL/L (ref 99–110)
CLARITY: CLEAR
CO2: 30 MMOL/L (ref 21–32)
COLOR: YELLOW
CREAT SERPL-MCNC: 2.8 MG/DL (ref 0.8–1.3)
EKG ATRIAL RATE: 61 BPM
EKG DIAGNOSIS: NORMAL
EKG P AXIS: 45 DEGREES
EKG P-R INTERVAL: 236 MS
EKG Q-T INTERVAL: 468 MS
EKG QRS DURATION: 140 MS
EKG QTC CALCULATION (BAZETT): 471 MS
EKG R AXIS: 110 DEGREES
EKG T AXIS: 39 DEGREES
EKG VENTRICULAR RATE: 61 BPM
EOSINOPHILS ABSOLUTE: 0.5 K/UL (ref 0–0.6)
EOSINOPHILS RELATIVE PERCENT: 3.7 %
GFR AFRICAN AMERICAN: 26
GFR NON-AFRICAN AMERICAN: 21
GLUCOSE BLD-MCNC: 119 MG/DL (ref 70–99)
GLUCOSE BLD-MCNC: 123 MG/DL (ref 70–99)
GLUCOSE URINE: NEGATIVE MG/DL
HCO3 VENOUS: 32.3 MMOL/L (ref 24–28)
HCT VFR BLD CALC: 48.3 % (ref 40.5–52.5)
HEMOGLOBIN, VEN, REDUCED: 31.7 %
HEMOGLOBIN: 15.8 G/DL (ref 13.5–17.5)
INR BLD: 3.29 (ref 0.88–1.12)
KETONES, URINE: ABNORMAL MG/DL
LACTIC ACID: 1.2 MMOL/L (ref 0.4–2)
LEUKOCYTE ESTERASE, URINE: NEGATIVE
LIPASE: 28 U/L (ref 13–60)
LYMPHOCYTES ABSOLUTE: 1 K/UL (ref 1–5.1)
LYMPHOCYTES RELATIVE PERCENT: 8.4 %
MCH RBC QN AUTO: 28 PG (ref 26–34)
MCHC RBC AUTO-ENTMCNC: 32.8 G/DL (ref 31–36)
MCV RBC AUTO: 85.5 FL (ref 80–100)
METHEMOGLOBIN VENOUS: 0.4 % (ref 0–1.5)
MICROSCOPIC EXAMINATION: YES
MONOCYTES ABSOLUTE: 0.6 K/UL (ref 0–1.3)
MONOCYTES RELATIVE PERCENT: 5.2 %
NEUTROPHILS ABSOLUTE: 10.1 K/UL (ref 1.7–7.7)
NEUTROPHILS RELATIVE PERCENT: 82.3 %
NITRITE, URINE: NEGATIVE
O2 SAT, VEN: 68 %
PCO2, VEN: 56 MMHG (ref 41–51)
PDW BLD-RTO: 15.1 % (ref 12.4–15.4)
PERFORMED ON: ABNORMAL
PH UA: 7 (ref 5–8)
PH VENOUS: 7.37 (ref 7.35–7.45)
PLATELET # BLD: 191 K/UL (ref 135–450)
PMV BLD AUTO: 7.8 FL (ref 5–10.5)
PO2, VEN: 36.6 MMHG (ref 25–40)
POTASSIUM REFLEX MAGNESIUM: 4.6 MMOL/L (ref 3.5–5.1)
PROTEIN UA: 100 MG/DL
PROTHROMBIN TIME: 39.1 SEC (ref 9.9–12.7)
RBC # BLD: 5.65 M/UL (ref 4.2–5.9)
RBC UA: NORMAL /HPF (ref 0–4)
SARS-COV-2: NOT DETECTED
SODIUM BLD-SCNC: 138 MMOL/L (ref 136–145)
SPECIFIC GRAVITY UA: 1.02 (ref 1–1.03)
TCO2 CALC VENOUS: 34 MMOL/L
TOTAL PROTEIN: 8.5 G/DL (ref 6.4–8.2)
URINE TYPE: ABNORMAL
UROBILINOGEN, URINE: 0.2 E.U./DL
WBC # BLD: 12.3 K/UL (ref 4–11)
WBC UA: NORMAL /HPF (ref 0–5)

## 2022-01-11 PROCEDURE — 2580000003 HC RX 258: Performed by: INTERNAL MEDICINE

## 2022-01-11 PROCEDURE — G0378 HOSPITAL OBSERVATION PER HR: HCPCS

## 2022-01-11 PROCEDURE — 85025 COMPLETE CBC W/AUTO DIFF WBC: CPT

## 2022-01-11 PROCEDURE — 36415 COLL VENOUS BLD VENIPUNCTURE: CPT

## 2022-01-11 PROCEDURE — 6370000000 HC RX 637 (ALT 250 FOR IP): Performed by: INTERNAL MEDICINE

## 2022-01-11 PROCEDURE — 2580000003 HC RX 258: Performed by: EMERGENCY MEDICINE

## 2022-01-11 PROCEDURE — U0005 INFEC AGEN DETEC AMPLI PROBE: HCPCS

## 2022-01-11 PROCEDURE — 80048 BASIC METABOLIC PNL TOTAL CA: CPT

## 2022-01-11 PROCEDURE — 96375 TX/PRO/DX INJ NEW DRUG ADDON: CPT

## 2022-01-11 PROCEDURE — 85610 PROTHROMBIN TIME: CPT

## 2022-01-11 PROCEDURE — 81001 URINALYSIS AUTO W/SCOPE: CPT

## 2022-01-11 PROCEDURE — U0003 INFECTIOUS AGENT DETECTION BY NUCLEIC ACID (DNA OR RNA); SEVERE ACUTE RESPIRATORY SYNDROME CORONAVIRUS 2 (SARS-COV-2) (CORONAVIRUS DISEASE [COVID-19]), AMPLIFIED PROBE TECHNIQUE, MAKING USE OF HIGH THROUGHPUT TECHNOLOGIES AS DESCRIBED BY CMS-2020-01-R: HCPCS

## 2022-01-11 PROCEDURE — C9113 INJ PANTOPRAZOLE SODIUM, VIA: HCPCS | Performed by: INTERNAL MEDICINE

## 2022-01-11 PROCEDURE — 83690 ASSAY OF LIPASE: CPT

## 2022-01-11 PROCEDURE — 1200000000 HC SEMI PRIVATE

## 2022-01-11 PROCEDURE — 6360000002 HC RX W HCPCS: Performed by: INTERNAL MEDICINE

## 2022-01-11 PROCEDURE — 93005 ELECTROCARDIOGRAM TRACING: CPT | Performed by: EMERGENCY MEDICINE

## 2022-01-11 PROCEDURE — 83605 ASSAY OF LACTIC ACID: CPT

## 2022-01-11 PROCEDURE — 82803 BLOOD GASES ANY COMBINATION: CPT

## 2022-01-11 PROCEDURE — 96374 THER/PROPH/DIAG INJ IV PUSH: CPT

## 2022-01-11 PROCEDURE — 99283 EMERGENCY DEPT VISIT LOW MDM: CPT

## 2022-01-11 PROCEDURE — 80076 HEPATIC FUNCTION PANEL: CPT

## 2022-01-11 PROCEDURE — 74176 CT ABD & PELVIS W/O CONTRAST: CPT

## 2022-01-11 PROCEDURE — 6360000002 HC RX W HCPCS: Performed by: EMERGENCY MEDICINE

## 2022-01-11 RX ORDER — LATANOPROST 50 UG/ML
1 SOLUTION/ DROPS OPHTHALMIC NIGHTLY
COMMUNITY

## 2022-01-11 RX ORDER — BISMUTH SUBSALICYLATE 262 MG/1
524 TABLET, CHEWABLE ORAL
Status: DISCONTINUED | OUTPATIENT
Start: 2022-01-11 | End: 2022-01-11 | Stop reason: HOSPADM

## 2022-01-11 RX ORDER — INSULIN LISPRO 100 [IU]/ML
0-12 INJECTION, SOLUTION INTRAVENOUS; SUBCUTANEOUS
Status: DISCONTINUED | OUTPATIENT
Start: 2022-01-11 | End: 2022-01-11 | Stop reason: HOSPADM

## 2022-01-11 RX ORDER — VITAMIN B COMPLEX
2000 TABLET ORAL DAILY
Status: DISCONTINUED | OUTPATIENT
Start: 2022-01-11 | End: 2022-01-11 | Stop reason: HOSPADM

## 2022-01-11 RX ORDER — AMLODIPINE BESYLATE 5 MG/1
5 TABLET ORAL DAILY
Status: DISCONTINUED | OUTPATIENT
Start: 2022-01-11 | End: 2022-01-11

## 2022-01-11 RX ORDER — CALCIUM CARBONATE/VITAMIN D3 250-3.125
2 TABLET ORAL DAILY
Status: DISCONTINUED | OUTPATIENT
Start: 2022-01-12 | End: 2022-01-11 | Stop reason: HOSPADM

## 2022-01-11 RX ORDER — ZOLPIDEM TARTRATE 5 MG/1
5 TABLET ORAL NIGHTLY PRN
Status: DISCONTINUED | OUTPATIENT
Start: 2022-01-11 | End: 2022-01-11 | Stop reason: HOSPADM

## 2022-01-11 RX ORDER — WARFARIN SODIUM 5 MG/1
5 TABLET ORAL DAILY
Status: DISCONTINUED | OUTPATIENT
Start: 2022-01-11 | End: 2022-01-11

## 2022-01-11 RX ORDER — VIT C/B6/B5/MAGNESIUM/HERB 173 50-5-6-5MG
500 CAPSULE ORAL DAILY
COMMUNITY

## 2022-01-11 RX ORDER — LANOLIN ALCOHOL/MO/W.PET/CERES
3 CREAM (GRAM) TOPICAL NIGHTLY PRN
Status: DISCONTINUED | OUTPATIENT
Start: 2022-01-11 | End: 2022-01-11 | Stop reason: HOSPADM

## 2022-01-11 RX ORDER — SODIUM CHLORIDE 0.9 % (FLUSH) 0.9 %
5-40 SYRINGE (ML) INJECTION EVERY 12 HOURS SCHEDULED
Status: DISCONTINUED | OUTPATIENT
Start: 2022-01-11 | End: 2022-01-11 | Stop reason: HOSPADM

## 2022-01-11 RX ORDER — SODIUM CHLORIDE 9 MG/ML
25 INJECTION, SOLUTION INTRAVENOUS PRN
Status: DISCONTINUED | OUTPATIENT
Start: 2022-01-11 | End: 2022-01-11 | Stop reason: HOSPADM

## 2022-01-11 RX ORDER — TEMAZEPAM 15 MG/1
15 CAPSULE ORAL NIGHTLY
COMMUNITY

## 2022-01-11 RX ORDER — MULTIVITAMIN WITH IRON
100 TABLET ORAL DAILY
Status: DISCONTINUED | OUTPATIENT
Start: 2022-01-12 | End: 2022-01-11 | Stop reason: HOSPADM

## 2022-01-11 RX ORDER — AMLODIPINE BESYLATE 5 MG/1
5 TABLET ORAL DAILY PRN
COMMUNITY

## 2022-01-11 RX ORDER — PANTOPRAZOLE SODIUM 40 MG/10ML
40 INJECTION, POWDER, LYOPHILIZED, FOR SOLUTION INTRAVENOUS 2 TIMES DAILY
Status: DISCONTINUED | OUTPATIENT
Start: 2022-01-11 | End: 2022-01-11 | Stop reason: HOSPADM

## 2022-01-11 RX ORDER — TAMSULOSIN HYDROCHLORIDE 0.4 MG/1
0.4 CAPSULE ORAL DAILY
Status: DISCONTINUED | OUTPATIENT
Start: 2022-01-11 | End: 2022-01-11 | Stop reason: HOSPADM

## 2022-01-11 RX ORDER — ACETAMINOPHEN 650 MG/1
650 SUPPOSITORY RECTAL EVERY 6 HOURS PRN
Status: DISCONTINUED | OUTPATIENT
Start: 2022-01-11 | End: 2022-01-11 | Stop reason: HOSPADM

## 2022-01-11 RX ORDER — WARFARIN SODIUM 5 MG/1
5 TABLET ORAL DAILY
Status: DISCONTINUED | OUTPATIENT
Start: 2022-01-12 | End: 2022-01-11 | Stop reason: HOSPADM

## 2022-01-11 RX ORDER — LEVOTHYROXINE SODIUM 112 UG/1
112 TABLET ORAL DAILY
Status: DISCONTINUED | OUTPATIENT
Start: 2022-01-12 | End: 2022-01-11 | Stop reason: HOSPADM

## 2022-01-11 RX ORDER — LATANOPROST 50 UG/ML
1 SOLUTION/ DROPS OPHTHALMIC NIGHTLY
Status: DISCONTINUED | OUTPATIENT
Start: 2022-01-11 | End: 2022-01-11 | Stop reason: HOSPADM

## 2022-01-11 RX ORDER — FOLIC ACID 1 MG/1
1 TABLET ORAL DAILY
COMMUNITY

## 2022-01-11 RX ORDER — SILDENAFIL CITRATE 20 MG/1
20 TABLET ORAL DAILY
COMMUNITY

## 2022-01-11 RX ORDER — SODIUM CHLORIDE 0.9 % (FLUSH) 0.9 %
5-40 SYRINGE (ML) INJECTION PRN
Status: DISCONTINUED | OUTPATIENT
Start: 2022-01-11 | End: 2022-01-11 | Stop reason: HOSPADM

## 2022-01-11 RX ORDER — FUROSEMIDE 40 MG/1
40 TABLET ORAL DAILY
COMMUNITY

## 2022-01-11 RX ORDER — MULTIVITAMIN WITH IRON
100 TABLET ORAL DAILY
COMMUNITY

## 2022-01-11 RX ORDER — FOLIC ACID 1 MG/1
1 TABLET ORAL DAILY
Status: DISCONTINUED | OUTPATIENT
Start: 2022-01-11 | End: 2022-01-11 | Stop reason: HOSPADM

## 2022-01-11 RX ORDER — INSULIN LISPRO 100 [IU]/ML
0-6 INJECTION, SOLUTION INTRAVENOUS; SUBCUTANEOUS NIGHTLY
Status: DISCONTINUED | OUTPATIENT
Start: 2022-01-11 | End: 2022-01-11 | Stop reason: HOSPADM

## 2022-01-11 RX ORDER — UBIDECARENONE 100 MG
100 CAPSULE ORAL DAILY
Status: DISCONTINUED | OUTPATIENT
Start: 2022-01-11 | End: 2022-01-11 | Stop reason: CLARIF

## 2022-01-11 RX ORDER — LANOLIN ALCOHOL/MO/W.PET/CERES
1000 CREAM (GRAM) TOPICAL DAILY
Status: DISCONTINUED | OUTPATIENT
Start: 2022-01-11 | End: 2022-01-11 | Stop reason: HOSPADM

## 2022-01-11 RX ORDER — CALCIUM CARBONATE 200(500)MG
1 TABLET,CHEWABLE ORAL 3 TIMES DAILY PRN
Status: DISCONTINUED | OUTPATIENT
Start: 2022-01-11 | End: 2022-01-11 | Stop reason: HOSPADM

## 2022-01-11 RX ORDER — POLYETHYLENE GLYCOL 3350 17 G/17G
17 POWDER, FOR SOLUTION ORAL DAILY PRN
Status: DISCONTINUED | OUTPATIENT
Start: 2022-01-11 | End: 2022-01-11 | Stop reason: HOSPADM

## 2022-01-11 RX ORDER — ATORVASTATIN CALCIUM 20 MG/1
10 TABLET, FILM COATED ORAL NIGHTLY
Status: DISCONTINUED | OUTPATIENT
Start: 2022-01-11 | End: 2022-01-11 | Stop reason: HOSPADM

## 2022-01-11 RX ORDER — ACETAMINOPHEN 325 MG/1
650 TABLET ORAL EVERY 6 HOURS PRN
Status: DISCONTINUED | OUTPATIENT
Start: 2022-01-11 | End: 2022-01-11 | Stop reason: HOSPADM

## 2022-01-11 RX ORDER — ACETAMINOPHEN/DIPHENHYDRAMINE 500MG-25MG
2 TABLET ORAL NIGHTLY
COMMUNITY

## 2022-01-11 RX ORDER — SODIUM CHLORIDE 9 MG/ML
1000 INJECTION, SOLUTION INTRAVENOUS CONTINUOUS
Status: DISCONTINUED | OUTPATIENT
Start: 2022-01-11 | End: 2022-01-11

## 2022-01-11 RX ORDER — UBIDECARENONE 100 MG
100 CAPSULE ORAL DAILY
COMMUNITY

## 2022-01-11 RX ORDER — FINASTERIDE 5 MG/1
5 TABLET, FILM COATED ORAL DAILY
Status: DISCONTINUED | OUTPATIENT
Start: 2022-01-11 | End: 2022-01-11 | Stop reason: HOSPADM

## 2022-01-11 RX ORDER — ONDANSETRON 4 MG/1
4 TABLET, FILM COATED ORAL EVERY 12 HOURS PRN
Qty: 14 TABLET | Refills: 0 | Status: SHIPPED | OUTPATIENT
Start: 2022-01-11 | End: 2022-01-31

## 2022-01-11 RX ORDER — METOCLOPRAMIDE HYDROCHLORIDE 5 MG/ML
5 INJECTION INTRAMUSCULAR; INTRAVENOUS ONCE
Status: COMPLETED | OUTPATIENT
Start: 2022-01-11 | End: 2022-01-11

## 2022-01-11 RX ORDER — IPRATROPIUM BROMIDE AND ALBUTEROL SULFATE 2.5; .5 MG/3ML; MG/3ML
1 SOLUTION RESPIRATORY (INHALATION) 4 TIMES DAILY
COMMUNITY

## 2022-01-11 RX ORDER — LEVOTHYROXINE SODIUM 112 UG/1
112 TABLET ORAL
COMMUNITY

## 2022-01-11 RX ORDER — TAMSULOSIN HYDROCHLORIDE 0.4 MG/1
0.4 CAPSULE ORAL DAILY
COMMUNITY

## 2022-01-11 RX ORDER — ONDANSETRON 2 MG/ML
4 INJECTION INTRAMUSCULAR; INTRAVENOUS EVERY 6 HOURS PRN
Status: DISCONTINUED | OUTPATIENT
Start: 2022-01-11 | End: 2022-01-11 | Stop reason: HOSPADM

## 2022-01-11 RX ORDER — LEVOTHYROXINE SODIUM 0.12 MG/1
125 TABLET ORAL DAILY
Status: DISCONTINUED | OUTPATIENT
Start: 2022-01-11 | End: 2022-01-11

## 2022-01-11 RX ORDER — ACETAMINOPHEN 500 MG
500 TABLET ORAL EVERY 6 HOURS PRN
Status: DISCONTINUED | OUTPATIENT
Start: 2022-01-11 | End: 2022-01-11 | Stop reason: SDUPTHER

## 2022-01-11 RX ORDER — MORPHINE SULFATE 4 MG/ML
4 INJECTION, SOLUTION INTRAMUSCULAR; INTRAVENOUS ONCE
Status: COMPLETED | OUTPATIENT
Start: 2022-01-11 | End: 2022-01-11

## 2022-01-11 RX ADMIN — ONDANSETRON 4 MG: 2 INJECTION INTRAMUSCULAR; INTRAVENOUS at 09:35

## 2022-01-11 RX ADMIN — PANTOPRAZOLE SODIUM 40 MG: 40 INJECTION, POWDER, FOR SOLUTION INTRAVENOUS at 12:22

## 2022-01-11 RX ADMIN — SODIUM CHLORIDE 1000 ML: 9 INJECTION, SOLUTION INTRAVENOUS at 09:37

## 2022-01-11 RX ADMIN — CYANOCOBALAMIN TAB 1000 MCG 1000 MCG: 1000 TAB at 14:57

## 2022-01-11 RX ADMIN — SODIUM CHLORIDE, PRESERVATIVE FREE 10 ML: 5 INJECTION INTRAVENOUS at 12:22

## 2022-01-11 RX ADMIN — TAMSULOSIN HYDROCHLORIDE 0.4 MG: 0.4 CAPSULE ORAL at 14:57

## 2022-01-11 RX ADMIN — MORPHINE SULFATE 4 MG: 4 INJECTION INTRAVENOUS at 05:52

## 2022-01-11 RX ADMIN — Medication 2000 UNITS: at 14:57

## 2022-01-11 RX ADMIN — METOCLOPRAMIDE HYDROCHLORIDE 5 MG: 5 INJECTION INTRAMUSCULAR; INTRAVENOUS at 05:54

## 2022-01-11 RX ADMIN — FOLIC ACID 1 MG: 1 TABLET ORAL at 14:57

## 2022-01-11 ASSESSMENT — PAIN DESCRIPTION - DESCRIPTORS: DESCRIPTORS: CONSTANT

## 2022-01-11 ASSESSMENT — PAIN DESCRIPTION - FREQUENCY: FREQUENCY: CONTINUOUS

## 2022-01-11 ASSESSMENT — PAIN DESCRIPTION - LOCATION: LOCATION: ABDOMEN

## 2022-01-11 ASSESSMENT — PAIN DESCRIPTION - PAIN TYPE: TYPE: ACUTE PAIN

## 2022-01-11 ASSESSMENT — PAIN SCALES - GENERAL
PAINLEVEL_OUTOF10: 5
PAINLEVEL_OUTOF10: 0

## 2022-01-11 NOTE — H&P
Hospital Medicine History & Physical      PCP: Love Primrose, MD    Date of Admission: 1/11/2022    Date of Service: Pt seen/examined on 01/11/22   and Admitted to Inpatient with expected LOS greater than two midnights due to medical therapy. Chief Complaint:  abd pain, N/V      History Of Present Illness:   80 y.o. male who presented to [facility] with today with a chief complaint of: Abdominal pain. The patient states that he was try to go to bed this evening when he had abdominal pain. He describes crampy abdominal pain with nausea. He states that he feels like he needs to vomit but has not vomited. He denies any known history of constipation and states that he has a bowel movement every day sometimes 2-3. He is still passing gas. He denies any fever, chills, chest pain, shortness of breath, dysuria, weakness, numbness, tingling, swelling to his extremities. Of note, patient had a visit in February 21 for what appears to be a similar episode of abdominal pain that was thought to be secondary to constipation/ileus. He does not remember this visit. He also has a history of UTIs requiring antibiotics.       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  Atrial fibrillation ([address] 2006    on event monitor    BPH (benign prostatic hyperplasia)     Gout     HTN (hypertension)     Hyperlipidemia     Hypothyroidism     Kidney stones     Pacemaker 2006       Past Surgical History:          Procedure Laterality Date    SKIN CANCER EXCISION      SKIN CANCER EXCISION  2012    melanoma in [address]  2007    L4-5    TIBIA FRACTURE SURGERY         Medications Prior to Admission:      Prior to Admission medications    Medication Sig Start Date End Date Taking?  Authorizing Provider   ondansetron (ZOFRAN) 4 MG tablet Take 1 tablet by mouth every 12 hours as needed for Nausea 1/11/22 1/31/22 Yes Rosita Stauffer MD   diphenhydrAMINE-APAP, sleep, (TYLENOL PM EXTRA STRENGTH)  MG tablet Take 2 tablets by mouth nightly   Yes Historical Provider, MD   folic acid (FOLVITE) 1 MG tablet Take 1 mg by mouth daily   Yes Historical Provider, MD   vitamin D (CHOLECALCIFEROL) 25 MCG (1000 UT) TABS tablet Take 2,000 Units by mouth daily   Yes Historical Provider, MD   coenzyme Q10 100 MG CAPS capsule Take 100 mg by mouth daily   Yes Historical Provider, MD   levothyroxine (SYNTHROID) 112 MCG tablet Take 112 mcg by mouth every morning (before breakfast)   Yes Historical Provider, MD   temazepam (RESTORIL) 15 MG capsule Take 15 mg by mouth nightly. Yes Historical Provider, MD   latanoprost (XALATAN) 0.005 % ophthalmic solution Place 1 drop into both eyes nightly   Yes Historical Provider, MD   tamsulosin (FLOMAX) 0.4 MG capsule Take 0.4 mg by mouth daily   Yes Historical Provider, MD   ipratropium-albuterol (DUONEB) 0.5-2.5 (3) MG/3ML SOLN nebulizer solution Inhale 1 vial into the lungs 4 times daily   Yes Historical Provider, MD   furosemide (LASIX) 40 MG tablet Take 40 mg by mouth daily   Yes Historical Provider, MD   turmeric 500 MG CAPS Take 500 mg by mouth daily   Yes Historical Provider, MD   vitamin B-6 (PYRIDOXINE) 100 MG tablet Take 100 mg by mouth daily   Yes Historical Provider, MD   sildenafil (REVATIO) 20 MG tablet Take 20 mg by mouth daily For dementia prevention   Yes Historical Provider, MD   calcium-vitamin D (OSCAL-500) 500-200 MG-UNIT per tablet Take 1 tablet by mouth daily   Yes Historical Provider, MD   vitamin B-12 (CYANOCOBALAMIN) 1000 MCG tablet Take 1,000 mcg by mouth daily   Yes Historical Provider, MD   testosterone enanthate (DELATESTRYL) 200 MG/ML injection Inject 100 mg into the muscle every 14 days.     Yes Historical Provider, MD   warfarin (COUMADIN) 5 MG tablet Take 5 mg by mouth nightly    Yes Martina Lino MD   simvastatin (ZOCOR) 20 MG tablet Take 10 mg by mouth nightly    Yes Martina Lino MD finasteride (PROSCAR) 5 MG tablet Take 5 mg by mouth daily  2/7/11  Yes Historical Provider, MD   amLODIPine (NORVASC) 5 MG tablet Take 5 mg by mouth daily as needed (BP > 140/90 at 10AM check)    Historical Provider, MD       Allergies:  Allopurinol, Penicillins, Ciprofloxacin, Iodides, Sulfa antibiotics, Sulfamethoxazole-trimethoprim, and Cephalosporins    Social History:      The patient currently lives at home    TOBACCO:   reports that he quit smoking about 59 years ago. His smoking use included cigarettes. He has a 10.00 pack-year smoking history. He has never used smokeless tobacco.  ETOH:   reports current alcohol use of about 5.0 standard drinks of alcohol per week. E-Cigarettes/Vaping Use     Questions Responses    E-Cigarette/Vaping Use Never User    Start Date     Passive Exposure     Quit Date     Counseling Given     Comments             Family History:       Reviewed in detail and negative for DM, CAD, Cancer, CVA. Positive as follows:        Problem Relation Age of Onset    Other Sister         Rheumatic fever       REVIEW OF SYSTEMS COMPLETED:   Pertinent positives as noted in the HPI. All other systems reviewed and negative. PHYSICAL EXAM PERFORMED:    /80   Pulse 63   Temp 97.6 °F (36.4 °C) (Oral)   Resp 19   Ht 6' 1\" (1.854 m)   Wt 190 lb (86.2 kg)   SpO2 95%   BMI 25.07 kg/m²     General appearance:  No apparent distress, appears stated age and cooperative. HEENT:  Normal cephalic, atraumatic without obvious deformity. Pupils equal, round, and reactive to light. Extra ocular muscles intact. Conjunctivae/corneas clear. Neck: Supple, with full range of motion. No jugular venous distention. Trachea midline. Respiratory:  Normal respiratory effort. Clear to auscultation, bilaterally without Rales/Wheezes/Rhonchi. Cardiovascular:  Regular rate and rhythm with normal S1/S2 without murmurs, rubs or gallops.   Abdomen: tense, non-tender, non-distended with normal bowel sounds. Musculoskeletal:  No clubbing, cyanosis or edema bilaterally. Full range of motion without deformity. Skin: Skin color, texture, turgor normal.  No rashes or lesions. Neurologic:  Neurovascularly intact without any focal sensory/motor deficits. Cranial nerves: II-XII intact, grossly non-focal.  Psychiatric:  Alert and oriented, thought content appropriate, normal insight  Capillary Refill: Brisk,3 seconds, normal  Peripheral Pulses: +2 palpable, equal bilaterally       Labs:     Recent Labs     01/11/22  0525   WBC 12.3*   HGB 15.8   HCT 48.3        Recent Labs     01/11/22  0524      K 4.6   CL 98*   CO2 30   BUN 41*   CREATININE 2.8*   CALCIUM 10.8*     Recent Labs     01/11/22  0624   AST 33   ALT 18   BILIDIR <0.2   BILITOT 0.4   ALKPHOS 52     No results for input(s): INR in the last 72 hours. No results for input(s): Gerardo Arianneer in the last 72 hours. Urinalysis:      Lab Results   Component Value Date    NITRU Negative 01/11/2022    WBCUA 0-2 01/11/2022    BACTERIA RARE 10/15/2020    RBCUA 0-2 01/11/2022    BLOODU Negative 01/11/2022    SPECGRAV 1.020 01/11/2022    GLUCOSEU Negative 01/11/2022       Radiology:     CXR: I have reviewed the CXR with the following interpretation: none  EKG:  I have reviewed the EKG with the following interpretation: none    CT ABDOMEN PELVIS WO CONTRAST Additional Contrast? None   Final Result   1. Nonobstructing bilateral nephrolithiasis. 2. 1.3 cm exophytic renal lesion on the right with Hounsfield units greater than that of a simple cyst. MRI of the abdomen without and with contrast is recommended, renal mass protocol. 3. Mild fluid-filled small bowel loops without wall thickening or significant distention. This is nonspecific. There is no transition point. Bowel loops measure up to 2.9 cm which is within normal limits.           ASSESSMENT:    Active Hospital Problems    Diagnosis Date Noted    Acute gastroenteritis [K52.9] 01/11/2022 PLAN:    Acute abd pain, N/V suspect acute gastroenteritis VS SBO/Ileus/Olgilvie syndrome  - patient CT abd was neg for SBO, but abd exam does feel pretty distended and hard, bowel sound is kind of hypoactive  - consulted surgery  - IV PPI BID  - peptobismol  - IVF  - IV zofran PRN  - liquid diet for now, advance as tolerated    A-fib s/p pacemaker  - coumadin pharmacy to dose    HTN  - amlodipine PRN at home  - will hold    Gout  - febuxostat PRN at home will hold for now    BPH  - finesteride    Hypothyroidism  - synthroid    ERIC on CKD  Renal mass  - known to his nephrologist  - consulted nephrology  - holding lasix    IPF  - sees dr. Bola Merchant  - enrolled in hospice  - not interested in getting pirfenidone or Esbriet       DVT Prophylaxis: warfarin  Diet: ADULT DIET; Full Liquid  Code Status: Full Code    PT/OT Eval Status: pending    Dispo - wards       Aron Le DO    Thank you Gerardo Simmons MD for the opportunity to be involved in this patient's care. If you have any questions or concerns please feel free to contact me at [phone].

## 2022-01-11 NOTE — CONSULTS
Department of General Surgery - Adult  Surgical Service   Consult Note      CHIEF COMPLAINT:  Abdominal pain    History Obtained From:  patient, electronic medical record    HISTORY OF PRESENT ILLNESS:                The patient is a 80 y.o. male who presents with pain and distension since last evening. Patient states he ate dinner earlier in the evening. At around 11 when he laid down, he noticed his abdomen was very distended and he started having pain. Denies nausea or vomiting at that time. He is unsure when he last passed flatus however he did have multiple bowel movements yesterday which were normal character for him. He takes miralax daily and has 3-4 BMs a day. He has never experience pain like this in the past. He denies any abdominal surgeries. Denies fevers, chills, sick contacts, recent illness, or urinary symptoms. His last colonoscopy was years ago. He has a pacemaker in place and is on Warfarin which he last took yesterday evening. Of note, he had an obstruction kidney stone about three months ago which he states he had to have three cystos and stent placements. He states it was complicated by urosepsis. Past Medical History:        Diagnosis Date    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      Atrial fibrillation ([address] 2006    on event monitor    BPH (benign prostatic hyperplasia)     Gout     HTN (hypertension)     Hyperlipidemia     Hypothyroidism     Kidney stones     Pacemaker 2006     Past Surgical History:        Procedure Laterality Date    SKIN CANCER EXCISION      SKIN CANCER EXCISION  2012    melanoma in situ [address]  2007    L4-5    TIBIA FRACTURE SURGERY       Current Medications:   Current Facility-Administered Medications: aluminum & magnesium hydroxide-simethicone (MAALOX) 30 mL, lidocaine viscous hcl (XYLOCAINE) 5 mL (GI COCKTAIL), , Oral, Once  ondansetron (ZOFRAN) injection 4 mg, 4 mg, IntraVENous, Q6H PRN  0.9 % sodium chloride systems was conducted, significant findings as noted in HPI. All other systems negative. Constitutional: Negative for chills and fever. HENT: Negative for congestion, facial swelling, and voice change. Eyes: Negative for photophobia and visual disturbance. Respiratory: Negative for apnea, cough, chest tightness and shortness of breath. Cardiovascular: Negative for chest pain and palpitations. Gastrointestinal: Negative for dysphagia and early satiety. Genitourinary: Negative for difficulty urinating, dysuria, flank pain, frequency and hematuria. Musculoskeletal: Negative for new gait problem, joint swelling and myalgias. Skin: Negative for color change, pallor and rash. Endocrine: negative for tremors, temperature intolerance or polydipsia. Allergic/Immunologic: Negative for new environmental or food allergies. Neurological: Negative for dizziness, seizures, speech difficulty, numbness. Hematological: Negative for adenopathy. Psychiatric/Behavioral: Negative for agitation and confusion.     Physical exam:    General appearance: alert, resting in bed  Neuro: A&Ox3, no focal deficits  HENT: trachea midline, no JVD, no LAD  Eyes: PERRLA, no scleral icterus  Chest/Lungs: CTAB, no crackles/rales, wheezes/rhonchi   Cardiovascular: RRR, no murmurs/gallops/rubs  Abdomen: soft, non-tender, non-distended, no guarding/rigidity  Skin: warm and dry  Extremities: no edema , no cyanosis    DATA:    CBC with Differential:    Lab Results   Component Value Date    WBC 12.3 01/11/2022    RBC 5.65 01/11/2022    HGB 15.8 01/11/2022    HCT 48.3 01/11/2022     01/11/2022    MCV 85.5 01/11/2022    MCH 28.0 01/11/2022    MCHC 32.8 01/11/2022    RDW 15.1 01/11/2022    SEGSPCT 68.2 06/21/2011    LYMPHOPCT 8.4 01/11/2022    MONOPCT 5.2 01/11/2022    EOSPCT 2.7 06/21/2011    BASOPCT 0.4 01/11/2022    MONOSABS 0.6 01/11/2022    LYMPHSABS 1.0 01/11/2022    EOSABS 0.5 01/11/2022    BASOSABS 0.1 01/11/2022 BMP:    Lab Results   Component Value Date     01/11/2022    K 4.6 01/11/2022    CL 98 01/11/2022    CO2 30 01/11/2022    BUN 41 01/11/2022    LABALBU 4.5 01/11/2022    CREATININE 2.8 01/11/2022    CALCIUM 10.8 01/11/2022    GFRAA 26 01/11/2022    GFRAA 47 02/26/2013    LABGLOM 21 01/11/2022    GLUCOSE 123 01/11/2022    GLUCOSE 86 06/21/2011     Hepatic Function Panel:    Lab Results   Component Value Date    ALKPHOS 52 01/11/2022    ALT 18 01/11/2022    AST 33 01/11/2022    PROT 8.5 01/11/2022    PROT 7.0 01/23/2013    BILITOT 0.4 01/11/2022    BILIDIR <0.2 01/11/2022    IBILI see below 01/11/2022    LABALBU 4.5 01/11/2022     Albumin:    Lab Results   Component Value Date    LABALBU 4.5 01/11/2022     Magnesium:  No results found for: MG  Phosphorus:    Lab Results   Component Value Date    PHOS 2.6 10/15/2020     PT/INR:    Lab Results   Component Value Date    PROTIME 28.9 02/28/2021    INR 2.47 02/28/2021       CT abd/pelvis 1/11/22:    1.  Nonobstructing bilateral nephrolithiasis. 2. 1.3 cm exophytic renal lesion on the right with Hounsfield units greater than that of a simple cyst. MRI of the abdomen without and with contrast is recommended, renal mass protocol. 3. Mild fluid-filled small bowel loops without wall thickening or significant distention. This is nonspecific. There is no transition point. Bowel loops measure up to 2.9 cm which is within normal limits. IMPRESSION/RECOMMENDATIONS:      60-year-old male who presents with abdominal pain, distension, N/V most consistent with an ileus. CT scan completed showing mildly distended small bowel, no bowel wall thickening, free fluid, or pneumoperitoneum. There is no transition point noted. Patient is HDS. Labs within normal limits. He has a benign physical exam and is endorsing hunger. Patient is being admitted to the medical service for ERIC on CKD. Will continue to follow. [id number] [address] for a liquid diet and can be advanced as tolerates. Patient discussed with Dr. Frank Manzano.     Raul Lawson, SADIQ - CNP   11:19 AM 1/11/2022   [phone]

## 2022-01-11 NOTE — ED PROVIDER NOTES
[address] [facility] [address] ENCOUNTER          ATTENDING PHYSICIAN NOTE       Date of evaluation: 1/11/2022    Chief Complaint     Abdominal Pain      History of Present Illness     Lee Cano is a 80 y.o. male with a PMH as described below who presents to the ED today with a chief complaint of: Abdominal pain. The patient states that he was try to go to bed this evening when he had abdominal pain. He describes crampy abdominal pain with nausea. He states that he feels like he needs to vomit but has not vomited. He denies any known history of constipation and states that he has a bowel movement every day sometimes 2-3. He is still passing gas. He denies any fever, chills, chest pain, shortness of breath, dysuria, weakness, numbness, tingling, swelling to his extremities. Of note, patient had a visit in February 21 for what appears to be a similar episode of abdominal pain that was thought to be secondary to constipation/ileus. He does not remember this visit. He also has a history of UTIs requiring antibiotics. The patientstates that there were no other associated signs and symptoms or modifying factors. Patient rates pain as 6/10. Review of Systems     As described above in the HPI otherwise all the systems reviewed and negative as reported by the patient. Past Medical, Surgical, Family, and Social History     He has a past medical history of Allergy to sunlight, Atrial fibrillation ([address] BPH (benign prostatic hyperplasia), Gout, HTN (hypertension), Hyperlipidemia, Hypothyroidism, Kidney stones, and Pacemaker. He has a past surgical history that includes Spine surgery (2007); Skin cancer excision; Tibia fracture surgery; and Skin cancer excision (2012). His family history includes Other in his sister. He reports that he quit smoking about 59 years ago. His smoking use included cigarettes. He has a 10.00 pack-year smoking history.  He has never used smokeless tobacco. He reports current alcohol use of about 5.0 standard drinks of alcohol per week. He reports that he does not use drugs. Medications     Previous Medications    ACETAMINOPHEN (TYLENOL) 500 MG TABLET    Take 500 mg by mouth nightly as needed for Pain    AMLODIPINE (NORVASC) 5 MG TABLET    Take 5 mg by mouth daily    CALCIUM CARBONATE (TUMS) 500 MG CHEWABLE TABLET    Take 1 tablet by mouth 3 times daily as needed for Heartburn    CALCIUM-VITAMIN D (OSCAL-500) 500-200 MG-UNIT PER TABLET    Take 1 tablet by mouth daily    CHOLECALCIFEROL 400 UNIT TABS TABLET    Take 400 Units by mouth daily    COENZYME Q10 (EQL COQ10) 300 MG CAPS    Take 300 mg by mouth daily    FEBUXOSTAT 80 MG TABS    Take 80 mg by mouth daily as needed    FINASTERIDE (PROSCAR) 5 MG TABLET    Take 5 mg by mouth daily     FISH OIL    Take 500 mg by mouth daily     GLUCOSE BLOOD VI TEST STRIPS (FREESTYLE LITE) STRIP    Test once daily    GLUTATHIONE 50 MG TABS    Take by mouth    LEVOTHYROXINE (SYNTHROID) 100 MCG TABLET    Take 1 tablet by mouth daily. MELATONIN 3 MG TABS TABLET    Take 3 mg by mouth nightly as needed    MOMETASONE (NASONEX) 50 MCG/ACT NASAL SPRAY    2 sprays by Nasal route daily. MULTIPLE VITAMIN (MULTIVITAMIN PO)    Take 1 tablet by mouth     MUPIROCIN (BACTROBAN) 2 % OINTMENT    Apply daily prn intranasal    SIMVASTATIN (ZOCOR) 10 MG TABLET    Take 1 tablet by mouth nightly. SUVOREXANT (BELSOMRA) 10 MG TABS    Take 10 mg by mouth nightly as needed.     TEMAZEPAM (RESTORIL) 15 MG CAPSULE    TAKE ONE CAPSULE BY MOUTH EVERY NIGHT AT BEDTIME AS NEEDED FOR SLEEP    TESTOSTERONE ENANTHATE (DELATESTRYL) 200 MG/ML INJECTION    USE AS DIRECTED, INJECTING EVERY 2 WEEKS    VITAMIN B-12 (CYANOCOBALAMIN) 1000 MCG TABLET    Take 1,000 mcg by mouth daily    WARFARIN (COUMADIN) 5 MG TABLET    5 mg every day       Allergies     He is allergic to allopurinol, penicillins, ciprofloxacin, iodides, sulfa antibiotics, sulfamethoxazole-trimethoprim, Lymphocytes Absolute 1.0 1.0 - 5.1 K/uL    Monocytes Absolute 0.6 0.0 - 1.3 K/uL    Eosinophils Absolute 0.5 0.0 - 0.6 K/uL    Basophils Absolute 0.1 0.0 - 0.2 K/uL   Basic Metabolic Panel w/ Reflex to MG   Result Value Ref Range    Sodium 138 136 - 145 mmol/L    Potassium reflex Magnesium 4.6 3.5 - 5.1 mmol/L    Chloride 98 (L) 99 - 110 mmol/L    CO2 30 21 - 32 mmol/L    Anion Gap 10 3 - 16    Glucose 123 (H) 70 - 99 mg/dL    BUN 41 (H) 7 - 20 mg/dL    CREATININE 2.8 (H) 0.8 - 1.3 mg/dL    GFR Non-African American 21 (A) >60    GFR  26 (A) >60    Calcium 10.8 (H) 8.3 - 10.6 mg/dL   Blood gas, venous (Lab)   Result Value Ref Range    pH, Yordy 7.369 7.350 - 7.450    pCO2, Yordy 56.0 (H) 41.0 - 51.0 mmHg    pO2, Yordy 36.6 25.0 - 40.0 mmHg    HCO3, Venous 32.3 (H) 24.0 - 28.0 mmol/L    Base Excess, Yordy 4.9 (H) -2.0 - 3.0 mmol/L    O2 Sat, Yordy 68 Not established %    Carboxyhemoglobin 0.9 0.0 - 1.5 %    MetHgb, Yordy 0.4 0.0 - 1.5 %    TC02 (Calc), Yordy 34 mmol/L    Hemoglobin, Yordy, Reduced 31.70 %   Lactic acid, plasma   Result Value Ref Range    Lactic Acid 1.2 0.4 - 2.0 mmol/L       ED BEDSIDE ULTRASOUND:      RECENT VITALS:  BP: (!) 153/91, Temp: 97.7 °F (36.5 °C),Pulse: 65, Resp: 18, SpO2: 97 %     Procedures         ED Course     Nursing Notes, Past Medical Hx, Past Surgical Hx, Social Hx, Allergies, and Family Hx werereviewed. The patient was given thefollowing medications:  Orders Placed This Encounter   Medications    aluminum & magnesium hydroxide-simethicone (MAALOX) 30 mL, lidocaine viscous hcl (XYLOCAINE) 5 mL (GI COCKTAIL)    morphine injection 4 mg    metoclopramide (REGLAN) injection 5 mg       CONSULTS:  None    MEDICAL DECISION MAKING / ASSESSMENT / PLAN     Josh Pardo is a 80 y.o. male who presents with abdominal pain that started this evening.   On my evaluation the patient does have a mildly distended abdomen that is generally tender but he does not have any rebound tenderness or voluntary guarding. Patient laboratory studies were performed. He was offered pain medication but declined. He has a slight white count elevation that I believe to be nonspecific. He has known CKD and his creatinine is near his baseline at 2.8. At time of attending turnover, CT scan of his abdomen pelvis laboratory studies are pending. Clinically, his symptoms are suspicious for constipation versus ileus. He was given a GI cocktail and Reglan. Please see addendum dictation for ultimate disposition and plan        Clinical Impression     1. Generalized abdominal pain        Disposition     PATIENT REFERRED TO:  No follow-up provider specified.     DISCHARGE MEDICATIONS:  New Prescriptions    No medications on file       Twan Calix MD  01/11/22 [id number]

## 2022-01-11 NOTE — Clinical Note
Patient Class: Inpatient [101]   REQUIRED: Diagnosis: Acute gastroenteritis [899659]   Estimated Length of Stay: Estimated stay of more than 2 midnights   Admitting Provider: Harshil Miller [[id number]]   Telemetry/Cardiac Monitoring Required?: Yes

## 2022-01-11 NOTE — ED NOTES
Bed:   Expected date:   Expected time:   Means of arrival:   Comments:  Next Patient      Opal Rachel RN  01/11/22 [id number]

## 2022-01-11 NOTE — ED NOTES
Clinical Pharmacy Progress Note  Medication History     Admit Date: 1/11/2022    List of of current medications patient is taking is complete. Home Medication list in EPIC updated to reflect changes noted below. Source of information: patient interview, pharmacy fills    [facility]     Changes made to medication list:   Medications removed:   APAP   Tums prn   Febuxostat 80mg daily prn   Fish oil   Glutathione   Melatonin   Nasonex nasal prn  Medications added:    Amlodipine PRN -- pt only takes if BP > 140/90 at 10am check, hasn't had a dose in a long while   Sildenafil -- for dementia prevention   Tylenol PM   Folic acid   Latanoprost opth   Tamsulosin   Duonebs QID   Furosemide   Folic acid   Tumeric   Vitamin B6  Medication doses adjusted:     Vitamin D3 -- changed from 400 to 2000 units daily   Levothyroxine -- changed from 100mcg to 112 mcg daily   Warfarin -- changed to 5mg QHS, last dose yesterday  Other notes:    PS sent to Dr Bradley Santiago to notify of the changes. Please call with any questions.   Vicki GonzalezD., BCPS   1/11/2022 1:03 PM  Wireless: [phone]

## 2022-01-11 NOTE — ED NOTES
Bed: C2-  Expected date:   Expected time:   Means of arrival:   Comments:  Hold for [phone] Robin Golden RN  01/11/22 0366

## 2022-01-11 NOTE — FLOWSHEET NOTE
Tolerated solid food- denies any pain or nausea. States feels fine, no complaints. PIV removed, pt discharged with all belongings per w/c to private car to home.

## 2022-01-11 NOTE — DISCHARGE INSTR - DIET

## 2022-01-11 NOTE — DISCHARGE SUMMARY
Hospital Medicine Discharge Summary    Patient ID: Oscar Meyer      Patient's PCP: Love Primrose, MD    Admit Date: 1/11/2022     Discharge Date:   01/11/22    Admitting Provider: Gilles Lozada DO     Discharge Provider: Gilles Lozada DO     Discharge Diagnoses: Active Hospital Problems    Diagnosis     Acute gastroenteritis [K52.9]        The patient was seen and examined on day of discharge and this discharge summary is in conjunction with any daily progress note from day of discharge.     Hospital Course: 80 y.o. male who presented to [facility] with today with a chief complaint of: Abdominal pain.  The patient states that he was try to go to bed this evening when he had abdominal pain.  He describes crampy abdominal pain with nausea.  He states that he feels like he needs to vomit but has not vomited. Olegario Bustillos denies any known history of constipation and states that he has a bowel movement every day sometimes 2-3. Olegario Bustillos is still passing gas.  He denies any fever, chills, chest pain, shortness of breath, dysuria, weakness, numbness, tingling, swelling to his extremities.  Of note, patient had a visit in February 21 for what appears to be a similar episode of abdominal pain that was thought to be secondary to constipation/ileus.  He does not remember this visit. Olegario Bustillos also has a history of UTIs requiring antibiotics    Acute abd pain, N/V suspect acute gastroenteritis - patient CT abd was neg for SBO, but abd exam does feel pretty distended and hard, bowel sound is kind of hypoactive  - consulted surgery exam benign  - IV PPI BID  - peptobismol  - IVF  - IV zofran PRN  - liquid diet for now, advance as tolerated  - patient tolerating diet well can discharge home today     A-fib s/p pacemaker  - coumadin pharmacy to dose     HTN  - amlodipine PRN at home  - will hold     Gout  - febuxostat PRN at home will hold for now     BPH  - finesteride     Hypothyroidism  - synthroid     ERIC on CKD  Renal mass  - known to his nephrologist  - consulted nephrology  - holding lasix     IPF  - sees dr. Bea Galarza  - enrolled in hospice  - not interested in getting pirfenidone or Esbriet         DVT Prophylaxis: warfarin  Diet: ADULT DIET; Full Liquid  Code Status: Full Code     PT/OT Eval Status: pending          Physical Exam Performed:     /80   Pulse 63   Temp 97.6 °F (36.4 °C) (Oral)   Resp 19   Ht 6' 1\" (1.854 m)   Wt 190 lb (86.2 kg)   SpO2 95%   BMI 25.07 kg/m²       General appearance:  No apparent distress, appears stated age and cooperative. HEENT:  Normal cephalic, atraumatic without obvious deformity. Pupils equal, round, and reactive to light. Extra ocular muscles intact. Conjunctivae/corneas clear. Neck: Supple, with full range of motion. No jugular venous distention. Trachea midline. Respiratory:  Normal respiratory effort. Clear to auscultation, bilaterally without Rales/Wheezes/Rhonchi. Cardiovascular:  Regular rate and rhythm with normal S1/S2 without murmurs, rubs or gallops. Abdomen: Soft, non-tender, non-distended with normal bowel sounds. Musculoskeletal:  No clubbing, cyanosis or edema bilaterally. Full range of motion without deformity. Skin: Skin color, texture, turgor normal.  No rashes or lesions. Neurologic:  Neurovascularly intact without any focal sensory/motor deficits. Cranial nerves: II-XII intact, grossly non-focal.  Psychiatric:  Alert and oriented, thought content appropriate, normal insight  Capillary Refill: Brisk,< 3 seconds   Peripheral Pulses: +2 palpable, equal bilaterally       Labs:  For convenience and continuity at follow-up the following most recent labs are provided:      CBC:    Lab Results   Component Value Date    WBC 12.3 01/11/2022    HGB 15.8 01/11/2022    HCT 48.3 01/11/2022     01/11/2022       Renal:    Lab Results   Component Value Date     01/11/2022    K 4.6 01/11/2022    CL 98 01/11/2022    CO2 30 01/11/2022    BUN 41 01/11/2022 CREATININE 2.8 01/11/2022    CALCIUM 10.8 01/11/2022    PHOS 2.6 10/15/2020         Significant Diagnostic Studies    Radiology:   CT ABDOMEN PELVIS WO CONTRAST Additional Contrast? None   Final Result   1. Nonobstructing bilateral nephrolithiasis. 2. 1.3 cm exophytic renal lesion on the right with Hounsfield units greater than that of a simple cyst. MRI of the abdomen without and with contrast is recommended, renal mass protocol. 3. Mild fluid-filled small bowel loops without wall thickening or significant distention. This is nonspecific. There is no transition point. Bowel loops measure up to 2.9 cm which is within normal limits. Consults:     IP CONSULT TO HOSPITALIST  IP CONSULT TO NEPHROLOGY  IP CONSULT TO DIETITIAN  IP CONSULT TO PHARMACY  IP CONSULT TO GENERAL SURGERY    Disposition:  home     Condition at Discharge: Stable    Discharge Instructions/Follow-up:  pcp    Code Status:  Full Code     Activity: activity as tolerated    Diet: regular diet      Discharge Medications:     Current Discharge Medication List           Details   ondansetron (ZOFRAN) 4 MG tablet Take 1 tablet by mouth every 12 hours as needed for Nausea  Qty: 14 tablet, Refills: 0              Details   diphenhydrAMINE-APAP, sleep, (TYLENOL PM EXTRA STRENGTH)  MG tablet Take 2 tablets by mouth nightly      folic acid (FOLVITE) 1 MG tablet Take 1 mg by mouth daily      vitamin D (CHOLECALCIFEROL) 25 MCG (1000 UT) TABS tablet Take 2,000 Units by mouth daily      coenzyme Q10 100 MG CAPS capsule Take 100 mg by mouth daily      levothyroxine (SYNTHROID) 112 MCG tablet Take 112 mcg by mouth every morning (before breakfast)      temazepam (RESTORIL) 15 MG capsule Take 15 mg by mouth nightly.       latanoprost (XALATAN) 0.005 % ophthalmic solution Place 1 drop into both eyes nightly      tamsulosin (FLOMAX) 0.4 MG capsule Take 0.4 mg by mouth daily      ipratropium-albuterol (DUONEB) 0.5-2.5 (3) MG/3ML SOLN nebulizer solution Inhale 1 vial into the lungs 4 times daily      furosemide (LASIX) 40 MG tablet Take 40 mg by mouth daily      turmeric 500 MG CAPS Take 500 mg by mouth daily      vitamin B-6 (PYRIDOXINE) 100 MG tablet Take 100 mg by mouth daily      sildenafil (REVATIO) 20 MG tablet Take 20 mg by mouth daily For dementia prevention      calcium-vitamin D (OSCAL-500) 500-200 MG-UNIT per tablet Take 1 tablet by mouth daily      vitamin B-12 (CYANOCOBALAMIN) 1000 MCG tablet Take 1,000 mcg by mouth daily      testosterone enanthate (DELATESTRYL) 200 MG/ML injection Inject 100 mg into the muscle every 14 days. warfarin (COUMADIN) 5 MG tablet Take 5 mg by mouth nightly   Qty: 30 tablet      simvastatin (ZOCOR) 20 MG tablet Take 10 mg by mouth nightly   Qty: 30 tablet      finasteride (PROSCAR) 5 MG tablet Take 5 mg by mouth daily       amLODIPine (NORVASC) 5 MG tablet Take 5 mg by mouth daily as needed (BP > 140/90 at 10AM check)             Time Spent on discharge is more than 30 minutes in the examination, evaluation, counseling and review of medications and discharge plan. Signed: Jefferson Anton DO   1/11/2022      Thank you Debbie Castillo MD for the opportunity to be involved in this patient's care. If you have any questions or concerns please feel free to contact me at [phone].

## 2022-01-11 NOTE — ED NOTES
This RN ambulated with pt in Onset, pt gait steady and able to ambulate independently      Nestor Moss RN  01/11/22 1179

## 2022-01-11 NOTE — ED NOTES
This RN was escorting pt to lobby to meet family member at time of discharge when pt began vomiting.  MD aware, pt returned to room for further work up      [facility], [address] [facility]  01/11/22 0805

## 2022-01-11 NOTE — ED PROVIDER NOTES
[address] [facility] ENCOUNTER          ATTENDING PHYSICIAN NOTE       Date of evaluation: 1/11/2022    ADDENDUM:      Care of this patient was assumed from Dr. Anthony Cummings. The patient was seen for Abdominal Pain  . The patient's initial evaluation and plan have been discussed with the prior provider who initially evaluated the patient. Nursing Notes, Past Medical Hx, Past Surgical Hx, Social Hx, Allergies, and Family Hx were all reviewed. Diagnostic Results     EKG   Normal sinus rhythm QTc 471 nonspecific ST-T wave abnormalities no significant change with atrial sensed ventricular paced rhythm    RADIOLOGY:  CT ABDOMEN PELVIS WO CONTRAST Additional Contrast? None   Final Result   1. Nonobstructing bilateral nephrolithiasis. 2. 1.3 cm exophytic renal lesion on the right with Hounsfield units greater than that of a simple cyst. MRI of the abdomen without and with contrast is recommended, renal mass protocol. 3. Mild fluid-filled small bowel loops without wall thickening or significant distention. This is nonspecific. There is no transition point. Bowel loops measure up to 2.9 cm which is within normal limits.           LABS:   Results for orders placed or performed during the hospital encounter of 01/11/22   CBC auto differential   Result Value Ref Range    WBC 12.3 (H) 4.0 - 11.0 K/uL    RBC 5.65 4.20 - 5.90 M/uL    Hemoglobin 15.8 13.5 - 17.5 g/dL    Hematocrit 48.3 40.5 - 52.5 %    MCV 85.5 80.0 - 100.0 fL    MCH 28.0 26.0 - 34.0 pg    MCHC 32.8 31.0 - 36.0 g/dL    RDW 15.1 12.4 - 15.4 %    Platelets 063 016 - 976 K/uL    MPV 7.8 5.0 - 10.5 fL    Neutrophils % 82.3 %    Lymphocytes % 8.4 %    Monocytes % 5.2 %    Eosinophils % 3.7 %    Basophils % 0.4 %    Neutrophils Absolute 10.1 (H) 1.7 - 7.7 K/uL    Lymphocytes Absolute 1.0 1.0 - 5.1 K/uL    Monocytes Absolute 0.6 0.0 - 1.3 K/uL    Eosinophils Absolute 0.5 0.0 - 0.6 K/uL    Basophils Absolute 0.1 0.0 - 0.2 K/uL   Basic Metabolic Panel w/ Reflex to MG   Result Value Ref Range    Sodium 138 136 - 145 mmol/L    Potassium reflex Magnesium 4.6 3.5 - 5.1 mmol/L    Chloride 98 (L) 99 - 110 mmol/L    CO2 30 21 - 32 mmol/L    Anion Gap 10 3 - 16    Glucose 123 (H) 70 - 99 mg/dL    BUN 41 (H) 7 - 20 mg/dL    CREATININE 2.8 (H) 0.8 - 1.3 mg/dL    GFR Non-African American 21 (A) >60    GFR  26 (A) >60    Calcium 10.8 (H) 8.3 - 10.6 mg/dL   Blood gas, venous (Lab)   Result Value Ref Range    pH, Yordy 7.369 7.350 - 7.450    pCO2, Yordy 56.0 (H) 41.0 - 51.0 mmHg    pO2, Yordy 36.6 25.0 - 40.0 mmHg    HCO3, Venous 32.3 (H) 24.0 - 28.0 mmol/L    Base Excess, Yordy 4.9 (H) -2.0 - 3.0 mmol/L    O2 Sat, Yordy 68 Not established %    Carboxyhemoglobin 0.9 0.0 - 1.5 %    MetHgb, Yordy 0.4 0.0 - 1.5 %    TC02 (Calc), Yordy 34 mmol/L    Hemoglobin, Yordy, Reduced 31.70 %   Lactic acid, plasma   Result Value Ref Range    Lactic Acid 1.2 0.4 - 2.0 mmol/L   Urinalysis, reflex to microscopic (Lab)   Result Value Ref Range    Color, UA Yellow Straw/Yellow    Clarity, UA Clear Clear    Glucose, Ur Negative Negative mg/dL    Bilirubin Urine Negative Negative    Ketones, Urine TRACE (A) Negative mg/dL    Specific Gravity, UA 1.020 1.005 - 1.030    Blood, Urine Negative Negative    pH, UA 7.0 5.0 - 8.0    Protein,  (A) Negative mg/dL    Urobilinogen, Urine 0.2 <2.0 E.U./dL    Nitrite, Urine Negative Negative    Leukocyte Esterase, Urine Negative Negative    Microscopic Examination YES     Urine Type NotGiven    Hepatic function panel (LFTs)   Result Value Ref Range    Total Protein 8.5 (H) 6.4 - 8.2 g/dL    Albumin 4.5 3.4 - 5.0 g/dL    Alkaline Phosphatase 52 40 - 129 U/L    ALT 18 10 - 40 U/L    AST 33 15 - 37 U/L    Total Bilirubin 0.4 0.0 - 1.0 mg/dL    Bilirubin, Direct <0.2 0.0 - 0.3 mg/dL    Bilirubin, Indirect see below 0.0 - 1.0 mg/dL   Lipase   Result Value Ref Range    Lipase 28.0 13.0 - 60.0 U/L   Microscopic Urinalysis   Result Value Ref Range    WBC, UA 0-2 0 - 5 /HPF    RBC, UA 0-2 0 - 4 /HPF   EKG 12 Lead   Result Value Ref Range    Ventricular Rate 61 BPM    Atrial Rate 61 BPM    P-R Interval 236 ms    QRS Duration 140 ms    Q-T Interval 468 ms    QTc Calculation (Bazett) 471 ms    P Axis 45 degrees    R Axis 110 degrees    T Axis 39 degrees    Diagnosis       EKG performed in ER and to be interpreted by ER physician. Confirmed by MD, ER ([id number]),  Max Iniguez ([id number]) on 1/11/2022 8:59:20 AM       RECENT VITALS:  BP: 124/77, Temp: 97.7 °F (36.5 °C), Pulse: 64, Resp: 18, SpO2: 97 %     Procedures         ED Course     The patient was given the following medications:  Orders Placed This Encounter   Medications    aluminum & magnesium hydroxide-simethicone (MAALOX) 30 mL, lidocaine viscous hcl (XYLOCAINE) 5 mL (GI COCKTAIL)    morphine injection 4 mg    metoclopramide (REGLAN) injection 5 mg       CONSULTS:  None    MEDICAL DECISION MAKING / ASSESSMENT / PLAN     Judah Garcia is a 80 y.o. male resented with abdominal pain and nausea. His abdomen is soft completely nontender. CT did show bilateral nonobstructing nephrolithiasis nonspecific small bowel distention no transition point exophytic mass right kidney which recommend follow-up. The patient was given p.o. challenge. He states he feels fine. He was ambulated up and down the halls. He had no chest pain shortness of breath EKG was performed which showed no significant change from prior. Repeat abdominal exam looks completely benign there is no tenderness no rebound or peritoneal signs. He did have a urinalysis which showed no evidence of infection. He was ambulated up and down the avila without difficulty. States he feels fine with no nausea or vomiting. I did discuss with the patient the exophytic right renal mass. He will follow-up with his primary for this. He is return for any vomiting that persist worsening symptoms shortness of breath cough chest pain.   He denies any shortness of breath or cough currently. He will have a COVID test since he did feel little weak earlier this week. Also instructed if he develops worsening nausea vomiting or any other symptoms he should return immediately. He will call for a ride home since he had driven here and was given morphine and I feel more comfortable with someone will drive him home. Addendum:    He has the patient was awaiting to be discharged she started vomiting again. Vomited noncoffee-ground emesis approximately 300 cc. Still nauseated. I feel he will probably need to be admitted now for antiemetics IV fluids and reassessment    Clinical Impression     1. Generalized abdominal pain    2. Mass of right kidney        Disposition     PATIENT REFERRED TO:  No follow-up provider specified.     DISCHARGE MEDICATIONS:  New Prescriptions    No medications on file       DISPOSITION          Cali Green MD  01/11/22 [id number]       Cali Green MD  01/11/22 [address] Jon Golden MD  01/11/22 3328

## 2022-01-11 NOTE — CONSULTS
Clinical Pharmacy Progress Note    Warfarin - Management by Pharmacy    Consult Date(s): 1/11  Consulting Provider(s): Dr Contreras Felipe / Plan    1. Hx of Afib - Warfarin   Goal INR: 2 - 3   Concurrent Anticoagulants / Antiplatelets: None   Interactions: No significant interactions noted that will affect INR. o Please note that Pepto Bismol (new med ordred QID) can increase risk of bleeding due to salicylate component.  Current Regimen: home regimen of 5mg PO daily   Plan / Rationale:   o INR today = 3.29. Higher than expected. o Plan to HOLD dose today. o Will begin home regimen of 5mg daily tomorrow evening. If patient is to be discharged home today -   o Recommend patient to check INR at home tomorrow, to be sure INR trending down after held dose.  Will monitor pt's clinical status and INR daily, and make dose adjustments as needed. 2.  Herbal medications:   · The following herbal medications were ordered, but not stocked by Pharmacy. Orders have been discontinued, and pt may resume upon discharge home. · CoEnzyme Q10    Thank you for consulting Pharmacy! Rozina GonzalezD., [facility]   1/11/2022 1:42 PM  Wireless: [phone]        Subjective/Objective: Mr. Miroslava Sotelo is a 80 y.o. male from home with a PMHx significant for HTN, HLP, Afib on warfarin, BPH, hypothyroidism, and recent kidney stones s/p cystoscopies and stent placement. Presents with distended abdomen and N/V.  CT showed bilat nonobstructing kidney stones, and he was found to have ERIC with further vomiting. Admitted for antiemetics and further monitoring of ERIC. Pharmacy has been consulted to dose warfarin per Dr Berenice Orlando. Height:   Ht Readings from Last 1 Encounters:   01/11/22 6' 1\" (1.854 m)     Weight:   Wt Readings from Last 1 Encounters:   01/11/22 190 lb (86.2 kg)       Prior / Home Warfarin Regimen:   Managed by [facility] -- target INR 2-3 for Afib.      Patient states home regimen is 5mg QHS, using 5mg tablets at home. He checks his INR on his home device. Last dose 1/10 PM.    Patient states he recently self-adjusted his dose downward; resulting INR was low, so he took two days of 10mg bolus doses, and is now back to 5mg QHS.     Date INR Warfarin   1/11 3.29 HOLD                    Labs / Ancillary Data:    Recent Labs     01/11/22  0524 01/11/22  0525 01/11/22  0624   HGB  --  15.8  --    PLT  --  191  --    LABALBU  --   --  4.5   CREATININE 2.8*  --   --

## 2022-01-20 ENCOUNTER — OFFICE VISIT (OUTPATIENT)
Dept: PULMONOLOGY | Age: 87
End: 2022-01-20
Payer: MEDICARE

## 2022-01-20 VITALS
HEIGHT: 73 IN | OXYGEN SATURATION: 95 % | DIASTOLIC BLOOD PRESSURE: 85 MMHG | WEIGHT: 197 LBS | TEMPERATURE: 97.5 F | SYSTOLIC BLOOD PRESSURE: 148 MMHG | RESPIRATION RATE: 16 BRPM | HEART RATE: 66 BPM | BODY MASS INDEX: 26.11 KG/M2

## 2022-01-20 DIAGNOSIS — J84.112 IPF (IDIOPATHIC PULMONARY FIBROSIS) (HCC): Primary | ICD-10-CM

## 2022-01-20 PROCEDURE — 99213 OFFICE O/P EST LOW 20 MIN: CPT | Performed by: INTERNAL MEDICINE

## 2022-01-20 NOTE — PROGRESS NOTES
[facility]    Outpatient follow-up note    Subjective:   Referring Physician: Saralyn Nyhan / HPI:     The patient is 80 y.o. male who presents today for a follow-up visit for interstitial lung disease and hypoxia. 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 He has had a difficult time of things overall of late because he had a 6 mm kidney stone that required multiple stents and lithotripsy before it could be removed but this was complicated by urinary tract infection and sepsis. Since that time he has had more difficulty with his balance and is concerned she is going to fall. He still lifts weights every other day and is remaining active. He had an abdominal CT at the time of his initial stone and recently had another abdominal CT scan because he had an episode of nausea and vomiting and abdominal distention. He did not require admission for that. 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 He says he does not need it anymore, but he does wear it at night. He was satting 89% when he presented on May 13 but is satting 98% today. He said he had a urinary tract infection soon after seeing me in the office that really laid him flat and it has taken a while to get built back up again. He says his strength and endurance are still not back to where they were before. His cough is resolved however. Initial visit: Patient was seen in this office many years ago for an abnormal PFT. He had a low forced vital capacity at that time but normal total lung capacity and diffusion. In looking at my notes from 2015 he did have some crackles on exam and I suspected an interstitial lung disease. 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   He reports that he had been breathing fine and exercising regularly until things started getting really bad about 3 weeks ago. He comes in with a family friend whose   of IPF. Patient also sees a cardiologist through [address] and in reviewing his notes mention that he was having difficulty with memory and that the shortness of breath symptoms may have been going on for a longer period of time. Patient's oxygen saturations on coming to the room were 89%. Patient had a CT of the abdomen done in February of this year that showed progression of basilar interstitial changes and fibrosis compared to the scan from . Patient did not recall having the CT scan. Patient also complains of a dry nonproductive cough       Past Medical History:    Past Medical History:   Diagnosis Date    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      Atrial fibrillation ([address]     on event monitor    BPH (benign prostatic hyperplasia)     Gout     HTN (hypertension)     Hyperlipidemia     Hypothyroidism     Kidney stones     Pacemaker        Social History:    Social History     Tobacco Use   Smoking Status Former Smoker    Packs/day: 1.00    Years: 10.00    Pack years: 10.00    Types: Cigarettes    Quit date: 1963    Years since quittin.0   Smokeless Tobacco Never Used       Family History:  Family History   Problem Relation Age of Onset    Other Sister         Rheumatic fever     Current Medications:  Current Outpatient Medications on File Prior to Visit   Medication Sig Dispense Refill    ondansetron (ZOFRAN) 4 MG tablet Take 1 tablet by mouth every 12 hours as needed for Nausea 14 tablet 0    diphenhydrAMINE-APAP, sleep, (TYLENOL PM EXTRA STRENGTH)  MG tablet Take 2 tablets by mouth nightly      folic acid (FOLVITE) 1 MG tablet Take 1 mg by mouth daily      vitamin D (CHOLECALCIFEROL) 25 MCG (1000 UT) TABS tablet Take 2,000 Units by mouth daily      coenzyme Q10 100 MG CAPS capsule Take 100 mg by mouth daily      levothyroxine (SYNTHROID) 112 MCG tablet Take 112 mcg by mouth every morning (before breakfast)      temazepam (RESTORIL) 15 MG capsule Take 15 mg by mouth nightly.  latanoprost (XALATAN) 0.005 % ophthalmic solution Place 1 drop into both eyes nightly      tamsulosin (FLOMAX) 0.4 MG capsule Take 0.4 mg by mouth daily      ipratropium-albuterol (DUONEB) 0.5-2.5 (3) MG/3ML SOLN nebulizer solution Inhale 1 vial into the lungs 4 times daily      furosemide (LASIX) 40 MG tablet Take 40 mg by mouth daily      turmeric 500 MG CAPS Take 500 mg by mouth daily      vitamin B-6 (PYRIDOXINE) 100 MG tablet Take 100 mg by mouth daily      amLODIPine (NORVASC) 5 MG tablet Take 5 mg by mouth daily as needed (BP > 140/90 at 10AM check)      sildenafil (REVATIO) 20 MG tablet Take 20 mg by mouth daily For dementia prevention      calcium-vitamin D (OSCAL-500) 500-200 MG-UNIT per tablet Take 1 tablet by mouth daily      vitamin B-12 (CYANOCOBALAMIN) 1000 MCG tablet Take 1,000 mcg by mouth daily      testosterone enanthate (DELATESTRYL) 200 MG/ML injection Inject 100 mg into the muscle every 14 days.  warfarin (COUMADIN) 5 MG tablet Take 5 mg by mouth nightly  30 tablet     simvastatin (ZOCOR) 20 MG tablet Take 10 mg by mouth nightly  30 tablet     finasteride (PROSCAR) 5 MG tablet Take 5 mg by mouth daily        No current facility-administered medications on file prior to visit. Allergies:   Allergies   Allergen Reactions    Allopurinol Other (See Comments)     Sun sensitivity  Sun poisoning      Penicillins Anaphylaxis     Bronchial spasms    Ciprofloxacin Hives    Iodides     Sulfa Antibiotics Hives    Sulfamethoxazole-Trimethoprim     Cephalosporins Rash       REVIEW OF SYSTEMS:    CONSTITUTIONAL: Negative for fevers and chills  HEENT: Negative for oropharyngeal exudate, post nasal drip, sinus pain / pressure, nasal congestion, ear pain  RESPIRATORY:  See HPI  CARDIOVASCULAR: Negative for chest pain, palpitations, edema  GASTROINTESTINAL: Negative for nausea, vomiting, diarrhea, constipation and abdominal pain  HEMATOLOGICAL: Negative for adenopathy  SKIN: Negative for clubbing, cyanosis, skin lesions  EXTREMITIES: Negative for weakness, decreased ROM  NEUROLOGICAL: Negative for unilateral weakness, speech or gait abnormalities  PSYCH: Negative for anxiety, depression    Objective:   PHYSICAL EXAM:        VITALS:  BP (!) 148/85 (Site: Left Upper Arm, Position: Sitting, Cuff Size: Medium Adult)   Pulse 66   Temp 97.5 °F (36.4 °C) (Infrared)   Resp 16   Ht 6' 1\" (1.854 m)   Wt 197 lb (89.4 kg)   SpO2 95%   BMI 25.99 kg/m²     CONSTITUTIONAL:  Awake, alert, cooperative, no apparent distress, and appears stated age  HEENT: No oropharyngeal exudate, PERRL, no cervical adenopathy, no tracheal deviation, thyroid size normal  LUNGS:  No increased work of breathing. Inspiratory crackles heard throughout the lung fields posteriorly and some anteriorly. CARDIOVASCULAR:  normal S1 and S2 and no JVD  ABDOMEN:  Normal bowel sounds, non-distended and non-tender to palpation  EXT: No edema, no calf tenderness. Pulses are present bilaterally. NEUROLOGIC:  Mental Status Exam:  Level of Alertness:   awake  Orientation:   person, place, time. SKIN:  normal skin color, texture, turgor, no redness, warmth, or swelling     DATA:      Radiology Review:  Pertinent images / reports were reviewed as a part of this visit. I personally reviewed his CT from 2015 next to the CT from February of this year. The obvious limitation was that the full lungs were not seen on the CT abdomen. It did show clear progression of septal thickening and subpleural predominant fibrotic changes.     Last PFTs: 2015, normal    Immunizations:   Immunization History   Administered Date(s) Administered    COVID-19, Shiv Hernández, Primary or Immunocompromised, PF, 100mcg/0.5mL 01/07/2021, 02/04/2021, 10/28/2021    Influenza A ([id number]) Vaccine PF IM 12/24/2009    Influenza Virus Vaccine 12/24/2009, 09/26/2013, 09/22/2014, 10/25/2018, 03/06/2020, 10/02/2020    Influenza, High Dose (Fluzone 65 yrs and older) 10/14/2015, 10/09/2017    Pneumococcal Conjugate 13-valent ([id number]) 03/06/2015, 10/14/2015    Pneumococcal Polysaccharide ([id number]) 01/01/1996    Td (Adult), 2 Lf Tetanus Toxoid, Pf (Td, Absorbed) 07/25/2008    Tdap (Boostrix, Adacel) 07/27/2012, 12/14/2015    Zoster Live (Zostavax) 02/24/2010       Assessment: This is a 80 y.o. male with IPF and acute hypoxemic respiratory failure    Plan:   -Patient's age makes IPF the most likely etiology. CT of the abdomen did not catch the lower portion of the lungs and does show some gradual progression of disease over the past several years. For IPF the progression is slow so it raises the question whether there is a different etiology. As the progression is slow and he has other comorbidities not know that there is much benefit in an aggressive work-up because I do not know that we be able to do better than what nature is doing already. We had previously discussed treating him with pirfenidone or Esbriet but he was not interested after I mentioned the potential GI side effects and that it only slows the disease by few months. Since he has had evidence of fibrotic lung disease for 5 years I do not think there would be much clinical benefit to these medications at this point anyway. Patient has disenrolled from hospice. I encouraged him to continue his daily exercises as he can tolerate them. It is unclear to me why patient who needed 3 L to walk is now on room air without any medical interventions. Time spent with patient 20minutes  - Tobacco use: The patient is not currently smoking and quit over 60 years ago. - RTC 6 months w/ MD. Call or RTC sooner if symptoms persist or worsen acutely.

## 2022-05-20 ENCOUNTER — TELEPHONE (OUTPATIENT)
Dept: PULMONOLOGY | Age: 87
End: 2022-05-20

## 2022-05-20 RX ORDER — PREDNISONE 10 MG/1
TABLET ORAL
Qty: 20 TABLET | Refills: 0 | Status: SHIPPED | OUTPATIENT
Start: 2022-05-20

## 2022-05-20 NOTE — TELEPHONE ENCOUNTER
Sxs:     Cough? yes         Productive?  yes    Color of Mucus? SOB? yes      Nasal Congestion? Runny Nose/Color? Sore Throat? Fever? Last recorded temp? OTC Meds Tried? Taking routine pulmonary meds?  yes    Pharmacy: [facility]    [address] #: [phone]

## 2022-05-20 NOTE — TELEPHONE ENCOUNTER
Patient has a worsening cough and a UTI. He's already on doxycycline. His nebulizer is helping, so I'm going to order him a burst of prednisone. Orders Placed This Encounter   Medications    predniSONE (DELTASONE) 10 MG tablet     Sig: Take 4 tablets by mouth for 5 days.      Dispense:  20 tablet     Refill:  0

## 2022-05-23 ENCOUNTER — APPOINTMENT (OUTPATIENT)
Dept: CT IMAGING | Age: 87
End: 2022-05-23
Payer: MEDICARE

## 2022-05-23 ENCOUNTER — HOSPITAL ENCOUNTER (EMERGENCY)
Age: 87
Discharge: HOME | End: 2022-05-23
Attending: EMERGENCY MEDICINE
Payer: MEDICARE

## 2022-05-23 ENCOUNTER — TELEPHONE (OUTPATIENT)
Dept: PULMONOLOGY | Age: 87
End: 2022-05-23

## 2022-05-23 ENCOUNTER — APPOINTMENT (OUTPATIENT)
Dept: GENERAL RADIOLOGY | Age: 87
End: 2022-05-23
Payer: MEDICARE

## 2022-05-23 VITALS
BODY MASS INDEX: 25.98 KG/M2 | RESPIRATION RATE: 19 BRPM | TEMPERATURE: 97.8 F | HEART RATE: 65 BPM | DIASTOLIC BLOOD PRESSURE: 74 MMHG | SYSTOLIC BLOOD PRESSURE: 148 MMHG | OXYGEN SATURATION: 94 % | WEIGHT: 196 LBS | HEIGHT: 73 IN

## 2022-05-23 DIAGNOSIS — R05.9 COUGH: Primary | ICD-10-CM

## 2022-05-23 LAB
ANION GAP SERPL CALCULATED.3IONS-SCNC: 15 MMOL/L (ref 3–16)
BASE EXCESS VENOUS: 0.3 MMOL/L (ref -2–3)
BASOPHILS ABSOLUTE: 0 K/UL (ref 0–0.2)
BASOPHILS RELATIVE PERCENT: 0 %
BILIRUBIN URINE: NEGATIVE
BLOOD, URINE: ABNORMAL
BUN BLDV-MCNC: 56 MG/DL (ref 7–20)
CALCIUM SERPL-MCNC: 10.5 MG/DL (ref 8.3–10.6)
CARBOXYHEMOGLOBIN: 1.8 % (ref 0–1.5)
CHLORIDE BLD-SCNC: 101 MMOL/L (ref 99–110)
CLARITY: CLEAR
CO2: 22 MMOL/L (ref 21–32)
COLOR: YELLOW
CREAT SERPL-MCNC: 2.4 MG/DL (ref 0.8–1.3)
EKG ATRIAL RATE: 71 BPM
EKG DIAGNOSIS: NORMAL
EKG P AXIS: 28 DEGREES
EKG P-R INTERVAL: 180 MS
EKG Q-T INTERVAL: 474 MS
EKG QRS DURATION: 170 MS
EKG QTC CALCULATION (BAZETT): 515 MS
EKG R AXIS: 119 DEGREES
EKG T AXIS: 36 DEGREES
EKG VENTRICULAR RATE: 71 BPM
EOSINOPHILS ABSOLUTE: 0 K/UL (ref 0–0.6)
EOSINOPHILS RELATIVE PERCENT: 0 %
EPITHELIAL CELLS, UA: NORMAL /HPF (ref 0–5)
GFR AFRICAN AMERICAN: 31
GFR NON-AFRICAN AMERICAN: 26
GLUCOSE BLD-MCNC: 345 MG/DL (ref 70–99)
GLUCOSE URINE: >=1000 MG/DL
HCO3 VENOUS: 25.4 MMOL/L (ref 24–28)
HCT VFR BLD CALC: 37.1 % (ref 40.5–52.5)
HEMOGLOBIN, VEN, REDUCED: 45.8 %
HEMOGLOBIN: 12.1 G/DL (ref 13.5–17.5)
INR BLD: 1.53 (ref 0.88–1.12)
KETONES, URINE: NEGATIVE MG/DL
LEUKOCYTE ESTERASE, URINE: NEGATIVE
LYMPHOCYTES ABSOLUTE: 0.5 K/UL (ref 1–5.1)
LYMPHOCYTES RELATIVE PERCENT: 5 %
MCH RBC QN AUTO: 26.9 PG (ref 26–34)
MCHC RBC AUTO-ENTMCNC: 32.5 G/DL (ref 31–36)
MCV RBC AUTO: 82.9 FL (ref 80–100)
METHEMOGLOBIN VENOUS: 0.5 % (ref 0–1.5)
MICROSCOPIC EXAMINATION: YES
MONOCYTES ABSOLUTE: 0.8 K/UL (ref 0–1.3)
MONOCYTES RELATIVE PERCENT: 8 %
NEUTROPHILS ABSOLUTE: 8.7 K/UL (ref 1.7–7.7)
NEUTROPHILS RELATIVE PERCENT: 87 %
NITRITE, URINE: NEGATIVE
O2 SAT, VEN: 53 %
PCO2, VEN: 43.5 MMHG (ref 41–51)
PDW BLD-RTO: 15.2 % (ref 12.4–15.4)
PH UA: 6 (ref 5–8)
PH VENOUS: 7.38 (ref 7.35–7.45)
PLATELET # BLD: 171 K/UL (ref 135–450)
PLATELET SLIDE REVIEW: ADEQUATE
PMV BLD AUTO: 7.6 FL (ref 5–10.5)
PO2, VEN: <30 MMHG (ref 25–40)
POTASSIUM REFLEX MAGNESIUM: 4.7 MMOL/L (ref 3.5–5.1)
PRO-BNP: 912 PG/ML (ref 0–449)
PROTEIN UA: 30 MG/DL
PROTHROMBIN TIME: 17.6 SEC (ref 9.9–12.7)
RAPID INFLUENZA  B AGN: NEGATIVE
RAPID INFLUENZA A AGN: NEGATIVE
RBC # BLD: 4.48 M/UL (ref 4.2–5.9)
RBC # BLD: NORMAL 10*6/UL
RBC UA: NORMAL /HPF (ref 0–4)
SODIUM BLD-SCNC: 138 MMOL/L (ref 136–145)
SPECIFIC GRAVITY UA: 1.01 (ref 1–1.03)
TCO2 CALC VENOUS: 27 MMOL/L
TROPONIN: 0.01 NG/ML
TROPONIN: 0.02 NG/ML
URINE REFLEX TO CULTURE: ABNORMAL
URINE TYPE: ABNORMAL
UROBILINOGEN, URINE: 0.2 E.U./DL
WBC # BLD: 10 K/UL (ref 4–11)
WBC UA: NORMAL /HPF (ref 0–5)

## 2022-05-23 PROCEDURE — 6370000000 HC RX 637 (ALT 250 FOR IP): Performed by: EMERGENCY MEDICINE

## 2022-05-23 PROCEDURE — 71250 CT THORAX DX C-: CPT

## 2022-05-23 PROCEDURE — 93005 ELECTROCARDIOGRAM TRACING: CPT | Performed by: EMERGENCY MEDICINE

## 2022-05-23 PROCEDURE — 80048 BASIC METABOLIC PNL TOTAL CA: CPT

## 2022-05-23 PROCEDURE — 87804 INFLUENZA ASSAY W/OPTIC: CPT

## 2022-05-23 PROCEDURE — 71046 X-RAY EXAM CHEST 2 VIEWS: CPT

## 2022-05-23 PROCEDURE — 36415 COLL VENOUS BLD VENIPUNCTURE: CPT

## 2022-05-23 PROCEDURE — 85025 COMPLETE CBC W/AUTO DIFF WBC: CPT

## 2022-05-23 PROCEDURE — 81001 URINALYSIS AUTO W/SCOPE: CPT

## 2022-05-23 PROCEDURE — 83880 ASSAY OF NATRIURETIC PEPTIDE: CPT

## 2022-05-23 PROCEDURE — 82803 BLOOD GASES ANY COMBINATION: CPT

## 2022-05-23 PROCEDURE — U0003 INFECTIOUS AGENT DETECTION BY NUCLEIC ACID (DNA OR RNA); SEVERE ACUTE RESPIRATORY SYNDROME CORONAVIRUS 2 (SARS-COV-2) (CORONAVIRUS DISEASE [COVID-19]), AMPLIFIED PROBE TECHNIQUE, MAKING USE OF HIGH THROUGHPUT TECHNOLOGIES AS DESCRIBED BY CMS-2020-01-R: HCPCS

## 2022-05-23 PROCEDURE — 84484 ASSAY OF TROPONIN QUANT: CPT

## 2022-05-23 PROCEDURE — U0005 INFEC AGEN DETEC AMPLI PROBE: HCPCS

## 2022-05-23 PROCEDURE — 85610 PROTHROMBIN TIME: CPT

## 2022-05-23 PROCEDURE — 99285 EMERGENCY DEPT VISIT HI MDM: CPT

## 2022-05-23 RX ORDER — DOXYCYCLINE HYCLATE 100 MG
100 TABLET ORAL 2 TIMES DAILY
COMMUNITY

## 2022-05-23 RX ORDER — BENZONATATE 100 MG/1
200 CAPSULE ORAL ONCE
Status: COMPLETED | OUTPATIENT
Start: 2022-05-23 | End: 2022-05-23

## 2022-05-23 RX ORDER — BENZONATATE 100 MG/1
100 CAPSULE ORAL 3 TIMES DAILY PRN
Qty: 20 CAPSULE | Refills: 0 | Status: SHIPPED | OUTPATIENT
Start: 2022-05-23 | End: 2022-05-30

## 2022-05-23 RX ADMIN — BENZONATATE 200 MG: 100 CAPSULE ORAL at 16:38

## 2022-05-23 ASSESSMENT — PAIN - FUNCTIONAL ASSESSMENT: PAIN_FUNCTIONAL_ASSESSMENT: NONE - DENIES PAIN

## 2022-05-23 NOTE — TELEPHONE ENCOUNTER
Debra from [facility] called [phone], pt's daughter, Real Mckeon [phone] called Debra about pt's productive cough. Gale Carrillo was out 2x this weekend to see pt and she feels that his health has declined. She states pt has UTI and productive cough (yellowish mucous). Pt is taking doxycyline along with prednisone. Gale Carrillo thought that Dr Dwain Myers would like to do a video visit or tele visit with pt. She feels he should not be brought out of the house. Please advise.  Thank you

## 2022-05-23 NOTE — TELEPHONE ENCOUNTER
Sujatha Branham calls again to see if Dr. Claudia Gomez has responded yet. Patient wants to stay home but daughters think that he needs more care. Sujatha Branham would leave this decision up to daughters and patient.   Sujatha Branham can be reached at [phone]

## 2022-05-23 NOTE — ED PROVIDER NOTES
[address] ENCOUNTER          EM ATTENDING NOTE       Date of evaluation: 5/23/2022    Chief Complaint     Shortness of Breath and Urinary Tract Infection      History of Present Illness     Clover Lanier is a 80 y.o. male who presents to the emerge department shortness of breath and cough. Patient with history of CAD, A. fib on Coumadin status post pacemaker, hypertension, hyperlipidemia and IPF on prednisone who now presents to the emergency department for shortness of breath, cough and concern for urinary tract infection. Patient is last Friday he started experiencing dysuria as well as dyspnea on exertion. He was started on doxycycline has been taking this. He is also notes increasing cough over this time as well. He reports 1 episodes of yellow/green mucus production with the cough but otherwise has not had any increased fume production. He denies any fever. He denies any chest pain, nausea or vomiting. He denies any abdominal pain or changes in bowel function. Review of Systems     Positive: Dysuria, cough    Negative: fever, chest pain, abdominal pain, nausea, vomiting, changes in bowel function, headache, sore throat     See HPI. A complete review of systems wasconducted and is otherwise negative unless noted above. Past Medical, Surgical, Family, and Social History     He has a past medical history of Allergy to sunlight, Atrial fibrillation ([address] BPH (benign prostatic hyperplasia), Gout, HTN (hypertension), Hyperlipidemia, Hypothyroidism, Kidney stones, and Pacemaker. He has a past surgical history that includes Spine surgery (2007); Skin cancer excision; Tibia fracture surgery; and Skin cancer excision (2012). His family history includes Other in his sister. He reports that he quit smoking about 59 years ago. His smoking use included cigarettes. He has a 10.00 pack-year smoking history.  He has never used smokeless tobacco. He reports current alcohol use of about 5.0 standard drinks of alcohol per week. He reports that he does not use drugs. Medications     Discharge Medication List as of 5/23/2022  8:06 PM      CONTINUE these medications which have NOT CHANGED    Details   doxycycline hyclate (VIBRA-TABS) 100 MG tablet Take 100 mg by mouth 2 times dailyHistorical Med      predniSONE (DELTASONE) 10 MG tablet Take 4 tablets by mouth for 5 days. , Disp-20 tablet, R-0Normal      diphenhydrAMINE-APAP, sleep, (TYLENOL PM EXTRA STRENGTH)  MG tablet Take 2 tablets by mouth nightlyHistorical Med      folic acid (FOLVITE) 1 MG tablet Take 1 mg by mouth dailyHistorical Med      vitamin D (CHOLECALCIFEROL) 25 MCG (1000 UT) TABS tablet Take 2,000 Units by mouth dailyHistorical Med      coenzyme Q10 100 MG CAPS capsule Take 100 mg by mouth dailyHistorical Med      levothyroxine (SYNTHROID) 112 MCG tablet Take 112 mcg by mouth every morning (before breakfast)Historical Med      temazepam (RESTORIL) 15 MG capsule Take 15 mg by mouth nightly. Historical Med      latanoprost (XALATAN) 0.005 % ophthalmic solution Place 1 drop into both eyes nightlyHistorical Med      tamsulosin (FLOMAX) 0.4 MG capsule Take 0.4 mg by mouth dailyHistorical Med      ipratropium-albuterol (DUONEB) 0.5-2.5 (3) MG/3ML SOLN nebulizer solution Inhale 1 vial into the lungs 4 times dailyHistorical Med      furosemide (LASIX) 40 MG tablet Take 40 mg by mouth dailyHistorical Med      turmeric 500 MG CAPS Take 500 mg by mouth dailyHistorical Med      vitamin B-6 (PYRIDOXINE) 100 MG tablet Take 100 mg by mouth dailyHistorical Med      amLODIPine (NORVASC) 5 MG tablet Take 5 mg by mouth daily as needed (BP > 140/90 at 10AM check)Historical Med      sildenafil (REVATIO) 20 MG tablet Take 20 mg by mouth daily For dementia preventionHistorical Med      calcium-vitamin D (OSCAL-500) 500-200 MG-UNIT per tablet Take 1 tablet by mouth dailyHistorical Med      vitamin B-12 (CYANOCOBALAMIN) 1000 MCG tablet Take 1,000 mcg by mouth dailyHistorical Med      testosterone enanthate (DELATESTRYL) 200 MG/ML injection Inject 100 mg into the muscle every 14 days. Historical Med      warfarin (COUMADIN) 5 MG tablet Take 5 mg by mouth nightly , Disp-30 tabletHistorical Med      simvastatin (ZOCOR) 20 MG tablet Take 10 mg by mouth nightly , Disp-30 tabletHistorical Med      finasteride (PROSCAR) 5 MG tablet Take 5 mg by mouth daily Historical Med             Allergies     He is allergic to allopurinol, penicillins, ciprofloxacin, iodides, sulfa antibiotics, sulfamethoxazole-trimethoprim, and cephalosporins. Physical Exam     INITIAL VITALS: BP: (!) 160/88, Temp: 97.8 °F (36.6 °C), Pulse: 96, Resp: 30, SpO2: 96 %   Physical Exam    General:  Well developed adult male in no acute distress, able toconverse in full sentences  HEENT:  Normocephalic, atraumatic, PERRL, extra-ocular movements intact, oropharynx benign  Neck:  Supple, no lymphadenopathy, trachea midline, no masses  Pulmonary:   Coarse breath sounds bilaterally with no focal abnormalities  Cardiac:  Regular rate and rhythm, no M/R/G  Abdomen:  Soft, non-tender, non-distended, no rebounding or guarding  Musculoskeletal:  2+ pulses, no edema or clubbing  Skin:  No concerning rashes or lesions, no cyanosis  Neuro: Alert and oriented X 4,able to move all four extremities with equal strength bilaterally, sensory exam grossly intact, cranial nerves II-XII intact  Psych:  Appropriate mood and affect    Diagnostic Results     EKG   EKG performed in the setting of shortness of breath shows a atrial sensed, ventricularly paced rhythm at rate of 71 bpm.  Normal NH interval.  Prolonged QRS and QTc as you would anticipate for paced rhythm. There are no ST segment changes consistent with STEMI. When compared to previous EKG from 1/11/2022 I find overall similar morphology. Final interpretation is paced rhythm with no acute signs of ischemia.     RADIOLOGY:  CT CHEST WO CONTRAST   Final Result      1. Chronic diffuse bronchiectasis and interstitial lung disease and pulmonary fibrosis  , as described above without diffuse segmental pneumonia. Areas of pleural and subpleural fibrotic changes and reticulonodular scarring are noted, greater in the    right lung. 2. No pleural effusion. Pacemaker wires evident with atherosclerotic disease of the left anterior descending coronary artery   3. 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 Diffuse bilateral interstitial and alveolar airspace disease, right greater than left may reflect acute and/or chronic change. Pulmonary fibrosis would be a consideration in this patient. Correlate clinically.           LABS:   Results for orders placed or performed during the hospital encounter of 05/23/22   Rapid Flu Swab    Specimen: Nasopharyngeal   Result Value Ref Range    Rapid Influenza A Ag Negative Negative    Rapid Influenza B Ag Negative Negative   CBC with Auto Differential   Result Value Ref Range    WBC 10.0 4.0 - 11.0 K/uL    RBC 4.48 4.20 - 5.90 M/uL    Hemoglobin 12.1 (L) 13.5 - 17.5 g/dL    Hematocrit 37.1 (L) 40.5 - 52.5 %    MCV 82.9 80.0 - 100.0 fL    MCH 26.9 26.0 - 34.0 pg    MCHC 32.5 31.0 - 36.0 g/dL    RDW 15.2 12.4 - 15.4 %    Platelets 751 214 - 745 K/uL    MPV 7.6 5.0 - 10.5 fL    PLATELET SLIDE REVIEW Adequate     Neutrophils % 87.0 %    Lymphocytes % 5.0 %    Monocytes % 8.0 %    Eosinophils % 0.0 %    Basophils % 0.0 %    Neutrophils Absolute 8.7 (H) 1.7 - 7.7 K/uL    Lymphocytes Absolute 0.5 (L) 1.0 - 5.1 K/uL    Monocytes Absolute 0.8 0.0 - 1.3 K/uL    Eosinophils Absolute 0.0 0.0 - 0.6 K/uL    Basophils Absolute 0.0 0.0 - 0.2 K/uL    RBC Morphology Normal    Basic Metabolic Panel w/ Reflex to MG   Result Value Ref Range    Sodium 138 136 - 145 mmol/L    Potassium reflex Magnesium 4.7 3.5 - 5.1 mmol/L    Chloride 101 99 - 110 mmol/L    CO2 22 21 - 32 mmol/L    Anion Gap 15 3 - 16    Glucose 345 (H) 70 - 99 mg/dL    BUN 56 (H) 7 - 20 mg/dL    CREATININE 2.4 (H) 0.8 - 1.3 mg/dL    GFR Non-African American 26 (A) >60    GFR  31 (A) >60    Calcium 10.5 8.3 - 10.6 mg/dL   Troponin   Result Value Ref Range    Troponin 0.02 (H) <0.01 ng/mL   Brain Natriuretic Peptide   Result Value Ref Range    Pro- (H) 0 - 449 pg/mL   Protime-INR   Result Value Ref Range    Protime 17.6 (H) 9.9 - 12.7 sec    INR 1.53 (H) 0.88 - 1.12   Blood Gas, Venous   Result Value Ref Range    pH, Yordy 7.375 7.350 - 7.450    pCO2, Yordy 43.5 41.0 - 51.0 mmHg    pO2, Yordy <30.0 25.0 - 40.0 mmHg    HCO3, Venous 25.4 24.0 - 28.0 mmol/L    Base Excess, Yordy 0.3 -2.0 - 3.0 mmol/L    O2 Sat, Yordy 53 Not established %    Carboxyhemoglobin 1.8 (H) 0.0 - 1.5 %    MetHgb, Yordy 0.5 0.0 - 1.5 %    TC02 (Calc), Yordy 27 mmol/L    Hemoglobin, Yordy, Reduced 45.80 %   Urinalysis with Reflex to Culture    Specimen: Urine   Result Value Ref Range    Color, UA Yellow Straw/Yellow    Clarity, UA Clear Clear    Glucose, Ur >=1000 (A) Negative mg/dL    Bilirubin Urine Negative Negative    Ketones, Urine Negative Negative mg/dL    Specific Gravity, UA 1.015 1.005 - 1.030    Blood, Urine SMALL (A) Negative    pH, UA 6.0 5.0 - 8.0    Protein, UA 30 (A) Negative mg/dL    Urobilinogen, Urine 0.2 <2.0 E.U./dL    Nitrite, Urine Negative Negative    Leukocyte Esterase, Urine Negative Negative    Microscopic Examination YES     Urine Type Voided     Urine Reflex to Culture Not Indicated    Troponin   Result Value Ref Range    Troponin 0.01 <0.01 ng/mL   Microscopic Urinalysis   Result Value Ref Range    WBC, UA 0-2 0 - 5 /HPF    RBC, UA 3-4 0 - 4 /HPF    Epithelial Cells, UA 2-5 0 - 5 /HPF   EKG 12 Lead   Result Value Ref Range    Ventricular Rate 71 BPM    Atrial Rate 71 BPM    P-R Interval 180 ms    QRS Duration 170 ms    Q-T Interval 474 ms    QTc Calculation (Bazett) 515 ms    P Axis 28 degrees    R Axis 119 degrees    T Axis 36 degrees    Diagnosis       EKG performed in ER and to be interpreted by ER physician. Confirmed by MD, ER ([id number]),  Lety Pelayo ([phone]) on 5/23/2022 4:40:38 PM       ED BEDSIDE ULTRASOUND:      RECENT VITALS:  BP: (!) 148/74, Temp: 97.8 °F (36.6 °C), Pulse: 65, Resp: 19, SpO2: 94 %     Procedures         ED Course     Nursing Notes, Past Medical Hx, Past Surgical Hx, Social Hx, Allergies, and Family Hx were reviewed. The patient was given the following medications:  Orders Placed This Encounter   Medications    benzonatate (TESSALON) capsule 200 mg    benzonatate (TESSALON) 100 MG capsule     Sig: Take 1 capsule by mouth 3 times daily as needed for Cough     Dispense:  20 capsule     Refill:  0       CONSULTS:  None    MEDICAL DECISION MAKING / ASSESSMENT / Estelita Merrick is a 80 y.o. male with a history andpresentation as described above in HPI. The patient was evaluated by myself, the ED Attending Physician. On initial encounter the patient was in no acute distress with reassuring vitals and no signs of impending hemodynamic or respiratory collapse. Patient presents to the emergency department with request of his pulmonologist concerning for chronic cough. Patient does have history of pulmonary fibrosis was concerned there could be an exacerbation versus superimposed infection. The patient is also concerned whether or not his urinary tract infection has resolved. He is overall well-appearing initial examination. We will proceed with laboratory work-up, EKG and imaging. Urinalysis is not consistent with infection. Laboratory work-up is overall reassuring. EKG without signs of acute ischemia. Chest x-ray is difficult to interpret given the chronic changes. We will proceed with cross-sectional imaging for better evaluation of possible infectious process. Cross-sectional imaging does not show any signs of superimposed pneumonia.   The patient will be discharged with symptomatic care for his cough and possible IPF exacerbation. He is already on steroids per his pulmonologist.  He will continue his course of antibiotics for a urinary tract infection which appears to be resolving. Patient was treated with below medications:  Medications   benzonatate (TESSALON) capsule 200 mg (200 mg Oral Given 5/23/22 8703)       At this time the patient has been deemed safe for discharge. Verbal discharge instructionsincluding strict return precautions for worsening or new symptoms have been communicated. The patient was advised to follow up with primary care and pulmonology. Clinical Impression     1. Cough        Disposition     PATIENT REFERREDTO:  Ariel Vaca MD  [id number] [facility] [place]    Schedule an appointment as soon as possible for a visit   As needed      DISCHARGE MEDICATIONS:  Discharge Medication List as of 5/23/2022  8:06 PM      START taking these medications    Details   benzonatate (TESSALON) 100 MG capsule Take 1 capsule by mouth 3 times daily as needed for Cough, Disp-20 capsule, R-0Print             DISPOSITION Decision To Discharge 05/23/2022 08:03:59 PM          Aleks Medrano MD  05/30/22 7500

## 2022-05-23 NOTE — TELEPHONE ENCOUNTER
Please advise patient DTR Fernando Phillips called back and with hopes to get a call back ASAP please see previous messages. Patient got Prednisone on 5-20-22 and has not seen any improvement. She would like the doc on call to call her back. She wants to know if patient should be evaluated in a hospital setting. [phone]

## 2022-05-23 NOTE — TELEPHONE ENCOUNTER
I tried to call Susu Jennings first but got a non-descript voice mail. I call Ed who sounds worse than when I spoke with him last week, and his daughter Elvin Lopez got on the phone. She was able to correct for me that patient had been given nitrofurantoin, not doxycycline for his UTI. He's on day 3 of 5 of prednisone and his cough and shortness of breath are worse. He claims his saturations are fine however, as of yesterday. They are concerned because he's more dyspneic and unsteady and wanted to bring him in to be evaluated. I'm in the ICU and even if I could see him in the office I'd be very limited in what I could offer as I can't get lab work or imaging while there. They are instead going to go to the ED which I agree with. I anticipate he'll need a CT chest to evaluate because his chest xray will be difficult to interpret because of his baseline abnormalities.

## 2022-05-23 NOTE — ED TRIAGE NOTES
PT came to ED for SOB with Hx of pulmonary fibrosis. Pt states he has had cough and weakness for 4 days progressively getting worse. Pt also states he has burning with urination and has been taking antibiotics for uti since Friday.

## 2022-05-24 ENCOUNTER — TELEPHONE (OUTPATIENT)
Dept: PULMONOLOGY | Age: 87
End: 2022-05-24

## 2022-05-24 LAB — SARS-COV-2: NOT DETECTED

## 2022-05-24 RX ORDER — PREDNISONE 10 MG/1
TABLET ORAL
Qty: 20 TABLET | Refills: 0 | OUTPATIENT
Start: 2022-05-24

## 2022-05-24 NOTE — TELEPHONE ENCOUNTER
Antionette Osborne calls to inquire what the next step is for patient. Does he need to be seen in office? Note: Communication preference (HIPAA)   Needs to be completed. Prior one in 7/2021 states that information is not to be shared with anyone. Jag Dillard is willing to stop by office and  new form.

## 2022-05-25 NOTE — TELEPHONE ENCOUNTER
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 The ED note isn't complete, so I can't tell everything they did. His CT does look worse than 5 years ago, but no obvious pneumonia. If we can get him into the office sooner than the July appointment, I'm happy to go over the results with he and his daughters.

## 2022-05-26 ENCOUNTER — TELEPHONE (OUTPATIENT)
Dept: PULMONOLOGY | Age: 87
End: 2022-05-26

## 2022-05-26 RX ORDER — PREDNISONE 10 MG/1
TABLET ORAL
Qty: 22 TABLET | Refills: 0 | Status: SHIPPED | OUTPATIENT
Start: 2022-05-26 | End: 2022-06-05

## 2022-05-26 RX ORDER — PREDNISONE 10 MG/1
TABLET ORAL
Qty: 20 TABLET | Refills: 0 | OUTPATIENT
Start: 2022-05-26

## 2022-05-26 NOTE — TELEPHONE ENCOUNTER
Since patient now says the steroids are helping, I wrote for a burst and taper to get him to his appointment next week.   Orders Placed This Encounter   Medications    predniSONE (DELTASONE) 10 MG tablet     Si tabs for 3 days, then 2 tabs for 3 days, then 1 tab for 4 days     Dispense:  22 tablet     Refill:  0

## 2022-06-01 ENCOUNTER — OFFICE VISIT (OUTPATIENT)
Dept: PULMONOLOGY | Age: 87
End: 2022-06-01
Payer: MEDICARE

## 2022-06-01 VITALS
BODY MASS INDEX: 25.47 KG/M2 | OXYGEN SATURATION: 99 % | HEIGHT: 72 IN | HEART RATE: 63 BPM | RESPIRATION RATE: 16 BRPM | SYSTOLIC BLOOD PRESSURE: 123 MMHG | WEIGHT: 188 LBS | DIASTOLIC BLOOD PRESSURE: 76 MMHG

## 2022-06-01 DIAGNOSIS — J84.112 IPF (IDIOPATHIC PULMONARY FIBROSIS) (HCC): Primary | ICD-10-CM

## 2022-06-01 DIAGNOSIS — J96.11 CHRONIC RESPIRATORY FAILURE WITH HYPOXIA (HCC): ICD-10-CM

## 2022-06-01 PROCEDURE — 1123F ACP DISCUSS/DSCN MKR DOCD: CPT | Performed by: INTERNAL MEDICINE

## 2022-06-01 PROCEDURE — 99215 OFFICE O/P EST HI 40 MIN: CPT | Performed by: INTERNAL MEDICINE

## 2022-06-01 RX ORDER — RIVAROXABAN 20 MG/1
TABLET, FILM COATED ORAL
COMMUNITY
Start: 2022-04-27

## 2022-06-01 RX ORDER — OMEPRAZOLE 20 MG/1
20 CAPSULE, DELAYED RELEASE ORAL EVERY EVENING
Qty: 90 CAPSULE | Refills: 1 | Status: SHIPPED | OUTPATIENT
Start: 2022-06-01 | End: 2022-09-19

## 2022-06-01 NOTE — PROGRESS NOTES
[facility]    Outpatient follow-up note    Subjective:   Referring Physician: Rahul Shanks / HPI:     The patient is 80 y.o. male who presents today for a follow-up visit for interstitial lung disease and hypoxia. 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 Patient has been having more cough, shortness of breath and light headedness over the past month. He's on a burst and taper of steroids. His symptoms are roughly the same, but possibly improved on the steroids. He had a CT chest when he was in the ED and he's had a lot of progression of his ILD/IPF. He remains in hospice. 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 He says he does not need it anymore, but he does wear it at night. He was satting 89% when he presented on May 13 but is satting 98% today. He said he had a urinary tract infection soon after seeing me in the office that really laid him flat and it has taken a while to get built back up again. He says his strength and endurance are still not back to where they were before. His cough is resolved however. Initial visit: Patient was seen in this office many years ago for an abnormal PFT. He had a low forced vital capacity at that time but normal total lung capacity and diffusion. In looking at my notes from  he did have some crackles on exam and I suspected an interstitial lung disease. 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 He reports that he had been breathing fine and exercising regularly until things started getting really bad about 3 weeks ago. He comes in with a family friend whose   of IPF.   Patient also sees a cardiologist through [facility] and in reviewing his notes mention that he was having difficulty with memory and that the shortness of breath symptoms may have been going on for a longer period of time. Patient's oxygen saturations on coming to the room were 89%. Patient had a CT of the abdomen done in February of this year that showed progression of basilar interstitial changes and fibrosis compared to the scan from 2015. Patient did not recall having the CT scan. Patient also complains of a dry nonproductive cough       Past Medical History:    Past Medical History:   Diagnosis Date    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  Atrial fibrillation ([address]     on event monitor    BPH (benign prostatic hyperplasia)     Gout     HTN (hypertension)     Hyperlipidemia     Hypothyroidism     Kidney stones     Pacemaker        Social History:    Social History     Tobacco Use   Smoking Status Former Smoker    Packs/day: 1.00    Years: 10.00    Pack years: 10.00    Types: Cigarettes    Quit date: 1963    Years since quittin.4   Smokeless Tobacco Never Used       Family History:  Family History   Problem Relation Age of Onset    Other Sister         Rheumatic fever     Current Medications:  Current Outpatient Medications on File Prior to Visit   Medication Sig Dispense Refill    XARELTO 20 MG TABS tablet       predniSONE (DELTASONE) 10 MG tablet 4 tabs for 3 days, then 2 tabs for 3 days, then 1 tab for 4 days 22 tablet 0    doxycycline hyclate (VIBRA-TABS) 100 MG tablet Take 100 mg by mouth 2 times daily      predniSONE (DELTASONE) 10 MG tablet Take 4 tablets by mouth for 5 days.  20 tablet 0    diphenhydrAMINE-APAP, sleep, (TYLENOL PM EXTRA STRENGTH)  MG tablet Take 2 tablets by mouth nightly      folic acid (FOLVITE) 1 MG tablet Take 1 mg by mouth daily      vitamin D (CHOLECALCIFEROL) 25 MCG (1000 UT) TABS tablet Take 2,000 Units by mouth daily      coenzyme Q10 100 MG CAPS capsule Take 100 mg by mouth daily      levothyroxine (SYNTHROID) 112 MCG tablet Take 112 mcg by mouth every morning (before breakfast)  temazepam (RESTORIL) 15 MG capsule Take 15 mg by mouth nightly.  latanoprost (XALATAN) 0.005 % ophthalmic solution Place 1 drop into both eyes nightly      tamsulosin (FLOMAX) 0.4 MG capsule Take 0.4 mg by mouth daily      ipratropium-albuterol (DUONEB) 0.5-2.5 (3) MG/3ML SOLN nebulizer solution Inhale 1 vial into the lungs 4 times daily      furosemide (LASIX) 40 MG tablet Take 40 mg by mouth daily      turmeric 500 MG CAPS Take 500 mg by mouth daily      vitamin B-6 (PYRIDOXINE) 100 MG tablet Take 100 mg by mouth daily      amLODIPine (NORVASC) 5 MG tablet Take 5 mg by mouth daily as needed (BP > 140/90 at 10AM check)      sildenafil (REVATIO) 20 MG tablet Take 20 mg by mouth daily For dementia prevention      calcium-vitamin D (OSCAL-500) 500-200 MG-UNIT per tablet Take 1 tablet by mouth daily      vitamin B-12 (CYANOCOBALAMIN) 1000 MCG tablet Take 1,000 mcg by mouth daily      testosterone enanthate (DELATESTRYL) 200 MG/ML injection Inject 100 mg into the muscle every 14 days.  simvastatin (ZOCOR) 20 MG tablet Take 10 mg by mouth nightly  30 tablet     finasteride (PROSCAR) 5 MG tablet Take 5 mg by mouth daily       warfarin (COUMADIN) 5 MG tablet Take 5 mg by mouth nightly  (Patient not taking: Reported on 6/1/2022) 30 tablet      No current facility-administered medications on file prior to visit. Allergies:   Allergies   Allergen Reactions    Allopurinol Other (See Comments)     Sun sensitivity  Sun poisoning      Penicillins Anaphylaxis     Bronchial spasms    Ciprofloxacin Hives    Iodides     Sulfa Antibiotics Hives    Sulfamethoxazole-Trimethoprim     Cephalosporins Rash       REVIEW OF SYSTEMS:    CONSTITUTIONAL: Negative for fevers and chills  HEENT: Negative for oropharyngeal exudate, post nasal drip, sinus pain / pressure, nasal congestion, ear pain  RESPIRATORY:  See HPI  CARDIOVASCULAR: Negative for chest pain, palpitations, edema  GASTROINTESTINAL: Negative for nausea, vomiting, diarrhea, constipation and abdominal pain  HEMATOLOGICAL: Negative for adenopathy  SKIN: Negative for clubbing, cyanosis, skin lesions  EXTREMITIES: Positive for weakness, but negative for decreased ROM  NEUROLOGICAL: Negative for unilateral weakness, speech or gait abnormalities  PSYCH: Negative for anxiety, depression    Objective:   PHYSICAL EXAM:        VITALS:  /76 (Site: Left Upper Arm, Position: Sitting, Cuff Size: Medium Adult)   Pulse 63   Resp 16   Ht 6' (1.829 m)   Wt 188 lb (85.3 kg)   SpO2 99% Comment: on 5L  BMI 25.50 kg/m²     CONSTITUTIONAL:  Awake, alert, cooperative, no apparent distress, and appears stated age  HEENT: No oropharyngeal exudate, PERRL, no cervical adenopathy, no tracheal deviation, thyroid size normal  LUNGS:  No increased work of breathing. Inspiratory crackles heard throughout the lung fields posteriorly and some anteriorly. CARDIOVASCULAR:  normal S1 and S2 and no JVD  ABDOMEN:  Normal bowel sounds, non-distended and non-tender to palpation  EXT: No edema, no calf tenderness. Pulses are present bilaterally. NEUROLOGIC:  Mental Status Exam:  Level of Alertness:   awake  Orientation:   person, place, time. SKIN:  normal skin color, texture, turgor, no redness, warmth, or swelling     DATA:      Radiology Review:  Pertinent images / reports were reviewed as a part of this visit. Impression       1. Chronic diffuse bronchiectasis and interstitial lung disease and pulmonary fibrosis  , as described above without diffuse segmental pneumonia. Areas of pleural and subpleural fibrotic changes and reticulonodular scarring are noted, greater in the    right lung. 2. No pleural effusion. Pacemaker wires evident with atherosclerotic disease of the left anterior descending coronary artery   3.  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, measuring approximate 4.1 x 4.2 cm       Last PFTs: 2015, normal    Immunizations:   Immunization History Administered Date(s) Administered    COVID-19, Moderna, Primary or Immunocompromised, PF, 100mcg/0.5mL 01/07/2021, 02/04/2021, 10/28/2021    Influenza A (E3S1-89) Vaccine PF IM 12/24/2009    Influenza Virus Vaccine 12/24/2009, 09/26/2013, 09/22/2014, 10/25/2018, 03/06/2020, 10/02/2020    Influenza, High Dose (Fluzone 65 yrs and older) 10/14/2015, 10/09/2017    Pneumococcal Conjugate 13-valent ([id number]) 03/06/2015, 10/14/2015    Pneumococcal Polysaccharide ([id number]) 01/01/1996    Td (Adult), 2 Lf Tetanus Toxoid, Pf (Td, Absorbed) 07/25/2008    Tdap (Boostrix, Adacel) 07/27/2012, 12/14/2015    Zoster Live (Zostavax) 02/24/2010       Assessment: This is a 80 y.o. male with IPF and acute hypoxemic respiratory failure    Plan:   -Patient's age makes IPF the most likely etiology, but he's never had a biopsy. CT chest shows clear progression of disease, but for IPF the progression is slow so it raises the question whether there is a different etiology. We had previously discussed treating him with Ofev or Esbriet but he was not interested after I mentioned the potential GI side effects and that it only slows the disease by few months. We discussed it again with his daughter and she's going to look into it, to help decide if they do want to try it after all. Since he has had evidence of fibrotic lung disease for over 7 years I do not think there would be much clinical benefit to these medications at this point. Patient has re-enrolled in hospice. I encouraged him to continue his daily exercises as he can tolerate them. Patient is back on 2L of oxygen continuously. I recommended he use a pulse oximeter to see if his light headedness is associated with low oxygen. I also noted that the progression of his ILD is predominantly on the right side, raising the question of possible aspiration issues.   Will start him on a PPI and see if his cough and/or dyspnea improves on acid suppression. Time spent with patient, reviewing films etc. 55 minutes    - Tobacco use: The patient is not currently smoking and quit over 60 years ago. - RTC 2 months w/ MD. Call or RTC sooner if symptoms persist or worsen acutely.

## 2022-08-01 ENCOUNTER — OFFICE VISIT (OUTPATIENT)
Dept: PULMONOLOGY | Age: 87
End: 2022-08-01
Payer: MEDICARE

## 2022-08-01 VITALS
WEIGHT: 190 LBS | TEMPERATURE: 97.3 F | SYSTOLIC BLOOD PRESSURE: 131 MMHG | HEIGHT: 72 IN | OXYGEN SATURATION: 94 % | DIASTOLIC BLOOD PRESSURE: 81 MMHG | BODY MASS INDEX: 25.73 KG/M2 | RESPIRATION RATE: 16 BRPM | HEART RATE: 60 BPM

## 2022-08-01 DIAGNOSIS — J96.11 CHRONIC RESPIRATORY FAILURE WITH HYPOXIA (HCC): ICD-10-CM

## 2022-08-01 DIAGNOSIS — J84.112 IPF (IDIOPATHIC PULMONARY FIBROSIS) (HCC): Primary | ICD-10-CM

## 2022-08-01 PROCEDURE — 1123F ACP DISCUSS/DSCN MKR DOCD: CPT | Performed by: INTERNAL MEDICINE

## 2022-08-01 PROCEDURE — 99213 OFFICE O/P EST LOW 20 MIN: CPT | Performed by: INTERNAL MEDICINE

## 2022-08-01 NOTE — PROGRESS NOTES
[facility]    Outpatient follow-up note    Subjective:   Referring Physician: Dang Guillaume / HPI:     The patient is 80 y.o. male who presents today for a follow-up visit for interstitial lung disease and hypoxia. Patient comes in today complaining of similar shortness of breath and dry cough as he was last time. He started taking the PPI as instructed but did not remember what it was for and cannot say that he noted much difference. He has been using a nebulizer every 4 hours but does not note that it helps and was wondering if it is okay if he misses a dose as he has missed several doses today and does not know how he could do for in a row. 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 Patient has been having more cough, shortness of breath and light headedness over the past month. He's on a burst and taper of steroids. His symptoms are roughly the same, but possibly improved on the steroids. He had a CT chest when he was in the ED and he's had a lot of progression of his ILD/IPF. He remains in hospice. 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 He says he does not need it anymore, but he does wear it at night. He was satting 89% when he presented on May 13 but is satting 98% today. He said he had a urinary tract infection soon after seeing me in the office that really laid him flat and it has taken a while to get built back up again. He says his strength and endurance are still not back to where they were before. His cough is resolved however. Initial visit: Patient was seen in this office many years ago for an abnormal PFT. He had a low forced vital capacity at that time but normal total lung capacity and diffusion.   In looking at my notes from 2015 he did have some crackles on exam and I suspected an interstitial lung disease. 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 He reports that he had been breathing fine and exercising regularly until things started getting really bad about 3 weeks ago. He comes in with a family friend whose   of IPF. Patient also sees a cardiologist through [facility] and in reviewing his notes mention that he was having difficulty with memory and that the shortness of breath symptoms may have been going on for a longer period of time. Patient's oxygen saturations on coming to the room were 89%. Patient had a CT of the abdomen done in February of this year that showed progression of basilar interstitial changes and fibrosis compared to the scan from . Patient did not recall having the CT scan. Patient also complains of a dry nonproductive cough       Past Medical History:    Past Medical History:   Diagnosis Date    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 Atrial fibrillation ([address]     on event monitor    BPH (benign prostatic hyperplasia)     Gout     HTN (hypertension)     Hyperlipidemia     Hypothyroidism     Kidney stones     Pacemaker        Social History:    Social History     Tobacco Use   Smoking Status Former    Packs/day: 1.00    Years: 10.00    Pack years: 10.00    Types: Cigarettes    Quit date: 1963    Years since quittin.6   Smokeless Tobacco Never       Family History:  Family History   Problem Relation Age of Onset    Other Sister         Rheumatic fever     Current Medications:  Current Outpatient Medications on File Prior to Visit   Medication Sig Dispense Refill    XARELTO 20 MG TABS tablet       omeprazole (PRILOSEC) 20 MG delayed release capsule Take 1 capsule by mouth every evening 90 capsule 1    doxycycline hyclate (VIBRA-TABS) 100 MG tablet Take 100 mg by mouth 2 times daily      predniSONE (DELTASONE) 10 MG tablet Take 4 tablets by mouth for 5 days.  20 tablet 0 diphenhydrAMINE-APAP, sleep, (TYLENOL PM EXTRA STRENGTH)  MG tablet Take 2 tablets by mouth nightly      folic acid (FOLVITE) 1 MG tablet Take 1 mg by mouth daily      vitamin D (CHOLECALCIFEROL) 25 MCG (1000 UT) TABS tablet Take 2,000 Units by mouth daily      coenzyme Q10 100 MG CAPS capsule Take 100 mg by mouth daily      levothyroxine (SYNTHROID) 112 MCG tablet Take 112 mcg by mouth every morning (before breakfast)      temazepam (RESTORIL) 15 MG capsule Take 15 mg by mouth nightly. latanoprost (XALATAN) 0.005 % ophthalmic solution Place 1 drop into both eyes nightly      tamsulosin (FLOMAX) 0.4 MG capsule Take 0.4 mg by mouth daily      ipratropium-albuterol (DUONEB) 0.5-2.5 (3) MG/3ML SOLN nebulizer solution Inhale 1 vial into the lungs 4 times daily      furosemide (LASIX) 40 MG tablet Take 40 mg by mouth daily      turmeric 500 MG CAPS Take 500 mg by mouth daily      vitamin B-6 (PYRIDOXINE) 100 MG tablet Take 100 mg by mouth daily      amLODIPine (NORVASC) 5 MG tablet Take 5 mg by mouth daily as needed (BP > 140/90 at 10AM check)      sildenafil (REVATIO) 20 MG tablet Take 20 mg by mouth daily For dementia prevention      calcium-vitamin D (OSCAL-500) 500-200 MG-UNIT per tablet Take 1 tablet by mouth daily      vitamin B-12 (CYANOCOBALAMIN) 1000 MCG tablet Take 1,000 mcg by mouth daily      testosterone enanthate (DELATESTRYL) 200 MG/ML injection Inject 100 mg into the muscle every 14 days. simvastatin (ZOCOR) 20 MG tablet Take 10 mg by mouth nightly  30 tablet     finasteride (PROSCAR) 5 MG tablet Take 5 mg by mouth daily        No current facility-administered medications on file prior to visit. Allergies:   Allergies   Allergen Reactions    Allopurinol Other (See Comments)     Sun sensitivity  Sun poisoning      Penicillins Anaphylaxis     Bronchial spasms    Ciprofloxacin Hives    Iodides     Sulfa Antibiotics Hives    Sulfamethoxazole-Trimethoprim     Cephalosporins Rash REVIEW OF SYSTEMS:    CONSTITUTIONAL: Negative for fevers and chills  HEENT: Negative for oropharyngeal exudate, post nasal drip, sinus pain / pressure, nasal congestion, ear pain  RESPIRATORY:  See HPI  CARDIOVASCULAR: Negative for chest pain, palpitations, edema  GASTROINTESTINAL: Negative for nausea, vomiting, diarrhea, constipation and abdominal pain  HEMATOLOGICAL: Negative for adenopathy  SKIN: Negative for clubbing, cyanosis, skin lesions  EXTREMITIES: Positive for weakness, but negative for decreased ROM  NEUROLOGICAL: Negative for unilateral weakness, speech or gait abnormalities  PSYCH: Negative for anxiety, depression    Objective:   PHYSICAL EXAM:        VITALS:  /81 (Site: Left Upper Arm, Position: Sitting, Cuff Size: Medium Adult)   Pulse 60   Temp 97.3 °F (36.3 °C) (Infrared)   Resp 16   Ht 6' (1.829 m)   Wt 190 lb (86.2 kg)   SpO2 94%   BMI 25.77 kg/m²     CONSTITUTIONAL:  Awake, alert, cooperative, no apparent distress, and appears stated age  HEENT: No oropharyngeal exudate, PERRL, no cervical adenopathy, no tracheal deviation, thyroid size normal  LUNGS:  No increased work of breathing. Inspiratory crackles heard throughout the lung fields posteriorly and some anteriorly. CARDIOVASCULAR:  normal S1 and S2 and no JVD  ABDOMEN:  Normal bowel sounds, non-distended and non-tender to palpation  EXT: No edema, no calf tenderness. Pulses are present bilaterally. NEUROLOGIC:  Mental Status Exam:  Level of Alertness:   awake  Orientation:   person, place, time. SKIN:  normal skin color, texture, turgor, no redness, warmth, or swelling     DATA:      Radiology Review:  Pertinent images / reports were reviewed as a part of this visit. Impression       1. Chronic diffuse bronchiectasis and interstitial lung disease and pulmonary fibrosis  , as described above without diffuse segmental pneumonia.  Areas of pleural and subpleural fibrotic changes and reticulonodular scarring are noted, greater in the    right lung. 2. No pleural effusion. Pacemaker wires evident with atherosclerotic disease of the left anterior descending coronary artery   3. 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, measuring approximate 4.1 x 4.2 cm       Last PFTs: 2015, normal    Immunizations:   Immunization History   Administered Date(s) Administered    COVID-19, MODERNA BLUE border, Primary or Immunocompromised, (age 12y+), IM, 100 mcg/0.5mL 01/07/2021, 02/04/2021, 10/28/2021    Influenza A (N2N2-59) Vaccine PF IM 12/24/2009    Influenza Virus Vaccine 12/24/2009, 09/26/2013, 09/22/2014, 10/25/2018, 03/06/2020, 10/02/2020    Influenza, High Dose (Fluzone 65 yrs and older) 10/14/2015, 10/09/2017    Pneumococcal Conjugate 13-valent ([id number]) 03/06/2015, 10/14/2015    Pneumococcal Polysaccharide ([id number]) 01/01/1996    Td (Adult), 2 Lf Tetanus Toxoid, Pf (Td, Absorbed) 07/25/2008    Tdap (Boostrix, Adacel) 07/27/2012, 12/14/2015    Zoster Live (Zostavax) 02/24/2010       Assessment: This is a 80 y.o. male with IPF and acute hypoxemic respiratory failure    Plan:   -Patient's age makes IPF the most likely etiology, but he's never had a biopsy and his progression has been slow. .      We had previously discussed treating him with Ofev or Linda Brandon but he was not interested after I mentioned the potential GI side effects and that it only slows the disease by few months. We discussed it again, last visit, with his daughter and she was going to look into it, to help decide if they do want to try it after all. He did not ask about it today. Since he has had evidence of fibrotic lung disease for over 7 years I do not think there would be much clinical benefit to these medications at this point. Patient was saturating well today and is on room air. He does wear oxygen at night.   I previously noted that the progression of his ILD is predominantly on the right side, raising the question of possible aspiration issues. I started him on a PPI and see if his cough and/or dyspnea improves on acid suppression. He has not noticed any substantial difference but he did not know to be looking for it. No changes in management today, but I am going to keep the interval between visits short as patient does have some memory issues, specifically short-term. - Tobacco use: The patient is not currently smoking and quit over 60 years ago. - RTC 3 months w/ MD. Call or RTC sooner if symptoms persist or worsen acutely.

## 2022-09-19 RX ORDER — OMEPRAZOLE 20 MG/1
20 CAPSULE, DELAYED RELEASE ORAL EVERY EVENING
Qty: 90 CAPSULE | Refills: 1 | Status: SHIPPED | OUTPATIENT
Start: 2022-09-19

## 2022-11-14 ENCOUNTER — OFFICE VISIT (OUTPATIENT)
Dept: PULMONOLOGY | Age: 87
End: 2022-11-14
Payer: MEDICARE

## 2022-11-14 VITALS
WEIGHT: 190 LBS | SYSTOLIC BLOOD PRESSURE: 155 MMHG | HEART RATE: 68 BPM | HEIGHT: 72 IN | OXYGEN SATURATION: 97 % | DIASTOLIC BLOOD PRESSURE: 84 MMHG | BODY MASS INDEX: 25.73 KG/M2

## 2022-11-14 DIAGNOSIS — J84.112 IPF (IDIOPATHIC PULMONARY FIBROSIS) (HCC): Primary | ICD-10-CM

## 2022-11-14 DIAGNOSIS — J96.11 CHRONIC RESPIRATORY FAILURE WITH HYPOXIA (HCC): ICD-10-CM

## 2022-11-14 PROCEDURE — 1123F ACP DISCUSS/DSCN MKR DOCD: CPT | Performed by: INTERNAL MEDICINE

## 2022-11-14 PROCEDURE — 99213 OFFICE O/P EST LOW 20 MIN: CPT | Performed by: INTERNAL MEDICINE

## 2022-11-14 NOTE — PROGRESS NOTES
[facility]    Outpatient follow-up note    Subjective:   Referring Physician: Kaitlin Terrazas / HPI:     The patient is 80 y.o. male who presents today for a follow-up visit for interstitial lung disease and hypoxia. Patient came in today wearing a bright red [employer] He complains of his usual shortness of breath but says that he continues to workout on a regular basis. His biggest complaint was that he gets [place]. \"    Interval history:  patient comes in today complaining of similar shortness of breath and dry cough as he was last time. He started taking the PPI as instructed but did not remember what it was for and cannot say that he noted much difference. He has been using a nebulizer every 4 hours but does not note that it helps and was wondering if it is okay if he misses a dose as he has missed several doses today and does not know how he could do for in a row. 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 Patient has been having more cough, shortness of breath and light headedness over the past month. He's on a burst and taper of steroids. His symptoms are roughly the same, but possibly improved on the steroids. He had a CT chest when he was in the ED and he's had a lot of progression of his ILD/IPF. He remains in hospice. 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 He says he does not need it anymore, but he does wear it at night. He was satting 89% when he presented on May 13 but is satting 98% today. He said he had a urinary tract infection soon after seeing me in the office that really laid him flat and it has taken a while to get built back up again. He says his strength and endurance are still not back to where they were before. His cough is resolved however.     Initial visit: Patient was seen in this office many years ago for an abnormal PFT. He had a low forced vital capacity at that time but normal total lung capacity and diffusion. In looking at my notes from  he did have some crackles on exam and I suspected an interstitial lung disease. 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 He reports that he had been breathing fine and exercising regularly until things started getting really bad about 3 weeks ago. He comes in with a family friend whose   of IPF. Patient also sees a cardiologist through [facility] and in reviewing his notes mention that he was having difficulty with memory and that the shortness of breath symptoms may have been going on for a longer period of time. Patient's oxygen saturations on coming to the room were 89%. Patient had a CT of the abdomen done in February of this year that showed progression of basilar interstitial changes and fibrosis compared to the scan from . Patient did not recall having the CT scan. Patient also complains of a dry nonproductive cough       Past Medical History:    Past Medical History:   Diagnosis Date    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      Atrial fibrillation ([address]     on event monitor    BPH (benign prostatic hyperplasia)     Gout     HTN (hypertension)     Hyperlipidemia     Hypothyroidism     Kidney stones     Pacemaker 2006       Social History:    Social History     Tobacco Use   Smoking Status Former    Packs/day: 1.00    Years: 10.00    Pack years: 10.00    Types: Cigarettes    Quit date: 1963    Years since quittin.9   Smokeless Tobacco Never       Family History:  Family History   Problem Relation Age of Onset    Other Sister         Rheumatic fever     Current Medications:  Current Outpatient Medications on File Prior to Visit   Medication Sig Dispense Refill    omeprazole (PRILOSEC) 20 MG delayed release capsule TAKE 1 CAPSULE BY MOUTH EVERY EVENING 90 capsule 1    XARELTO 20 MG TABS tablet       doxycycline hyclate (VIBRA-TABS) 100 MG tablet Take 100 mg by mouth 2 times daily      predniSONE (DELTASONE) 10 MG tablet Take 4 tablets by mouth for 5 days. 20 tablet 0    diphenhydrAMINE-APAP, sleep, (TYLENOL PM EXTRA STRENGTH)  MG tablet Take 2 tablets by mouth nightly      folic acid (FOLVITE) 1 MG tablet Take 1 mg by mouth daily      vitamin D (CHOLECALCIFEROL) 25 MCG (1000 UT) TABS tablet Take 2,000 Units by mouth daily      coenzyme Q10 100 MG CAPS capsule Take 100 mg by mouth daily      levothyroxine (SYNTHROID) 112 MCG tablet Take 112 mcg by mouth every morning (before breakfast)      temazepam (RESTORIL) 15 MG capsule Take 15 mg by mouth nightly. latanoprost (XALATAN) 0.005 % ophthalmic solution Place 1 drop into both eyes nightly      tamsulosin (FLOMAX) 0.4 MG capsule Take 0.4 mg by mouth daily      ipratropium-albuterol (DUONEB) 0.5-2.5 (3) MG/3ML SOLN nebulizer solution Inhale 1 vial into the lungs 4 times daily      furosemide (LASIX) 40 MG tablet Take 40 mg by mouth daily      turmeric 500 MG CAPS Take 500 mg by mouth daily      vitamin B-6 (PYRIDOXINE) 100 MG tablet Take 100 mg by mouth daily      amLODIPine (NORVASC) 5 MG tablet Take 5 mg by mouth daily as needed (BP > 140/90 at 10AM check)      sildenafil (REVATIO) 20 MG tablet Take 20 mg by mouth daily For dementia prevention      calcium-vitamin D (OSCAL-500) 500-200 MG-UNIT per tablet Take 1 tablet by mouth daily      vitamin B-12 (CYANOCOBALAMIN) 1000 MCG tablet Take 1,000 mcg by mouth daily      testosterone enanthate (DELATESTRYL) 200 MG/ML injection Inject 100 mg into the muscle every 14 days. simvastatin (ZOCOR) 20 MG tablet Take 10 mg by mouth nightly  30 tablet     finasteride (PROSCAR) 5 MG tablet Take 5 mg by mouth daily        No current facility-administered medications on file prior to visit. Allergies:   Allergies   Allergen Reactions    Allopurinol Other (See Comments)     Sun sensitivity  Sun poisoning      Penicillins Anaphylaxis     Bronchial spasms    Ciprofloxacin Hives    Iodides     Sulfa Antibiotics Hives    Sulfamethoxazole-Trimethoprim     Cephalosporins Rash       REVIEW OF SYSTEMS:    CONSTITUTIONAL: Negative for fevers and chills  HEENT: Negative for oropharyngeal exudate, post nasal drip, sinus pain / pressure, nasal congestion, ear pain  RESPIRATORY:  See HPI  CARDIOVASCULAR: Negative for chest pain, palpitations, edema  GASTROINTESTINAL: Negative for nausea, vomiting, diarrhea, constipation and abdominal pain  HEMATOLOGICAL: Negative for adenopathy  SKIN: Negative for clubbing, cyanosis, skin lesions  EXTREMITIES: Positive for weakness, but negative for decreased ROM  NEUROLOGICAL: Negative for unilateral weakness, speech or gait abnormalities  PSYCH: Negative for anxiety, depression    Objective:   PHYSICAL EXAM:        VITALS:  BP (!) 155/84 (Site: Left Upper Arm, Position: Sitting, Cuff Size: Medium Adult)   Pulse 68   Ht 6' (1.829 m)   Wt 190 lb (86.2 kg)   SpO2 97%   BMI 25.77 kg/m²     CONSTITUTIONAL:  Awake, alert, cooperative, no apparent distress, and appears stated age  HEENT: No oropharyngeal exudate, PERRL, no cervical adenopathy, no tracheal deviation, thyroid size normal  LUNGS:  No increased work of breathing. Diffuse inspiratory crackles heard throughout the lung fields posteriorly and anteriorly. CARDIOVASCULAR:  normal S1 and S2 and no JVD  ABDOMEN:  Normal bowel sounds, non-distended and non-tender to palpation  EXT: No edema, no calf tenderness. Pulses are present bilaterally. NEUROLOGIC:  Mental Status Exam:  Level of Alertness:   awake  Orientation:   person, place, time. SKIN:  normal skin color, texture, turgor, no redness, warmth, or swelling     DATA:      Radiology Review:  Pertinent images / reports were reviewed as a part of this visit. Impression       1.  Chronic diffuse bronchiectasis and interstitial lung disease and pulmonary fibrosis  , as described above without diffuse segmental pneumonia. Areas of pleural and subpleural fibrotic changes and reticulonodular scarring are noted, greater in the    right lung. 2. No pleural effusion. Pacemaker wires evident with atherosclerotic disease of the left anterior descending coronary artery   3. 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, measuring approximate 4.1 x 4.2 cm       Last PFTs: 2015, normal    Immunizations:   Immunization History   Administered Date(s) Administered    COVID-19, MODERNA BLUE border, Primary or Immunocompromised, (age 12y+), IM, 100 mcg/0.5mL 01/07/2021, 02/04/2021, 10/28/2021, 04/07/2022    COVID-19, PFIZER Bivalent BOOSTER, (age 12y+), IM, 30 mcg/0.3 mL dose 10/10/2022    Influenza A ([id number]) Vaccine PF IM 12/24/2009    Influenza Virus Vaccine 12/24/2009, 09/26/2013, 09/22/2014, 10/25/2018, 03/06/2020, 10/02/2020    Influenza, FLUZONE (age 72 y+), High Dose, 0.7mL 10/19/2021, 10/10/2022    Influenza, High Dose (Fluzone 65 yrs and older) 10/14/2015, 10/09/2017    Pneumococcal Conjugate 13-valent (Ehsvsus92) 03/06/2015, 10/14/2015    Pneumococcal Polysaccharide ([id number]) 01/01/1996    Td (Adult), 2 Lf Tetanus Toxoid, Pf (Td, Absorbed) 07/25/2008    Tdap (Boostrix, Adacel) 07/27/2012, 12/14/2015    Zoster Live (Zostavax) 02/24/2010    Zoster Recombinant (Shingrix) 10/19/2021       Assessment: This is a 80 y.o. male with IPF and acute hypoxemic respiratory failure    Plan:   -Patient's age makes IPF the most likely etiology, but he's never had a biopsy and his progression has been slow. We had previously discussed treating him with Ofev or Esbriet but he was not interested after I mentioned the potential GI side effects and that it only slows the disease by few months. At a previous visit his daughter said she was going to look into it, to help decide if they do want to try it after all.   He did not ask about it today.    Since he has had evidence of fibrotic lung disease for over 8 years I do not think there would be much clinical benefit to these medications at this point. Patient was saturating well today and is on room air. He does wear oxygen at night. I previously noted that the progression of his ILD is predominantly on the right side, raising the question of possible aspiration issues. He is on a PPI that I prescribed, continue. - Tobacco use: The patient is not currently smoking and quit over 60 years ago. - RTC 6 months w/ MD. Call or RTC sooner if symptoms persist or worsen acutely.

## 2023-01-03 ENCOUNTER — TELEPHONE (OUTPATIENT)
Dept: PULMONOLOGY | Age: 88
End: 2023-01-03

## 2023-01-03 NOTE — TELEPHONE ENCOUNTER
C/o continuing cough, productive of white substance. Coughing during phone call. Using nebulizer. Please call him at [phone]    [facility]: [facility] [place] on [address]

## 2023-01-03 NOTE — TELEPHONE ENCOUNTER
Spoke to Ed. Cough had resolved, but he has a script for doxy from his PCP.   He's going to hold onto the script and if the cough comes back, take the doxy Chronic diastolic congestive heart failure

## 2023-01-11 ENCOUNTER — TELEPHONE (OUTPATIENT)
Dept: PULMONOLOGY | Age: 88
End: 2023-01-11

## 2023-01-11 NOTE — TELEPHONE ENCOUNTER
Patient calls asking to speak to Dr Ashli Melvin. He is complaining of a cough and wheezing.   Please call [phone] or [phone](IDQUZO)

## 2023-01-19 ENCOUNTER — TELEPHONE (OUTPATIENT)
Dept: PULMONOLOGY | Age: 88
End: 2023-01-19

## 2023-01-19 RX ORDER — PREDNISONE 10 MG/1
TABLET ORAL
Qty: 20 TABLET | Refills: 0 | Status: SHIPPED | OUTPATIENT
Start: 2023-01-19

## 2023-01-19 NOTE — TELEPHONE ENCOUNTER
Ed has had a waxing and waning cough for the past few weeks. This is the third time he's called, but the first time the cough has not resolved on itself in a few hours. He had some violent coughing on the phone and sounded dry nonproductive. It does not sound like the cough of patient with interstitial lung disease but more of a bronchitis. It is much as I can tell over the phone. He is currently on azithromycin after completing a course of doxycycline last week. I wrote a 5-day burst of 40 mg prednisone see if that helps quell his cough and get him back to his baseline. Orders Placed This Encounter   Medications    predniSONE (DELTASONE) 10 MG tablet     Sig: Take 4 tablets by mouth for 5 days.      Dispense:  20 tablet     Refill:  0

## 2023-01-19 NOTE — TELEPHONE ENCOUNTER
Sxs: dry cough, chest pain     Cough? Yes      Productive? Yes became worse today     Color of Mucus? SOB? Nasal Congestion? Runny Nose/Color? Sore Throat? Fever? Last recorded temp? OTC Meds Tried? Taking routine pulmonary meds?  Yes     Pharmacy: [facility]  [place]      [address] #:  [phone]

## 2023-01-23 ENCOUNTER — TELEPHONE (OUTPATIENT)
Dept: PULMONOLOGY | Age: 88
End: 2023-01-23

## 2023-01-23 DIAGNOSIS — J84.9 ILD (INTERSTITIAL LUNG DISEASE) (HCC): Primary | ICD-10-CM

## 2023-01-23 NOTE — TELEPHONE ENCOUNTER
Patient has finished all of medications that was given to him. He is still continuing to have brownish green phlegm.    He would like for Dr Yung Jaffe to call him at [phone] Solaraze Counseling:  I discussed with the patient the risks of Solaraze including but not limited to erythema, scaling, itching, weeping, crusting, and pain.

## 2023-01-23 NOTE — TELEPHONE ENCOUNTER
I called Dr. Ne Hughes right after the second call and got his voicemail. He is already had 2 rounds of antibiotics through his PCP is not better so I think we need to evaluate for other infectious organisms or progression of his ILD. I am ordering a high-resolution CT scan and a respiratory culture. I had to leave a voicemail to tell him these instructions were coming. If he is agreeable to them we need to get him the instructions on how to get them done. Diagnosis Orders   1.  ILD (interstitial lung disease) ([facility] [address]  Culture, Respiratory    CT CHEST HIGH RESOLUTION

## 2023-01-24 ENCOUNTER — TELEPHONE (OUTPATIENT)
Dept: PULMONOLOGY | Age: 88
End: 2023-01-24

## 2023-01-24 ENCOUNTER — HOSPITAL ENCOUNTER (OUTPATIENT)
Dept: CT IMAGING | Age: 88
Discharge: HOME | End: 2023-01-24

## 2023-01-24 DIAGNOSIS — J84.9 ILD (INTERSTITIAL LUNG DISEASE) (HCC): ICD-10-CM

## 2023-01-24 PROCEDURE — 71250 CT THORAX DX C-: CPT

## 2023-01-24 NOTE — TELEPHONE ENCOUNTER
Patient called back, I advised him that Dr Kit Magaña tried to call him back but got his vm. Patient said, \"I was not at home to answer\" I verified that the # he gave us each time he called was the home #. He said he left the house yesterday and Dr Kit Magaña can call him on his cell phone today. I explained that Dr Kit Magaña was at the hospital doing procedures all day. He said he is producing more dark brown sputum and his is becoming worried. I advised that 2 orders were placed one for a culture & CT. Gave him number to call to schedule. I did offer that he go to the ED if he is that concerned/worried. He said it was not that bad. He did not call his primary doctor because \"they just say go to the ED\".

## 2023-02-01 ENCOUNTER — TELEPHONE (OUTPATIENT)
Dept: PULMONOLOGY | Age: 88
End: 2023-02-01

## 2023-02-01 NOTE — TELEPHONE ENCOUNTER
If I'd known that I would have called him about the CT earlier. I was waiting on the sputum results as his CT showed nothing new. I'll call him.

## 2023-02-01 NOTE — TELEPHONE ENCOUNTER
Called to get results of his CT scan. I did ask if he had sputum culture done, he said \"No, I don't have sputum anymore, so there is no reason to get tested but. I need CT results\". Please call him when you get a chance.

## 2023-02-02 NOTE — TELEPHONE ENCOUNTER
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 He appears to be back to his normal.  He didn't get the sputum culture done because the sputum stopped. I looked at his CT and it's stable to slightly improved relative to last May when he did have an infection. He says his big challenge now is keeping his oxygen levels up. He says they drop to 91% and he feels woozy, so he puts on oxygen. He says he feels funny any time it's below 95%. I have no physiologic explanation for these symptoms. A sat of 91% isn't heather, but it's certainly adequate.

## 2023-02-07 ENCOUNTER — TELEPHONE (OUTPATIENT)
Dept: PULMONOLOGY | Age: 88
End: 2023-02-07

## 2023-02-07 DIAGNOSIS — J84.9 ILD (INTERSTITIAL LUNG DISEASE) (HCC): ICD-10-CM

## 2023-02-07 NOTE — TELEPHONE ENCOUNTER
Patient called and said he was finally able to spit up some \"saliva\" and would like to know address to the lab and if order is still in chart. I gave him address and advised the order was still in his chart. He will go there today.

## 2023-02-08 ENCOUNTER — TELEPHONE (OUTPATIENT)
Dept: PULMONOLOGY | Age: 88
End: 2023-02-08

## 2023-02-08 NOTE — TELEPHONE ENCOUNTER
Ed completed respiratory culture.  Would like a callback with results  Callback [phone]  States it is ok to leave voicemail with results

## 2023-02-09 LAB
CULTURE, RESPIRATORY: NORMAL
GRAM STAIN RESULT: NORMAL

## 2023-02-09 RX ORDER — BENZONATATE 200 MG/1
200 CAPSULE ORAL 3 TIMES DAILY PRN
Qty: 30 CAPSULE | Refills: 0 | Status: SHIPPED | OUTPATIENT
Start: 2023-02-09 | End: 2023-02-16

## 2023-02-09 NOTE — TELEPHONE ENCOUNTER
Calling again today asking for results of sputum culture, he states that it showed that he needs medication, he thinks he needs to start it today. Please advise.

## 2023-02-09 NOTE — TELEPHONE ENCOUNTER
I tried to call Ed but it disconnected after 2 rings with no option for voicemail. I put in the script for benzoate/tessalon  His respiratory culture came back as normal respiratory jovan which does not warrant any treatment.     Orders Placed This Encounter   Medications    benzonatate (TESSALON) 200 MG capsule     Sig: Take 1 capsule by mouth 3 times daily as needed for Cough     Dispense:  30 capsule     Refill:  0

## 2023-02-10 NOTE — TELEPHONE ENCOUNTER
Dr. Pozo Backers results were relayed to patient, as well as info re: prescription sent. He was informed that his phone does not have ability to leave voicemail.

## 2023-02-22 ENCOUNTER — TELEPHONE (OUTPATIENT)
Dept: PULMONOLOGY | Age: 88
End: 2023-02-22

## 2023-02-22 RX ORDER — GUAIFENESIN AND DEXTROMETHORPHAN HYDROBROMIDE 1200; 60 MG/1; MG/1
1 TABLET, EXTENDED RELEASE ORAL 2 TIMES DAILY
Qty: 28 TABLET | Refills: 3 | Status: SHIPPED | OUTPATIENT
Start: 2023-02-22

## 2023-02-22 RX ORDER — DEXTROMETHORPHAN HYDROBROMIDE AND GUAIFENESIN 20; 400 MG/20ML; MG/20ML
20 SOLUTION ORAL EVERY 4 HOURS PRN
Qty: 840 ML | Refills: 2 | Status: SHIPPED | OUTPATIENT
Start: 2023-02-22

## 2023-02-22 RX ORDER — DEXTROMETHORPHAN HYDROBROMIDE 15 MG/1
2 TABLET ORAL EVERY 4 HOURS PRN
Qty: 120 TABLET | Refills: 2 | Status: SHIPPED | OUTPATIENT
Start: 2023-02-22

## 2023-02-22 NOTE — TELEPHONE ENCOUNTER
C/O worsening cough today. Coughing on phone. He would like a call. He can be reached at [phone].

## 2023-02-22 NOTE — TELEPHONE ENCOUNTER
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 Sounds like he just suffers through it most days. He's tried and failed antibiotics, steroids and tessalon perles. I recently got a high resolution CT and sputum culture that showed no progression of disease and normal respiratory jovan respectively. He's taking mucinex as well, but without a cough suppressant. I'm ordering him a variety of dextromethorphan options both with and without guaifenesin to help calm his cough. I think this is all related to his IPF. If this doesn't work, then the next step is narcotic cough suppression which I am reluctant to do with his age and desired activity level. Patient is also reluctant to try narcotics.     Orders Placed This Encounter   Medications    DISCONTD: Dextromethorphan-guaiFENesin  MG/5ML LIQD     Sig: Take 5 mLs by mouth every 6 hours     Dispense:  120 mL     Refill:  3    Dextromethorphan-guaiFENesin (MUCINEX DM MAXIMUM STRENGTH)  MG TB12     Sig: Take 1 tablet by mouth in the morning and at bedtime     Dispense:  28 tablet     Refill:  3    Dextromethorphan-guaiFENesin (MUCINEX FAST-MAX DM MAX) 5-100 MG/5ML LIQD     Sig: Take 20 mLs by mouth every 4 hours as needed (cough)     Dispense:  840 mL     Refill:  2    Dextromethorphan HBr (DELSYM) 15 MG TABS     Sig: Take 2 tablets by mouth every 4 hours as needed (cough)     Dispense:  120 tablet     Refill:  2

## 2023-02-23 NOTE — TELEPHONE ENCOUNTER
Please review attached Rx from [facility]. They do not carry capsules, gel capsules is requested. Please change if appropriate.  Thank you

## 2023-02-23 NOTE — TELEPHONE ENCOUNTER
Believe it or not, I can't see that Epic allows me to differentiate between a capsule or a gel capsule. Since these are really over the counter, can they just sell him the gel capsules instead?

## 2023-03-01 DIAGNOSIS — J84.9 ILD (INTERSTITIAL LUNG DISEASE) (HCC): Primary | ICD-10-CM

## 2023-03-01 DIAGNOSIS — R05.3 CHRONIC COUGH: ICD-10-CM

## 2023-03-01 DIAGNOSIS — R05.9 COUGH, UNSPECIFIED TYPE: ICD-10-CM

## 2023-03-01 NOTE — TELEPHONE ENCOUNTER
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 The amount of coughing fits may be reduced, but then \"it starts up again. I don't know how much it is really reduced. \"  He would like to see if he needs to wait longer, or is something else to be prescribed.    He can be reached at [phone]

## 2023-03-02 NOTE — TELEPHONE ENCOUNTER
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  If the dextromethorphan I ordered him isn't helping, then the next step would be a narcotic cough medicine with codeine.  He previously had been resistant to narcotics as am I in a 89 year old.  I asked him to call back if he wants me to make arrangements for that kind of treatment.    I pended an order for a codeine cough syrup.

## 2023-03-02 NOTE — TELEPHONE ENCOUNTER
Patient called again. He is still having coughing spells. Wants to know can you prescribed something.

## 2023-03-03 RX ORDER — HYDROCODONE POLISTIREX AND CHLORPHENIRAMINE POLISTIREX 10; 8 MG/5ML; MG/5ML
5 SUSPENSION, EXTENDED RELEASE ORAL EVERY 12 HOURS PRN
Qty: 300 ML | Refills: 0 | Status: SHIPPED | OUTPATIENT
Start: 2023-03-03 | End: 2023-04-02

## 2023-03-03 NOTE — TELEPHONE ENCOUNTER
Patient is agreeable to taking codeine cough syrup. He also, needs refill on cough tablet that was ordered in February.    Please send to [facility] in [place] [facility]

## 2023-03-06 RX ORDER — GUAIFENESIN AND DEXTROMETHORPHAN HYDROBROMIDE 1200; 60 MG/1; MG/1
1 TABLET, EXTENDED RELEASE ORAL 2 TIMES DAILY
Qty: 28 TABLET | Refills: 3 | Status: SHIPPED | OUTPATIENT
Start: 2023-03-06

## 2023-03-06 NOTE — TELEPHONE ENCOUNTER
Ed called back today asking  if rx for had been sent to [facility] for cough syrup. He also would like to have a refill for Mucinex, if appropriate, please sign order.

## 2023-04-22 ENCOUNTER — TELEPHONE (OUTPATIENT)
Dept: PULMONOLOGY | Age: 88
End: 2023-04-22

## 2023-04-22 NOTE — TELEPHONE ENCOUNTER
Lacho Decker, a health coordinator for Ed, called stating that his concentrator was malfunctioning. She does not know his oxygen supplier. He uses oxygen at night. Has diagnosis of suspected IPF. He is not having any new respiratory complaints. Review of our records show that he once was in hospice but is no longer. I also found that he did get oxygen through [facility]. I recommended that she call [facility] to see if they are still servicing his concentrator.

## 2023-04-24 ENCOUNTER — TELEPHONE (OUTPATIENT)
Dept: PULMONOLOGY | Age: 88
End: 2023-04-24

## 2023-05-04 RX ORDER — IPRATROPIUM BROMIDE AND ALBUTEROL SULFATE 2.5; .5 MG/3ML; MG/3ML
1 SOLUTION RESPIRATORY (INHALATION) EVERY 6 HOURS PRN
Qty: 360 ML | Refills: 3 | Status: SHIPPED | OUTPATIENT
Start: 2023-05-04

## 2023-05-22 ENCOUNTER — TELEPHONE (OUTPATIENT)
Dept: PULMONOLOGY | Age: 88
End: 2023-05-22

## 2023-05-22 RX ORDER — BENZONATATE 200 MG/1
200 CAPSULE ORAL 3 TIMES DAILY PRN
Qty: 30 CAPSULE | Refills: 0 | Status: SHIPPED | OUTPATIENT
Start: 2023-05-22

## 2023-05-22 NOTE — TELEPHONE ENCOUNTER
Can you address while Dr Sebastien Aguilar is working the ICU?     Patient called with complaints of having coughing fits lasting for at least 1 hour    Coughing up mucus- \"neutral color or light tan\"    Using nebulizer 6x day with no relief    [facility] [address] - P [phone] - F [phone]

## 2023-06-08 ENCOUNTER — TELEPHONE (OUTPATIENT)
Dept: PULMONOLOGY | Age: 88
End: 2023-06-08

## 2023-06-08 RX ORDER — PREDNISONE 20 MG/1
40 TABLET ORAL DAILY
Qty: 10 TABLET | Refills: 0 | Status: SHIPPED | OUTPATIENT
Start: 2023-06-08 | End: 2023-06-13

## 2023-06-08 RX ORDER — AZITHROMYCIN 250 MG/1
TABLET, FILM COATED ORAL
Qty: 6 TABLET | Refills: 0 | Status: SHIPPED | OUTPATIENT
Start: 2023-06-08

## 2023-06-08 NOTE — TELEPHONE ENCOUNTER
Please advise patient is calling in with the following     Sx:  -coughing   -unable to sleep through the night   -    Has patient taking any OTC medicine?  no      Has patient taking maintenance Medications? Delsym, Mucinex and nebulizer  Tessalon perles do not help     Duration of patients sxs? \"All the time\"    Pharmacy ?   [facility] # ?  [phone]

## 2023-06-08 NOTE — TELEPHONE ENCOUNTER
Spoke to Ed. He had a previous script for steroids and an antibiotic that one of my partners ordered a previous time he called for intractable cough. He only filled it today however, and took it 5 minutes before I called.

## 2023-06-08 NOTE — PROGRESS NOTES
Patient called complaining of cough. It is mostly dry, and recurrent. He was not able to sleep last night. He had several episodes similar to this before. He had some response with prednisone and azithromycin before.     [facility] to [facility] with prednisone burst and Zpak

## 2023-06-21 RX ORDER — OMEPRAZOLE 20 MG/1
20 CAPSULE, DELAYED RELEASE ORAL EVERY EVENING
Qty: 90 CAPSULE | Refills: 1 | Status: SHIPPED | OUTPATIENT
Start: 2023-06-21

## 2023-06-21 NOTE — TELEPHONE ENCOUNTER
If appropriate please refill pending medication     Last office visit : 11/14/2022   Next office visit :   Future Appointments   Date Time Provider Micheal Huizar   7/10/2023 12:40 PM Deena Orr MD [facility]       Thank you

## 2023-06-28 ENCOUNTER — TELEPHONE (OUTPATIENT)
Dept: PULMONOLOGY | Age: 88
End: 2023-06-28

## 2023-07-03 ENCOUNTER — TELEPHONE (OUTPATIENT)
Dept: INTERNAL MEDICINE CLINIC | Age: 88
End: 2023-07-03

## 2023-07-03 NOTE — TELEPHONE ENCOUNTER
----- Message from Norris Hart sent at 7/3/2023  2:13 PM EDT -----  Subject: Appointment Request    Reason for Call: New Patient/New to Provider Appointment needed: New   Patient Request Appointment    QUESTIONS    Reason for appointment request? Requested Provider unavailable - Yesica Chiang     Additional Information for Provider? Patient would like to establish with   Dr. Yesica Chiang because his daughter Traci Redmond is already a patient of his. Would he take him as a new patient? When could he come in?  Please advise.  ---------------------------------------------------------------------------  --------------  Dana Standing INFO  [phone]; OK to leave message on voicemail  ---------------------------------------------------------------------------  --------------  SCRIPT ANSWERS

## 2023-07-05 NOTE — TELEPHONE ENCOUNTER
The last 10 times he's called, the cough had resolved by the time I called him back. He has IPF, there will always be a cough, always. I see him in the office next week and we can address this.

## 2023-07-10 ENCOUNTER — OFFICE VISIT (OUTPATIENT)
Dept: PULMONOLOGY | Age: 88
End: 2023-07-10
Payer: MEDICARE

## 2023-07-10 VITALS
BODY MASS INDEX: 25.84 KG/M2 | OXYGEN SATURATION: 96 % | HEIGHT: 73 IN | SYSTOLIC BLOOD PRESSURE: 122 MMHG | HEART RATE: 72 BPM | RESPIRATION RATE: 16 BRPM | WEIGHT: 195 LBS | DIASTOLIC BLOOD PRESSURE: 80 MMHG

## 2023-07-10 DIAGNOSIS — J84.9 ILD (INTERSTITIAL LUNG DISEASE) (HCC): Primary | ICD-10-CM

## 2023-07-10 DIAGNOSIS — J47.9 BRONCHIECTASIS WITHOUT COMPLICATION (HCC): ICD-10-CM

## 2023-07-10 DIAGNOSIS — J96.11 CHRONIC RESPIRATORY FAILURE WITH HYPOXIA (HCC): ICD-10-CM

## 2023-07-10 DIAGNOSIS — J84.112 IPF (IDIOPATHIC PULMONARY FIBROSIS) (HCC): ICD-10-CM

## 2023-07-10 PROCEDURE — 1123F ACP DISCUSS/DSCN MKR DOCD: CPT | Performed by: INTERNAL MEDICINE

## 2023-07-10 PROCEDURE — 99214 OFFICE O/P EST MOD 30 MIN: CPT | Performed by: INTERNAL MEDICINE

## 2023-07-10 RX ORDER — SODIUM CHLORIDE FOR INHALATION 3 %
4 VIAL, NEBULIZER (ML) INHALATION 3 TIMES DAILY PRN
Qty: 1350 ML | Refills: 2 | Status: SHIPPED | OUTPATIENT
Start: 2023-07-10

## 2023-07-10 RX ORDER — ALBUTEROL SULFATE 1.25 MG/3ML
1 SOLUTION RESPIRATORY (INHALATION) 3 TIMES DAILY PRN
Qty: 90 EACH | Refills: 3 | Status: SHIPPED | OUTPATIENT
Start: 2023-07-10 | End: 2023-10-08

## 2023-07-10 NOTE — PROGRESS NOTES
Formerly [facility] Pulmonary and Critical Care    Outpatient follow-up note    Subjective:   Referring Physician: Devon Michel / HPI:     The patient is 80 y.o. male who presents today for a follow-up visit for interstitial lung disease and hypoxia. Ed comes in today stating that the past couple months have been really bad and terms of his cough. Looking through my notes in epic patient has had complaints of cough anywhere from 1-3 times a month since last visit. I even did a repeat high-resolution CT scan on him in January and a sputum culture. Many of the times that he called the cough would have resolved by the time I called him back. It appears he is having spasms of cough and it sounds like it is worse at night. He says he recently did an experiment where he forced himself to cough before going to bed, brought up a bunch of phlegm and he was able to sleep through the night. He says that predominantly the cough is productive but he does not ever cough it out of his mouth, he just swallows it. Interval history:  patient comes in today complaining of similar shortness of breath and dry cough as he was last time. He started taking the PPI as instructed but did not remember what it was for and cannot say that he noted much difference. He has been using a nebulizer every 4 hours but does not note that it helps and was wondering if it is okay if he misses a dose as he has missed several doses today and does not know how he could do for in a row. 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 Patient has been having more cough, shortness of breath and light headedness over the past month. He's on a burst and taper of steroids. His symptoms are roughly the same, but possibly improved on the steroids. He had a CT chest when he was in the ED and he's had a lot of progression of his ILD/IPF.   He remains in

## 2023-07-11 ENCOUNTER — TELEPHONE (OUTPATIENT)
Dept: PULMONOLOGY | Age: 88
End: 2023-07-11

## 2023-07-11 NOTE — TELEPHONE ENCOUNTER
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 States he has been \"miserable\" since using it a couple of hours ago. Coughing during phone call. Does not want to go to ED. He states that he feels that he should not use nebulizer again, and he will go \"back to what I was doing\". He would like a call when convenient - [phone] - but was informed that Dr. Clare Chanel is unavailable today.

## 2023-07-11 NOTE — TELEPHONE ENCOUNTER
Patient calls with question of use of nebulizer solns that were ordered at visit yesterday. Two solutions were prescribed. He asked if both are to be jused together at the same time. In Dr. Justyna Villarreal absence, Dr. Dread Coy was asked for input. He suggests using both together at same time; putting both liguids in the receptacle of nebulizer machine. This was relayed to patient, who expressed understanding.

## 2023-07-12 ENCOUNTER — TELEPHONE (OUTPATIENT)
Dept: INTERNAL MEDICINE CLINIC | Age: 88
End: 2023-07-12

## 2023-07-12 NOTE — TELEPHONE ENCOUNTER
I tried to call Ed, but got his voicemail. If the nebs made him feel worse, then I agree he shouldn't continue them. The idea was to try to help him cough up the phlegm he says he gets, so that he coughs less in the long run. It was worth a shot.

## 2023-07-12 NOTE — TELEPHONE ENCOUNTER
Ki Haleens would like to become a patient of Dr. Giovanny Sanchez. States his daughter and sister-in-law are current patients of Dr. Giovanny Sanchez. Would like a call back.     Please advise

## 2023-08-09 ENCOUNTER — OFFICE VISIT (OUTPATIENT)
Dept: INTERNAL MEDICINE CLINIC | Age: 88
End: 2023-08-09
Payer: MEDICARE

## 2023-08-09 VITALS
SYSTOLIC BLOOD PRESSURE: 120 MMHG | DIASTOLIC BLOOD PRESSURE: 70 MMHG | WEIGHT: 194 LBS | TEMPERATURE: 97.7 F | HEART RATE: 63 BPM | HEIGHT: 73 IN | OXYGEN SATURATION: 97 % | BODY MASS INDEX: 25.71 KG/M2

## 2023-08-09 DIAGNOSIS — I48.91 ATRIAL FIBRILLATION, UNSPECIFIED TYPE (HCC): ICD-10-CM

## 2023-08-09 DIAGNOSIS — Z23 NEED FOR PNEUMOCOCCAL 20-VALENT CONJUGATE VACCINATION: ICD-10-CM

## 2023-08-09 DIAGNOSIS — Z00.00 PE (PHYSICAL EXAM), ANNUAL: Primary | ICD-10-CM

## 2023-08-09 DIAGNOSIS — J84.112 IPF (IDIOPATHIC PULMONARY FIBROSIS) (HCC): ICD-10-CM

## 2023-08-09 DIAGNOSIS — E03.9 HYPOTHYROIDISM, UNSPECIFIED TYPE: ICD-10-CM

## 2023-08-09 DIAGNOSIS — E78.5 HYPERLIPIDEMIA, UNSPECIFIED HYPERLIPIDEMIA TYPE: ICD-10-CM

## 2023-08-09 DIAGNOSIS — J96.11 CHRONIC RESPIRATORY FAILURE WITH HYPOXIA (HCC): ICD-10-CM

## 2023-08-09 DIAGNOSIS — I10 PRIMARY HYPERTENSION: ICD-10-CM

## 2023-08-09 PROBLEM — J96.01 ACUTE RESPIRATORY FAILURE WITH HYPOXIA (HCC): Status: RESOLVED | Noted: 2021-05-13 | Resolved: 2023-08-09

## 2023-08-09 PROCEDURE — G0009 ADMIN PNEUMOCOCCAL VACCINE: HCPCS | Performed by: INTERNAL MEDICINE

## 2023-08-09 PROCEDURE — 90677 PCV20 VACCINE IM: CPT | Performed by: INTERNAL MEDICINE

## 2023-08-09 PROCEDURE — G0439 PPPS, SUBSEQ VISIT: HCPCS | Performed by: INTERNAL MEDICINE

## 2023-08-09 PROCEDURE — 1123F ACP DISCUSS/DSCN MKR DOCD: CPT | Performed by: INTERNAL MEDICINE

## 2023-08-09 RX ORDER — MIRTAZAPINE 15 MG/1
TABLET, FILM COATED ORAL NIGHTLY
COMMUNITY
Start: 2023-08-08

## 2023-08-09 RX ORDER — FEBUXOSTAT 80 MG/1
80 TABLET, FILM COATED ORAL PRN
COMMUNITY
Start: 2023-05-24

## 2023-08-09 SDOH — ECONOMIC STABILITY: HOUSING INSECURITY
IN THE LAST 12 MONTHS, WAS THERE A TIME WHEN YOU DID NOT HAVE A STEADY PLACE TO SLEEP OR SLEPT IN A SHELTER (INCLUDING NOW)?: NO

## 2023-08-09 SDOH — ECONOMIC STABILITY: FOOD INSECURITY: WITHIN THE PAST 12 MONTHS, THE FOOD YOU BOUGHT JUST DIDN'T LAST AND YOU DIDN'T HAVE MONEY TO GET MORE.: NEVER TRUE

## 2023-08-09 SDOH — ECONOMIC STABILITY: INCOME INSECURITY: HOW HARD IS IT FOR YOU TO PAY FOR THE VERY BASICS LIKE FOOD, HOUSING, MEDICAL CARE, AND HEATING?: NOT HARD AT ALL

## 2023-08-09 SDOH — ECONOMIC STABILITY: FOOD INSECURITY: WITHIN THE PAST 12 MONTHS, YOU WORRIED THAT YOUR FOOD WOULD RUN OUT BEFORE YOU GOT MONEY TO BUY MORE.: NEVER TRUE

## 2023-08-09 ASSESSMENT — LIFESTYLE VARIABLES
HOW MANY STANDARD DRINKS CONTAINING ALCOHOL DO YOU HAVE ON A TYPICAL DAY: 1 OR 2
HOW OFTEN DO YOU HAVE A DRINK CONTAINING ALCOHOL: MONTHLY OR LESS

## 2023-08-09 ASSESSMENT — PATIENT HEALTH QUESTIONNAIRE - PHQ9
SUM OF ALL RESPONSES TO PHQ9 QUESTIONS 1 & 2: 0
1. LITTLE INTEREST OR PLEASURE IN DOING THINGS: 0
SUM OF ALL RESPONSES TO PHQ QUESTIONS 1-9: 0
2. FEELING DOWN, DEPRESSED OR HOPELESS: 0
SUM OF ALL RESPONSES TO PHQ QUESTIONS 1-9: 0

## 2023-08-09 NOTE — PROGRESS NOTES
Historical Provider, MD   diphenhydrAMINE-APAP, sleep, (TYLENOL PM EXTRA STRENGTH)  MG tablet Take 2 tablets by mouth nightly Yes Historical Provider, MD   folic acid (FOLVITE) 1 MG tablet Take 1 tablet by mouth daily Yes Historical Provider, MD   vitamin D (CHOLECALCIFEROL) 25 MCG (1000 UT) TABS tablet Take 2 tablets by mouth daily Yes Historical Provider, MD   coenzyme Q10 100 MG CAPS capsule Take 1 capsule by mouth daily Yes Historical Provider, MD   levothyroxine (SYNTHROID) 112 MCG tablet Take 1 tablet by mouth every morning (before breakfast) Yes Historical Provider, MD   temazepam (RESTORIL) 15 MG capsule Take 1 capsule by mouth nightly. Yes Historical Provider, MD   latanoprost (XALATAN) 0.005 % ophthalmic solution Place 1 drop into both eyes nightly Yes Historical Provider, MD   tamsulosin (FLOMAX) 0.4 MG capsule Take 1 capsule by mouth daily Yes Historical Provider, MD   furosemide (LASIX) 40 MG tablet Take 1 tablet by mouth daily Yes Historical Provider, MD   turmeric 500 MG CAPS Take 1 capsule by mouth daily Yes Historical Provider, MD   vitamin B-6 (PYRIDOXINE) 100 MG tablet Take 1 tablet by mouth daily Yes Historical Provider, MD   amLODIPine (NORVASC) 5 MG tablet Take 1 tablet by mouth daily as needed (BP > 140/90 at 10AM check) Yes Historical Provider, MD   calcium-vitamin D (OSCAL-500) 500-200 MG-UNIT per tablet Take 1 tablet by mouth daily Yes Historical Provider, MD   vitamin B-12 (CYANOCOBALAMIN) 1000 MCG tablet Take 1 tablet by mouth daily Yes Historical Provider, MD   testosterone enanthate (DELATESTRYL) 200 MG/ML injection Inject 0.5 mLs into the muscle every 14 days.  Yes Historical Provider, MD   simvastatin (ZOCOR) 20 MG tablet Take 0.5 tablets by mouth nightly Yes Guanaco Dejesus MD   finasteride (PROSCAR) 5 MG tablet Take 1 tablet by mouth daily Yes Historical Provider, MD   febuxostat (ULORIC) 80 MG TABS tablet Take 1 tablet by mouth as needed  Historical Provider, MD Chow
Future  - Urinalysis; Future  - Vitamin D 25 Hydroxy; Future    5. Chronic respiratory failure with hypoxia (HCC)  O2 as needed    6. Atrial fibrillation, unspecified type ([address])  This problem is stable now normal sinus rhythm- CBC with Auto Differential; Future  - Comprehensive Metabolic Panel; Future  - Lipid Panel; Future  - TSH; Future  - Urinalysis; Future  - Vitamin D 25 Hydroxy; Future    7. PE (physical exam), annual  Check screening labs continue diet and exercise referral for physical therapy for evaluation of unsteady gait he is hesitant to go at this point Prevnar given  - CBC with Auto Differential; Future  - Comprehensive Metabolic Panel; Future  - Lipid Panel; Future  - TSH; Future  - Urinalysis; Future  - Vitamin D 25 Hydroxy;  Future  - External Referral To Physical Therapy

## 2023-08-10 NOTE — TELEPHONE ENCOUNTER
Pt states that yesterday he was coughing up tan sputum yesterday,  today has very dark brown sputum.      Per Ozzy Anglin,    Rx called in, pt informed

## 2023-08-11 RX ORDER — AZITHROMYCIN 250 MG/1
250 TABLET, FILM COATED ORAL SEE ADMIN INSTRUCTIONS
Qty: 6 TABLET | Refills: 0 | OUTPATIENT
Start: 2023-08-11 | End: 2023-08-16

## 2023-08-14 RX ORDER — AZITHROMYCIN 250 MG/1
250 TABLET, FILM COATED ORAL SEE ADMIN INSTRUCTIONS
Qty: 6 TABLET | Refills: 0 | Status: SHIPPED | OUTPATIENT
Start: 2023-08-14 | End: 2023-08-19

## 2023-08-14 NOTE — TELEPHONE ENCOUNTER
Pt called in stating that he did take 3 pills from the zpak however pt said that the rest was either thrown out or lost.  Pt would like to know if Dr. Ambreen Fisher would like him to continue or just stop. Please call and advise.

## 2023-08-18 ENCOUNTER — TELEPHONE (OUTPATIENT)
Dept: PULMONOLOGY | Age: 88
End: 2023-08-18

## 2023-08-18 NOTE — TELEPHONE ENCOUNTER
Cough badly since 7 AM today. He taken something that he doesn't remember. Uses O2 at 5 LPM when he is coughing. Coughing up a \"whole lot of stuff\" this morning. Dr. Aliza Sosa put him on Z-pack and sputum went from brown to normal light tan color after using Z-pack. He thinks the Z-pack worked.

## 2023-08-21 ENCOUNTER — TELEPHONE (OUTPATIENT)
Dept: INTERNAL MEDICINE CLINIC | Age: 88
End: 2023-08-21

## 2023-08-21 NOTE — TELEPHONE ENCOUNTER
He has been taking 2 tablets twice a day (total of four a day) of:    Mucus Relief  mg. Is there any harm in him taking 6 - 8 tablets a day? ??    Please call and advise No

## 2023-09-06 ENCOUNTER — TELEPHONE (OUTPATIENT)
Dept: INTERNAL MEDICINE CLINIC | Age: 88
End: 2023-09-06

## 2023-09-06 DIAGNOSIS — N18.32 STAGE 3B CHRONIC KIDNEY DISEASE (HCC): Primary | ICD-10-CM

## 2023-09-06 NOTE — TELEPHONE ENCOUNTER
Patient called in requesting a referral to a nephrologist. Patient's previous nephrologist retired. Pls call and advise.

## 2023-09-13 RX ORDER — MIRTAZAPINE 15 MG/1
15 TABLET, FILM COATED ORAL NIGHTLY
Qty: 90 TABLET | Refills: 0 | Status: SHIPPED | OUTPATIENT
Start: 2023-09-13

## 2023-09-13 NOTE — TELEPHONE ENCOUNTER
Patient is requesting a refill on the medication listed below for sleep. States he is about out of them. Usually gets a refill from another doctor but he has made the switch over to Dr. Asif De Leon now.     Please call and advise [phone]     mirtazapine (REMERON) 15 MG tablet [[id number]]    [address], [address]

## 2023-09-14 ENCOUNTER — TELEPHONE (OUTPATIENT)
Dept: INTERNAL MEDICINE CLINIC | Age: 88
End: 2023-09-14

## 2023-09-14 ENCOUNTER — TELEPHONE (OUTPATIENT)
Dept: PULMONOLOGY | Age: 88
End: 2023-09-14

## 2023-09-14 NOTE — TELEPHONE ENCOUNTER
Patient insists on speaking with Dr. Jethro Lane about his insurance issue. I asked the patient what were his questions and how could I help him but he stated he doesn't want to tell me he only wants to tell Dr. Jethro Lane because he was a patient of his and now he's a patient of Dr. Elveria Brittle.      Please call and advise [phone]

## 2023-09-14 NOTE — TELEPHONE ENCOUNTER
Pt c/o of a non productive cough. He knows he has a \"glob\" in his throat but is unable to spit it out, it is \"stuck\"  Pt says he is using nebulizer machine 2x day. I explained he could use every 4 hours if needed. He said that he is still taking Mucinex DM tablets, but daughter Colin Santillan) is in the back ground saying she does not think he is taking them 2x daily. At night when he goes to bed, he lays flat, would it be better for him to start out at an incline? Is there something else he can do to get this \"glob\" up and out, that he is not already doing?

## 2023-09-15 ENCOUNTER — TELEPHONE (OUTPATIENT)
Dept: INTERNAL MEDICINE CLINIC | Age: 88
End: 2023-09-15

## 2023-09-15 DIAGNOSIS — N18.4 CKD (CHRONIC KIDNEY DISEASE) STAGE 4, GFR 15-29 ML/MIN (HCC): ICD-10-CM

## 2023-09-15 NOTE — TELEPHONE ENCOUNTER
Pt would like Dr. Du Alan to call him. He wants his opinion on which INS he should switch to due to [facility] not taking [place] soon.

## 2023-09-16 LAB
25(OH)D3 SERPL-MCNC: 112.6 NG/ML
ALBUMIN SERPL-MCNC: 4.6 G/DL (ref 3.4–5)
ANION GAP SERPL CALCULATED.3IONS-SCNC: 8 MMOL/L (ref 3–16)
BACTERIA URNS QL MICRO: ABNORMAL /HPF
BILIRUB UR QL STRIP.AUTO: NEGATIVE
BUN SERPL-MCNC: 44 MG/DL (ref 7–20)
CALCIUM SERPL-MCNC: 10.9 MG/DL (ref 8.3–10.6)
CHLORIDE SERPL-SCNC: 103 MMOL/L (ref 99–110)
CLARITY UR: CLEAR
CO2 SERPL-SCNC: 29 MMOL/L (ref 21–32)
COLOR UR: YELLOW
CREAT SERPL-MCNC: 2.6 MG/DL (ref 0.8–1.3)
CREAT UR-MCNC: 61.3 MG/DL (ref 39–259)
CREAT UR-MCNC: 62.3 MG/DL (ref 39–259)
EPI CELLS #/AREA URNS AUTO: 0 /HPF (ref 0–5)
GFR SERPLBLD CREATININE-BSD FMLA CKD-EPI: 22 ML/MIN/{1.73_M2}
GLUCOSE SERPL-MCNC: 114 MG/DL (ref 70–99)
GLUCOSE UR STRIP.AUTO-MCNC: NEGATIVE MG/DL
HGB UR QL STRIP.AUTO: NEGATIVE
HYALINE CASTS #/AREA URNS AUTO: 2 /LPF (ref 0–8)
KETONES UR STRIP.AUTO-MCNC: NEGATIVE MG/DL
LEUKOCYTE ESTERASE UR QL STRIP.AUTO: NEGATIVE
MICROALBUMIN UR DL<=1MG/L-MCNC: 6.8 MG/DL
MICROALBUMIN/CREAT UR: 109.1 MG/G (ref 0–30)
NITRITE UR QL STRIP.AUTO: NEGATIVE
PH UR STRIP.AUTO: 6 [PH] (ref 5–8)
PHOSPHATE SERPL-MCNC: 3.8 MG/DL (ref 2.5–4.9)
POTASSIUM SERPL-SCNC: 4.7 MMOL/L (ref 3.5–5.1)
PROT UR STRIP.AUTO-MCNC: ABNORMAL MG/DL
PROT UR-MCNC: 16 MG/DL
PROT/CREAT UR-RTO: 0.3 MG/DL
PTH-INTACT SERPL-MCNC: 39.4 PG/ML (ref 14–72)
RBC CLUMPS #/AREA URNS AUTO: 1 /HPF (ref 0–4)
SODIUM SERPL-SCNC: 140 MMOL/L (ref 136–145)
SP GR UR STRIP.AUTO: 1.01 (ref 1–1.03)
UA DIPSTICK W REFLEX MICRO PNL UR: YES
URN SPEC COLLECT METH UR: ABNORMAL
UROBILINOGEN UR STRIP-ACNC: 0.2 E.U./DL
WBC #/AREA URNS AUTO: 0 /HPF (ref 0–5)

## 2023-09-18 ENCOUNTER — OFFICE VISIT (OUTPATIENT)
Dept: INTERNAL MEDICINE CLINIC | Age: 88
End: 2023-09-18
Payer: MEDICARE

## 2023-09-18 VITALS
DIASTOLIC BLOOD PRESSURE: 71 MMHG | HEIGHT: 73 IN | BODY MASS INDEX: 26.11 KG/M2 | SYSTOLIC BLOOD PRESSURE: 131 MMHG | WEIGHT: 197 LBS

## 2023-09-18 DIAGNOSIS — Z23 NEED FOR INFLUENZA VACCINATION: ICD-10-CM

## 2023-09-18 DIAGNOSIS — M25.561 RIGHT KNEE PAIN, UNSPECIFIED CHRONICITY: Primary | ICD-10-CM

## 2023-09-18 PROCEDURE — 1123F ACP DISCUSS/DSCN MKR DOCD: CPT | Performed by: INTERNAL MEDICINE

## 2023-09-18 PROCEDURE — 99213 OFFICE O/P EST LOW 20 MIN: CPT | Performed by: INTERNAL MEDICINE

## 2023-09-18 PROCEDURE — G0008 ADMIN INFLUENZA VIRUS VAC: HCPCS | Performed by: INTERNAL MEDICINE

## 2023-09-18 PROCEDURE — 90694 VACC AIIV4 NO PRSRV 0.5ML IM: CPT | Performed by: INTERNAL MEDICINE

## 2023-09-18 NOTE — PROGRESS NOTES
CHIEF COMPLAINT: Patrica Mares is a 80 y.o. male who presents for : Right knee pain    HPI: Patient presented with right knee pain with slight effusion for about 2 months he denies any injury to the area he did have surgery when he was in his 46s and then a but still has his knee intact    Review of Systems:   Constitutional:  Denies fever or chills   Eyes:  Denies change in visual acuity   HENT:  Denies nasal congestion or sore throat   Respiratory:  Denies cough or shortness of breath   Cardiovascular:  Denies chest pain or edema   GI:  Denies abdominal pain, nausea, vomiting, bloody stools or diarrhea   :  Denies dysuria   Musculoskeletal:  Denies back pain or joint pain   Integument:  Denies rash   Neurologic:  Denies headache, focal weakness or sensory changes   Endocrine:  Denies polyuria or polydipsia   Lymphatic:  Denies swollen glands   Psychiatric:  Denies depression or anxiety     Past Medical History:        Diagnosis Date    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      Atrial fibrillation ([address])     on event monitor    BPH (benign prostatic hyperplasia)     Gout     HTN (hypertension)     Hyperlipidemia     Hypothyroidism     Kidney stones     Pacemaker        Past Surgical History:        Procedure Laterality Date    SKIN CANCER EXCISION      SKIN CANCER EXCISION  2012    melanoma in [address]      L4-5    TIBIA FRACTURE SURGERY         Family History:  Family History   Problem Relation Age of Onset    Other Sister         Rheumatic fever       Social History:  Social History     Socioeconomic History    Marital status:      Spouse name: None    Number of children: None    Years of education: None    Highest education level: None   Tobacco Use    Smoking status: Former     Packs/day: 1.00     Years: 10.00     Additional pack years: 0.00     Total pack years: 10.00     Types: Cigarettes     Quit date: 1963     Years since quittin.7

## 2023-09-18 NOTE — RESULT ENCOUNTER NOTE
Kidney function stable   Moderate albumin in urine   Calcium is elevated  High Vit D  Plan:  Stop all Vit D/ Calcium supplement  Keep good hydration  Recheck labs in 2 weeks

## 2023-09-19 NOTE — TELEPHONE ENCOUNTER
Rx Request  -- 90 day supply for both    furosemide (LASIX) 40 MG tablet     Vitamin K2    [facility] [address] [place] [address], [facility] -  [phone] - F [phone]

## 2023-09-20 RX ORDER — FUROSEMIDE 40 MG/1
40 TABLET ORAL DAILY
Qty: 60 TABLET | Refills: 2 | Status: SHIPPED | OUTPATIENT
Start: 2023-09-20

## 2023-09-22 RX ORDER — FEBUXOSTAT 80 MG/1
80 TABLET, FILM COATED ORAL PRN
Qty: 30 TABLET | Refills: 2 | Status: SHIPPED | OUTPATIENT
Start: 2023-09-22

## 2023-09-22 NOTE — TELEPHONE ENCOUNTER
Patient needs refill:      febuxostat Enzo Montane) 80 MG TABS tablet    [address], [facility] [phone]      Pls advise.

## 2023-09-23 DIAGNOSIS — R06.02 SOB (SHORTNESS OF BREATH): Primary | ICD-10-CM

## 2023-09-25 RX ORDER — IPRATROPIUM BROMIDE AND ALBUTEROL SULFATE 2.5; .5 MG/3ML; MG/3ML
SOLUTION RESPIRATORY (INHALATION)
Qty: 360 ML | Refills: 3 | Status: SHIPPED | OUTPATIENT
Start: 2023-09-25

## 2023-09-26 RX ORDER — RIVAROXABAN 20 MG/1
20 TABLET, FILM COATED ORAL
Qty: 90 TABLET | Refills: 1 | Status: SHIPPED | OUTPATIENT
Start: 2023-09-26

## 2023-09-26 NOTE — TELEPHONE ENCOUNTER
Patient needs refill:    Daron Found 20 MG TABS tablet    [address], [facility] -  [phone]      Pls advise.

## 2023-09-27 ENCOUNTER — OFFICE VISIT (OUTPATIENT)
Dept: PULMONOLOGY | Age: 88
End: 2023-09-27
Payer: MEDICARE

## 2023-09-27 VITALS
RESPIRATION RATE: 18 BRPM | OXYGEN SATURATION: 97 % | DIASTOLIC BLOOD PRESSURE: 70 MMHG | SYSTOLIC BLOOD PRESSURE: 120 MMHG | WEIGHT: 195 LBS | HEART RATE: 64 BPM | HEIGHT: 73 IN | BODY MASS INDEX: 25.84 KG/M2

## 2023-09-27 DIAGNOSIS — R05.3 CHRONIC COUGH: ICD-10-CM

## 2023-09-27 DIAGNOSIS — J96.11 CHRONIC RESPIRATORY FAILURE WITH HYPOXIA (HCC): ICD-10-CM

## 2023-09-27 DIAGNOSIS — J84.9 ILD (INTERSTITIAL LUNG DISEASE) (HCC): ICD-10-CM

## 2023-09-27 DIAGNOSIS — R06.02 SOB (SHORTNESS OF BREATH): Primary | ICD-10-CM

## 2023-09-27 DIAGNOSIS — J47.9 BRONCHIECTASIS WITHOUT COMPLICATION (HCC): ICD-10-CM

## 2023-09-27 DIAGNOSIS — J84.112 IPF (IDIOPATHIC PULMONARY FIBROSIS) (HCC): ICD-10-CM

## 2023-09-27 PROCEDURE — 1123F ACP DISCUSS/DSCN MKR DOCD: CPT | Performed by: INTERNAL MEDICINE

## 2023-09-27 PROCEDURE — 99214 OFFICE O/P EST MOD 30 MIN: CPT | Performed by: INTERNAL MEDICINE

## 2023-09-27 RX ORDER — IPRATROPIUM BROMIDE 21 UG/1
2 SPRAY, METERED NASAL 2 TIMES DAILY
Qty: 1 EACH | Refills: 2 | Status: SHIPPED | OUTPATIENT
Start: 2023-09-27 | End: 2023-09-27

## 2023-09-27 RX ORDER — IPRATROPIUM BROMIDE 21 UG/1
2 SPRAY, METERED NASAL 2 TIMES DAILY
Qty: 1 EACH | Refills: 2 | Status: SHIPPED | OUTPATIENT
Start: 2023-09-27

## 2023-09-27 RX ORDER — SODIUM CHLORIDE FOR INHALATION 3 %
4 VIAL, NEBULIZER (ML) INHALATION 2 TIMES DAILY
Qty: 240 ML | Refills: 2 | Status: SHIPPED | OUTPATIENT
Start: 2023-09-27

## 2023-09-27 RX ORDER — SODIUM CHLORIDE FOR INHALATION 3 %
4 VIAL, NEBULIZER (ML) INHALATION 2 TIMES DAILY
Qty: 240 ML | Refills: 2 | Status: SHIPPED | OUTPATIENT
Start: 2023-09-27 | End: 2023-09-27

## 2023-09-29 ENCOUNTER — TELEPHONE (OUTPATIENT)
Dept: INTERNAL MEDICINE CLINIC | Age: 88
End: 2023-09-29

## 2023-09-29 DIAGNOSIS — J84.9 INTERSTITIAL LUNG DISEASE (HCC): Primary | ICD-10-CM

## 2023-09-29 NOTE — TELEPHONE ENCOUNTER
Ok    The referral has been replaced. Message has been left for [facility].

## 2023-09-29 NOTE — TELEPHONE ENCOUNTER
Would like a referral placed for Hospice in home care. please call [facility] and let her know. Would like to use [facility], again, pt has been to there program before.

## 2023-10-04 NOTE — TELEPHONE ENCOUNTER
Patient is asking what vaccines he still needs to get. Maryuri said she would call him back. .. Please call him back.

## 2023-10-16 RX ORDER — LEVOTHYROXINE SODIUM 112 UG/1
112 TABLET ORAL
Qty: 90 TABLET | Refills: 1 | Status: SHIPPED | OUTPATIENT
Start: 2023-10-16

## 2023-10-17 ENCOUNTER — TELEPHONE (OUTPATIENT)
Dept: INTERNAL MEDICINE CLINIC | Age: 88
End: 2023-10-17

## 2023-10-17 NOTE — TELEPHONE ENCOUNTER
Dawson Wells from [facility] states they faxed over a form for Dr. Asif De Leon to sign and send back. States it was sent over on 10/11/23 for  terminal illness. Checking status to see if he will sign and if received. .    Please advise [phone]    Fax: [phone]

## 2023-10-25 ENCOUNTER — TELEPHONE (OUTPATIENT)
Dept: INTERNAL MEDICINE CLINIC | Age: 88
End: 2023-10-25

## 2023-10-25 NOTE — TELEPHONE ENCOUNTER
Patient called in wanting to be seen by Dr. Noreen Patton for a growth on his chest. Would like to be seen asap.      Pls call and advise

## 2023-10-27 ENCOUNTER — OFFICE VISIT (OUTPATIENT)
Dept: INTERNAL MEDICINE CLINIC | Age: 88
End: 2023-10-27
Payer: MEDICARE

## 2023-10-27 VITALS
OXYGEN SATURATION: 99 % | HEIGHT: 73 IN | BODY MASS INDEX: 25.87 KG/M2 | TEMPERATURE: 97.3 F | WEIGHT: 195.2 LBS | SYSTOLIC BLOOD PRESSURE: 126 MMHG | DIASTOLIC BLOOD PRESSURE: 70 MMHG | HEART RATE: 64 BPM

## 2023-10-27 DIAGNOSIS — F33.0 MAJOR DEPRESSIVE DISORDER, RECURRENT, MILD (HCC): ICD-10-CM

## 2023-10-27 DIAGNOSIS — L98.9 SKIN LESION: Primary | ICD-10-CM

## 2023-10-27 PROBLEM — F33.1 MAJOR DEPRESSIVE DISORDER, RECURRENT, MODERATE (HCC): Status: ACTIVE | Noted: 2023-10-27

## 2023-10-27 PROBLEM — F33.9 MAJOR DEPRESSIVE DISORDER, RECURRENT, UNSPECIFIED (HCC): Status: ACTIVE | Noted: 2023-10-27

## 2023-10-27 PROCEDURE — 99212 OFFICE O/P EST SF 10 MIN: CPT | Performed by: INTERNAL MEDICINE

## 2023-10-27 PROCEDURE — 1123F ACP DISCUSS/DSCN MKR DOCD: CPT | Performed by: INTERNAL MEDICINE

## 2023-10-27 NOTE — PROGRESS NOTES
CHIEF COMPLAINT: Leyda Fitzgerald is a 80 y.o. male who presents for : Skin lesion of anterior abdominal wall    HPI: Patient presented with increasing size of a skin lesion on the anterior abdominal wall he notes somewhat irritated does not actively bleed    Review of Systems:   Constitutional:  Denies fever or chills   Eyes:  Denies change in visual acuity   HENT:  Denies nasal congestion or sore throat   Respiratory:  Denies cough or shortness of breath   Cardiovascular:  Denies chest pain or edema   GI:  Denies abdominal pain, nausea, vomiting, bloody stools or diarrhea   :  Denies dysuria   Musculoskeletal:  Denies back pain or joint pain   Integument:  Denies rash   Neurologic:  Denies headache, focal weakness or sensory changes   Endocrine:  Denies polyuria or polydipsia   Lymphatic:  Denies swollen glands   Psychiatric:  Denies depression or anxiety     Past Medical History:        Diagnosis Date    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      Atrial fibrillation ([address])     on event monitor    BPH (benign prostatic hyperplasia)     Gout     HTN (hypertension)     Hyperlipidemia     Hypothyroidism     Kidney stones     Pacemaker        Past Surgical History:        Procedure Laterality Date    SKIN CANCER EXCISION      SKIN CANCER EXCISION  2012    melanoma in [address]      L4-5    TIBIA FRACTURE SURGERY         Family History:  Family History   Problem Relation Age of Onset    Other Sister         Rheumatic fever       Social History:  Social History     Socioeconomic History    Marital status:      Spouse name: None    Number of children: None    Years of education: None    Highest education level: None   Tobacco Use    Smoking status: Former     Packs/day: 1.00     Years: 10.00     Additional pack years: 0.00     Total pack years: 10.00     Types: Cigarettes     Quit date: 1963     Years since quittin.8    Smokeless tobacco: Never   Vaping Use

## 2023-12-12 ENCOUNTER — OFFICE VISIT (OUTPATIENT)
Dept: PULMONOLOGY | Age: 88
End: 2023-12-12
Payer: MEDICARE

## 2023-12-12 VITALS
DIASTOLIC BLOOD PRESSURE: 78 MMHG | HEART RATE: 64 BPM | WEIGHT: 195 LBS | BODY MASS INDEX: 25.84 KG/M2 | OXYGEN SATURATION: 90 % | SYSTOLIC BLOOD PRESSURE: 130 MMHG | HEIGHT: 73 IN | RESPIRATION RATE: 16 BRPM

## 2023-12-12 DIAGNOSIS — J84.112 IPF (IDIOPATHIC PULMONARY FIBROSIS) (HCC): Primary | ICD-10-CM

## 2023-12-12 DIAGNOSIS — R05.3 CHRONIC COUGH: ICD-10-CM

## 2023-12-12 DIAGNOSIS — J96.11 CHRONIC RESPIRATORY FAILURE WITH HYPOXIA (HCC): ICD-10-CM

## 2023-12-12 PROCEDURE — 1123F ACP DISCUSS/DSCN MKR DOCD: CPT | Performed by: INTERNAL MEDICINE

## 2023-12-12 PROCEDURE — 99213 OFFICE O/P EST LOW 20 MIN: CPT | Performed by: INTERNAL MEDICINE

## 2023-12-12 RX ORDER — MIRTAZAPINE 15 MG/1
15 TABLET, FILM COATED ORAL NIGHTLY
Qty: 90 TABLET | Refills: 0 | Status: SHIPPED | OUTPATIENT
Start: 2023-12-12

## 2023-12-12 NOTE — PROGRESS NOTES
Diffuse inspiratory crackles heard throughout all lung fields posteriorly and anteriorly. CARDIOVASCULAR:  normal S1 and S2 and no JVD  ABDOMEN:  Normal bowel sounds, non-distended and non-tender to palpation  EXT: No edema, no calf tenderness. Pulses are present bilaterally. NEUROLOGIC:  Mental Status Exam:  Level of Alertness:   awake  Orientation:   person, place, time. SKIN:  normal skin color, texture, turgor, no redness, warmth, or swelling     DATA:      Radiology Review:  Pertinent images / reports were reviewed as a part of this visit. Impression       1. Chronic diffuse bronchiectasis and interstitial lung disease and pulmonary fibrosis  , as described above without diffuse segmental pneumonia. Areas of pleural and subpleural fibrotic changes and reticulonodular scarring are noted, greater in the    right lung. 2. No pleural effusion. Pacemaker wires evident with atherosclerotic disease of the left anterior descending coronary artery   3.  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, measuring approximate 4.1 x 4.2 cm       Last PFTs: 2015, normal    Immunizations:   Immunization History   Administered Date(s) Administered    COVID-19, MODERNA BLUE border, Primary or Immunocompromised, (age 12y+), IM, 100 mcg/0.5mL 01/07/2021, 02/04/2021, 10/28/2021, 04/07/2022    COVID-19, PFIZER Bivalent, DO NOT Dilute, (age 12y+), IM, 30 mcg/0.3 mL 10/10/2022    COVID-19, PFIZER, (2023-24 formula), (age 12y+), IM, 30mcg/0.3mL 10/30/2023    Influenza A (J9X9-80) Vaccine PF IM 12/24/2009    Influenza Virus Vaccine 12/24/2009, 09/26/2013, 09/22/2014, 10/25/2018, 03/06/2020, 10/02/2020    Influenza, FLUAD, (age 72 y+), Adjuvanted, 0.5mL 09/18/2023    Influenza, FLUZONE (age 72 y+), High Dose, 0.7mL 10/19/2021, 10/10/2022    Influenza, High Dose (Fluzone 65 yrs and older) 10/14/2015, 10/09/2017    Pneumococcal, PCV-13, PREVNAR 13, (age 6w+), IM, 0.5mL 03/06/2015, 10/14/2015    Pneumococcal, PCV20, PREVNAR 20,

## 2023-12-14 ENCOUNTER — TELEPHONE (OUTPATIENT)
Dept: PULMONOLOGY | Age: 88
End: 2023-12-14

## 2023-12-18 DIAGNOSIS — F51.01 PRIMARY INSOMNIA: ICD-10-CM

## 2023-12-18 DIAGNOSIS — J84.112 IPF (IDIOPATHIC PULMONARY FIBROSIS) (HCC): ICD-10-CM

## 2023-12-18 DIAGNOSIS — I10 PRIMARY HYPERTENSION: ICD-10-CM

## 2023-12-18 DIAGNOSIS — E03.9 HYPOTHYROIDISM, UNSPECIFIED TYPE: ICD-10-CM

## 2023-12-18 PROBLEM — L03.119 CELLULITIS OF HAND: Status: RESOLVED | Noted: 2020-07-18 | Resolved: 2023-12-18

## 2023-12-18 PROBLEM — K52.9 ACUTE GASTROENTERITIS: Status: RESOLVED | Noted: 2022-01-11 | Resolved: 2023-12-18

## 2023-12-18 PROBLEM — F33.0 MAJOR DEPRESSIVE DISORDER, RECURRENT, MILD (HCC): Status: RESOLVED | Noted: 2023-10-27 | Resolved: 2023-12-18

## 2023-12-18 PROBLEM — F33.1 MAJOR DEPRESSIVE DISORDER, RECURRENT, MODERATE (HCC): Status: RESOLVED | Noted: 2023-10-27 | Resolved: 2023-12-18

## 2023-12-18 PROBLEM — R10.9 ABDOMINAL PAIN: Status: RESOLVED | Noted: 2021-02-27 | Resolved: 2023-12-18

## 2023-12-18 LAB
ALBUMIN SERPL-MCNC: 5 G/DL (ref 3.4–5)
ALBUMIN/GLOB SERPL: 1.7 {RATIO} (ref 1.1–2.2)
ALP SERPL-CCNC: 58 U/L (ref 40–129)
ALT SERPL-CCNC: 15 U/L (ref 10–40)
ANION GAP SERPL CALCULATED.3IONS-SCNC: 10 MMOL/L (ref 3–16)
AST SERPL-CCNC: 24 U/L (ref 15–37)
BASOPHILS # BLD: 0.1 K/UL (ref 0–0.2)
BASOPHILS NFR BLD: 0.9 %
BILIRUB SERPL-MCNC: 0.3 MG/DL (ref 0–1)
BUN SERPL-MCNC: 42 MG/DL (ref 7–20)
CALCIUM SERPL-MCNC: 10.5 MG/DL (ref 8.3–10.6)
CHLORIDE SERPL-SCNC: 103 MMOL/L (ref 99–110)
CO2 SERPL-SCNC: 28 MMOL/L (ref 21–32)
CREAT SERPL-MCNC: 2.7 MG/DL (ref 0.8–1.3)
DEPRECATED RDW RBC AUTO: 15.7 % (ref 12.4–15.4)
EOSINOPHIL # BLD: 0.6 K/UL (ref 0–0.6)
EOSINOPHIL NFR BLD: 6.1 %
GFR SERPLBLD CREATININE-BSD FMLA CKD-EPI: 21 ML/MIN/{1.73_M2}
GLUCOSE SERPL-MCNC: 102 MG/DL (ref 70–99)
HCT VFR BLD AUTO: 43.5 % (ref 40.5–52.5)
HGB BLD-MCNC: 14.4 G/DL (ref 13.5–17.5)
LYMPHOCYTES # BLD: 1.4 K/UL (ref 1–5.1)
LYMPHOCYTES NFR BLD: 14 %
MCH RBC QN AUTO: 27.9 PG (ref 26–34)
MCHC RBC AUTO-ENTMCNC: 33.2 G/DL (ref 31–36)
MCV RBC AUTO: 84.1 FL (ref 80–100)
MONOCYTES # BLD: 0.8 K/UL (ref 0–1.3)
MONOCYTES NFR BLD: 7.8 %
NEUTROPHILS # BLD: 7.4 K/UL (ref 1.7–7.7)
NEUTROPHILS NFR BLD: 71.2 %
PLATELET # BLD AUTO: 204 K/UL (ref 135–450)
PMV BLD AUTO: 8.3 FL (ref 5–10.5)
POTASSIUM SERPL-SCNC: 5.2 MMOL/L (ref 3.5–5.1)
PROT SERPL-MCNC: 7.9 G/DL (ref 6.4–8.2)
RBC # BLD AUTO: 5.17 M/UL (ref 4.2–5.9)
SODIUM SERPL-SCNC: 141 MMOL/L (ref 136–145)
TSH SERPL DL<=0.005 MIU/L-ACNC: 3.76 UIU/ML (ref 0.27–4.2)
WBC # BLD AUTO: 10.4 K/UL (ref 4–11)

## 2024-01-01 ENCOUNTER — TELEPHONE (OUTPATIENT)
Dept: INTERNAL MEDICINE CLINIC | Age: 89
End: 2024-01-01

## 2024-01-09 ENCOUNTER — TELEPHONE (OUTPATIENT)
Dept: INTERNAL MEDICINE CLINIC | Age: 89
End: 2024-01-09

## 2024-01-09 NOTE — TELEPHONE ENCOUNTER
Pt is diagnosised with severe kidney diease as this has become part of a lawsuit and would like a statement about it and would like to know if Dr. Moreau has access to [facility]'s records.     Please call and advise

## 2024-01-10 NOTE — TELEPHONE ENCOUNTER
[facility]    Decrease in kidney functions    Records and statement of opinion.    Are you willing to write a statement regarding his kidney issues/health issues?    [facility].    Detailed message has been left for the patient.

## 2024-01-15 RX ORDER — FUROSEMIDE 40 MG/1
40 TABLET ORAL DAILY
Qty: 90 TABLET | Refills: 1 | Status: SHIPPED | OUTPATIENT
Start: 2024-01-15

## 2024-01-22 DIAGNOSIS — F51.01 PRIMARY INSOMNIA: ICD-10-CM

## 2024-01-22 RX ORDER — TEMAZEPAM 15 MG/1
15 CAPSULE ORAL NIGHTLY PRN
Qty: 90 CAPSULE | Refills: 0 | Status: SHIPPED | OUTPATIENT
Start: 2024-01-22 | End: 2024-01-28

## 2024-01-22 RX ORDER — TEMAZEPAM 15 MG/1
15 CAPSULE ORAL NIGHTLY
Qty: 90 CAPSULE | Refills: 0 | Status: ON HOLD | OUTPATIENT
Start: 2024-01-22 | End: 2024-04-21

## 2024-01-22 NOTE — TELEPHONE ENCOUNTER
Pt is requesting a refill on medication below and is asking if he can have refills on it. States he has been requesting this medication for a week now and no response.       Please call and advise [phone]      temazepam (RESTORIL) 15 MG capsule [[id number]]  [facility] - [place] [id number] - P [phone] - F [phone]

## 2024-01-26 ENCOUNTER — TELEPHONE (OUTPATIENT)
Dept: INTERNAL MEDICINE CLINIC | Age: 89
End: 2024-01-26

## 2024-01-26 DIAGNOSIS — R73.9 ELEVATED BLOOD SUGAR: Primary | ICD-10-CM

## 2024-01-26 DIAGNOSIS — N18.32 STAGE 3B CHRONIC KIDNEY DISEASE (HCC): ICD-10-CM

## 2024-01-26 RX ORDER — OMEPRAZOLE 20 MG/1
20 CAPSULE, DELAYED RELEASE ORAL EVERY EVENING
Qty: 90 CAPSULE | Refills: 1 | Status: ON HOLD | OUTPATIENT
Start: 2024-01-26

## 2024-01-26 NOTE — TELEPHONE ENCOUNTER
----- Message from Yenni Pritchard sent at 1/26/2024 12:35 PM EST -----  Subject: Referral Request    Reason for referral request? pt needs a1c order ready for his next appt,   sugar was 163 today  Provider patient wants to be referred to(if known):     Provider Phone Number(if known):    Additional Information for Provider?   ---------------------------------------------------------------------------  --------------  CALL BACK INFO    [phone]; Do not leave any message, patient will call back for answer  ---------------------------------------------------------------------------  --------------

## 2024-01-26 NOTE — TELEPHONE ENCOUNTER
If appropriate please refill pending medication   Last office visit : 12/12/2023   Next office visit :  5/2/2024    Thank you

## 2024-01-26 NOTE — TELEPHONE ENCOUNTER
----- Message from Yenni Pritchard sent at 1/26/2024 12:35 PM EST -----  Subject: Referral Request    Reason for referral request? pt needs a1c order ready for his next appt,   sugar was 163 today  Provider patient wants to be referred to(if known):     Provider Phone Number(if known):    Additional Information for Provider?   ---------------------------------------------------------------------------  --------------  CALL BACK INFO    [phone]; Do not leave any message, patient will call back for answer  ---------------------------------------------------------------------------  --------------

## 2024-01-27 ENCOUNTER — APPOINTMENT (OUTPATIENT)
Dept: GENERAL RADIOLOGY | Age: 89
DRG: 194 | End: 2024-01-27
Payer: MEDICARE

## 2024-01-27 ENCOUNTER — HOSPITAL ENCOUNTER (INPATIENT)
Age: 89
LOS: 2 days | Discharge: HOME | DRG: 194 | End: 2024-01-30
Attending: EMERGENCY MEDICINE | Admitting: HOSPITALIST
Payer: MEDICARE

## 2024-01-27 ENCOUNTER — APPOINTMENT (OUTPATIENT)
Dept: CT IMAGING | Age: 89
DRG: 194 | End: 2024-01-27
Payer: MEDICARE

## 2024-01-27 DIAGNOSIS — R53.81 DEBILITY: ICD-10-CM

## 2024-01-27 DIAGNOSIS — J18.9 PNEUMONIA OF RIGHT LOWER LOBE DUE TO INFECTIOUS ORGANISM: Primary | ICD-10-CM

## 2024-01-27 DIAGNOSIS — J84.10 PULMONARY FIBROSIS (HCC): ICD-10-CM

## 2024-01-27 LAB
ANION GAP SERPL CALCULATED.3IONS-SCNC: 11 MMOL/L (ref 3–16)
BASE EXCESS BLDV CALC-SCNC: 0 MMOL/L (ref -2–3)
BASOPHILS # BLD: 0.1 K/UL (ref 0–0.2)
BASOPHILS NFR BLD: 0.6 %
BUN SERPL-MCNC: 31 MG/DL (ref 7–20)
CALCIUM SERPL-MCNC: 9.1 MG/DL (ref 8.3–10.6)
CHLORIDE SERPL-SCNC: 109 MMOL/L (ref 99–110)
CO2 BLDV-SCNC: 27 MMOL/L
CO2 SERPL-SCNC: 19 MMOL/L (ref 21–32)
COHGB MFR BLDV: 1.5 % (ref 0–1.5)
CREAT SERPL-MCNC: 2 MG/DL (ref 0.8–1.3)
DEPRECATED RDW RBC AUTO: 15.8 % (ref 12.4–15.4)
DO-HGB MFR BLDV: 41.1 %
EOSINOPHIL # BLD: 0 K/UL (ref 0–0.6)
EOSINOPHIL NFR BLD: 0.1 %
FLUAV RNA RESP QL NAA+PROBE: NOT DETECTED
FLUBV RNA RESP QL NAA+PROBE: NOT DETECTED
GFR SERPLBLD CREATININE-BSD FMLA CKD-EPI: 31 ML/MIN/{1.73_M2}
GLUCOSE SERPL-MCNC: 149 MG/DL (ref 70–99)
HCO3 BLDV-SCNC: 25.4 MMOL/L (ref 24–28)
HCT VFR BLD AUTO: 40 % (ref 40.5–52.5)
HGB BLD-MCNC: 12.9 G/DL (ref 13.5–17.5)
LYMPHOCYTES # BLD: 0.9 K/UL (ref 1–5.1)
LYMPHOCYTES NFR BLD: 4.1 %
MCH RBC QN AUTO: 27 PG (ref 26–34)
MCHC RBC AUTO-ENTMCNC: 32.1 G/DL (ref 31–36)
MCV RBC AUTO: 83.9 FL (ref 80–100)
METHGB MFR BLDV: 0.2 % (ref 0–1.5)
MONOCYTES # BLD: 1.5 K/UL (ref 0–1.3)
MONOCYTES NFR BLD: 6.9 %
NEUTROPHILS # BLD: 19.1 K/UL (ref 1.7–7.7)
NEUTROPHILS NFR BLD: 88.3 %
NT-PROBNP SERPL-MCNC: 388 PG/ML (ref 0–449)
PCO2 BLDV: 43 MMHG (ref 41–51)
PH BLDV: 7.38 [PH] (ref 7.35–7.45)
PLATELET # BLD AUTO: 178 K/UL (ref 135–450)
PMV BLD AUTO: 7.9 FL (ref 5–10.5)
PO2 BLDV: 31 MMHG (ref 25–40)
POTASSIUM SERPL-SCNC: 4.2 MMOL/L (ref 3.5–5.1)
RBC # BLD AUTO: 4.77 M/UL (ref 4.2–5.9)
REASON FOR REJECTION: NORMAL
REJECTED TEST: NORMAL
SAO2 % BLDV: 58 %
SARS-COV-2 RNA RESP QL NAA+PROBE: NOT DETECTED
SODIUM SERPL-SCNC: 139 MMOL/L (ref 136–145)
TROPONIN, HIGH SENSITIVITY: 33 NG/L (ref 0–22)
WBC # BLD AUTO: 21.6 K/UL (ref 4–11)

## 2024-01-27 PROCEDURE — 82803 BLOOD GASES ANY COMBINATION: CPT

## 2024-01-27 PROCEDURE — 71250 CT THORAX DX C-: CPT

## 2024-01-27 PROCEDURE — 81003 URINALYSIS AUTO W/O SCOPE: CPT

## 2024-01-27 PROCEDURE — 83880 ASSAY OF NATRIURETIC PEPTIDE: CPT

## 2024-01-27 PROCEDURE — 99285 EMERGENCY DEPT VISIT HI MDM: CPT

## 2024-01-27 PROCEDURE — 80048 BASIC METABOLIC PNL TOTAL CA: CPT

## 2024-01-27 PROCEDURE — 85025 COMPLETE CBC W/AUTO DIFF WBC: CPT

## 2024-01-27 PROCEDURE — 36415 COLL VENOUS BLD VENIPUNCTURE: CPT

## 2024-01-27 PROCEDURE — 93005 ELECTROCARDIOGRAM TRACING: CPT | Performed by: EMERGENCY MEDICINE

## 2024-01-27 PROCEDURE — 71045 X-RAY EXAM CHEST 1 VIEW: CPT

## 2024-01-27 PROCEDURE — 87636 SARSCOV2 & INF A&B AMP PRB: CPT

## 2024-01-27 PROCEDURE — 84484 ASSAY OF TROPONIN QUANT: CPT

## 2024-01-28 PROBLEM — N18.4 CKD (CHRONIC KIDNEY DISEASE) STAGE 4, GFR 15-29 ML/MIN (HCC): Status: ACTIVE | Noted: 2024-01-28

## 2024-01-28 PROBLEM — J84.10 PULMONARY FIBROSIS (HCC): Status: ACTIVE | Noted: 2021-05-13

## 2024-01-28 PROBLEM — J18.9 SEPSIS DUE TO PNEUMONIA (HCC): Status: ACTIVE | Noted: 2024-01-28

## 2024-01-28 PROBLEM — R53.81 DEBILITY: Status: ACTIVE | Noted: 2024-01-28

## 2024-01-28 PROBLEM — A41.9 SEPSIS DUE TO PNEUMONIA (HCC): Status: ACTIVE | Noted: 2024-01-28

## 2024-01-28 LAB
ANION GAP SERPL CALCULATED.3IONS-SCNC: 11 MMOL/L (ref 3–16)
BACTERIA URNS QL MICRO: ABNORMAL /HPF
BILIRUB UR QL STRIP.AUTO: NEGATIVE
BUN SERPL-MCNC: 33 MG/DL (ref 7–20)
CALCIUM SERPL-MCNC: 10.5 MG/DL (ref 8.3–10.6)
CHLORIDE SERPL-SCNC: 103 MMOL/L (ref 99–110)
CLARITY UR: CLEAR
CO2 SERPL-SCNC: 22 MMOL/L (ref 21–32)
COARSE GRAN CASTS #/AREA URNS LPF: ABNORMAL /LPF (ref 0–2)
COLOR UR: YELLOW
CREAT SERPL-MCNC: 2.4 MG/DL (ref 0.8–1.3)
EKG ATRIAL RATE: 76 BPM
EKG DIAGNOSIS: NORMAL
EKG P AXIS: 30 DEGREES
EKG P-R INTERVAL: 168 MS
EKG Q-T INTERVAL: 462 MS
EKG QRS DURATION: 172 MS
EKG QTC CALCULATION (BAZETT): 519 MS
EKG R AXIS: 118 DEGREES
EKG T AXIS: 46 DEGREES
EKG VENTRICULAR RATE: 76 BPM
GFR SERPLBLD CREATININE-BSD FMLA CKD-EPI: 25 ML/MIN/{1.73_M2}
GLUCOSE SERPL-MCNC: 219 MG/DL (ref 70–99)
GLUCOSE UR STRIP.AUTO-MCNC: NEGATIVE MG/DL
HGB UR QL STRIP.AUTO: ABNORMAL
KETONES UR STRIP.AUTO-MCNC: NEGATIVE MG/DL
LEUKOCYTE ESTERASE UR QL STRIP.AUTO: NEGATIVE
MAGNESIUM SERPL-MCNC: 1.8 MG/DL (ref 1.8–2.4)
NITRITE UR QL STRIP.AUTO: NEGATIVE
PH UR STRIP.AUTO: 6 [PH] (ref 5–8)
POTASSIUM SERPL-SCNC: 4.4 MMOL/L (ref 3.5–5.1)
PROT UR STRIP.AUTO-MCNC: 100 MG/DL
RBC #/AREA URNS HPF: ABNORMAL /HPF (ref 0–4)
SODIUM SERPL-SCNC: 136 MMOL/L (ref 136–145)
SP GR UR STRIP.AUTO: 1.02 (ref 1–1.03)
TROPONIN, HIGH SENSITIVITY: 37 NG/L (ref 0–22)
UA COMPLETE W REFLEX CULTURE PNL UR: ABNORMAL
UA DIPSTICK W REFLEX MICRO PNL UR: YES
URN SPEC COLLECT METH UR: ABNORMAL
UROBILINOGEN UR STRIP-ACNC: 0.2 E.U./DL
WBC #/AREA URNS HPF: ABNORMAL /HPF (ref 0–5)

## 2024-01-28 PROCEDURE — 94640 AIRWAY INHALATION TREATMENT: CPT

## 2024-01-28 PROCEDURE — 87449 NOS EACH ORGANISM AG IA: CPT

## 2024-01-28 PROCEDURE — 6370000000 HC RX 637 (ALT 250 FOR IP)

## 2024-01-28 PROCEDURE — 6360000002 HC RX W HCPCS

## 2024-01-28 PROCEDURE — 2700000000 HC OXYGEN THERAPY PER DAY

## 2024-01-28 PROCEDURE — 99222 1ST HOSP IP/OBS MODERATE 55: CPT | Performed by: HOSPITALIST

## 2024-01-28 PROCEDURE — 2580000003 HC RX 258

## 2024-01-28 PROCEDURE — 87641 MR-STAPH DNA AMP PROBE: CPT

## 2024-01-28 PROCEDURE — 2500000003 HC RX 250 WO HCPCS

## 2024-01-28 PROCEDURE — 83735 ASSAY OF MAGNESIUM: CPT

## 2024-01-28 PROCEDURE — 1200000000 HC SEMI PRIVATE

## 2024-01-28 PROCEDURE — 87040 BLOOD CULTURE FOR BACTERIA: CPT

## 2024-01-28 PROCEDURE — 36415 COLL VENOUS BLD VENIPUNCTURE: CPT

## 2024-01-28 PROCEDURE — 2580000003 HC RX 258: Performed by: EMERGENCY MEDICINE

## 2024-01-28 PROCEDURE — 80048 BASIC METABOLIC PNL TOTAL CA: CPT

## 2024-01-28 PROCEDURE — 2500000003 HC RX 250 WO HCPCS: Performed by: EMERGENCY MEDICINE

## 2024-01-28 RX ORDER — SODIUM CHLORIDE FOR INHALATION 3 %
4 VIAL, NEBULIZER (ML) INHALATION 2 TIMES DAILY
Status: DISCONTINUED | OUTPATIENT
Start: 2024-01-28 | End: 2024-01-30 | Stop reason: HOSPADM

## 2024-01-28 RX ORDER — HEPARIN SODIUM 5000 [USP'U]/ML
5000 INJECTION, SOLUTION INTRAVENOUS; SUBCUTANEOUS EVERY 8 HOURS SCHEDULED
Status: DISCONTINUED | OUTPATIENT
Start: 2024-01-28 | End: 2024-01-28

## 2024-01-28 RX ORDER — ENOXAPARIN SODIUM 100 MG/ML
30 INJECTION SUBCUTANEOUS DAILY
Status: CANCELLED | OUTPATIENT
Start: 2024-01-28

## 2024-01-28 RX ORDER — FEBUXOSTAT 40 MG/1
80 TABLET, FILM COATED ORAL DAILY PRN
Status: DISCONTINUED | OUTPATIENT
Start: 2024-01-28 | End: 2024-01-30 | Stop reason: HOSPADM

## 2024-01-28 RX ORDER — POLYETHYLENE GLYCOL 3350 17 G/17G
17 POWDER, FOR SOLUTION ORAL DAILY PRN
Status: DISCONTINUED | OUTPATIENT
Start: 2024-01-28 | End: 2024-01-30 | Stop reason: HOSPADM

## 2024-01-28 RX ORDER — DOXYCYCLINE HYCLATE 100 MG
100 TABLET ORAL 2 TIMES DAILY
Qty: 20 TABLET | Refills: 0 | Status: SHIPPED | OUTPATIENT
Start: 2024-01-28 | End: 2024-01-30 | Stop reason: HOSPADM

## 2024-01-28 RX ORDER — ALBUTEROL SULFATE 2.5 MG/3ML
2.5 SOLUTION RESPIRATORY (INHALATION) EVERY 6 HOURS PRN
Status: DISCONTINUED | OUTPATIENT
Start: 2024-01-28 | End: 2024-01-30 | Stop reason: HOSPADM

## 2024-01-28 RX ORDER — ATORVASTATIN CALCIUM 10 MG/1
10 TABLET, FILM COATED ORAL DAILY
Status: DISCONTINUED | OUTPATIENT
Start: 2024-01-28 | End: 2024-01-30 | Stop reason: HOSPADM

## 2024-01-28 RX ORDER — PANTOPRAZOLE SODIUM 40 MG/1
40 TABLET, DELAYED RELEASE ORAL DAILY
Status: DISCONTINUED | OUTPATIENT
Start: 2024-01-28 | End: 2024-01-30 | Stop reason: HOSPADM

## 2024-01-28 RX ORDER — SODIUM CHLORIDE 9 MG/ML
INJECTION, SOLUTION INTRAVENOUS PRN
Status: DISCONTINUED | OUTPATIENT
Start: 2024-01-28 | End: 2024-01-30 | Stop reason: HOSPADM

## 2024-01-28 RX ORDER — TEMAZEPAM 15 MG/1
15 CAPSULE ORAL NIGHTLY
Status: DISCONTINUED | OUTPATIENT
Start: 2024-01-28 | End: 2024-01-30 | Stop reason: HOSPADM

## 2024-01-28 RX ORDER — SODIUM CHLORIDE 0.9 % (FLUSH) 0.9 %
5-40 SYRINGE (ML) INJECTION PRN
Status: DISCONTINUED | OUTPATIENT
Start: 2024-01-28 | End: 2024-01-30 | Stop reason: HOSPADM

## 2024-01-28 RX ORDER — LEVOTHYROXINE SODIUM 112 UG/1
112 TABLET ORAL
Status: DISCONTINUED | OUTPATIENT
Start: 2024-01-28 | End: 2024-01-30 | Stop reason: HOSPADM

## 2024-01-28 RX ORDER — SODIUM CHLORIDE 0.9 % (FLUSH) 0.9 %
5-40 SYRINGE (ML) INJECTION EVERY 12 HOURS SCHEDULED
Status: DISCONTINUED | OUTPATIENT
Start: 2024-01-28 | End: 2024-01-30 | Stop reason: HOSPADM

## 2024-01-28 RX ORDER — ACETAMINOPHEN 325 MG/1
650 TABLET ORAL EVERY 6 HOURS PRN
Status: DISCONTINUED | OUTPATIENT
Start: 2024-01-28 | End: 2024-01-30 | Stop reason: HOSPADM

## 2024-01-28 RX ORDER — MIRTAZAPINE 15 MG/1
15 TABLET, FILM COATED ORAL NIGHTLY
Status: DISCONTINUED | OUTPATIENT
Start: 2024-01-28 | End: 2024-01-30 | Stop reason: HOSPADM

## 2024-01-28 RX ORDER — AMLODIPINE BESYLATE 5 MG/1
5 TABLET ORAL DAILY PRN
Status: DISCONTINUED | OUTPATIENT
Start: 2024-01-28 | End: 2024-01-30 | Stop reason: HOSPADM

## 2024-01-28 RX ORDER — FUROSEMIDE 40 MG/1
40 TABLET ORAL DAILY
Status: DISCONTINUED | OUTPATIENT
Start: 2024-01-28 | End: 2024-01-30 | Stop reason: HOSPADM

## 2024-01-28 RX ORDER — ONDANSETRON 2 MG/ML
4 INJECTION INTRAMUSCULAR; INTRAVENOUS EVERY 6 HOURS PRN
Status: DISCONTINUED | OUTPATIENT
Start: 2024-01-28 | End: 2024-01-30 | Stop reason: HOSPADM

## 2024-01-28 RX ORDER — ACETAMINOPHEN 650 MG/1
650 SUPPOSITORY RECTAL EVERY 6 HOURS PRN
Status: DISCONTINUED | OUTPATIENT
Start: 2024-01-28 | End: 2024-01-30 | Stop reason: HOSPADM

## 2024-01-28 RX ORDER — LATANOPROST 50 UG/ML
1 SOLUTION/ DROPS OPHTHALMIC NIGHTLY
Status: DISCONTINUED | OUTPATIENT
Start: 2024-01-28 | End: 2024-01-30 | Stop reason: HOSPADM

## 2024-01-28 RX ORDER — ONDANSETRON 4 MG/1
4 TABLET, ORALLY DISINTEGRATING ORAL EVERY 8 HOURS PRN
Status: DISCONTINUED | OUTPATIENT
Start: 2024-01-28 | End: 2024-01-30 | Stop reason: HOSPADM

## 2024-01-28 RX ADMIN — TEMAZEPAM 15 MG: 15 CAPSULE ORAL at 21:10

## 2024-01-28 RX ADMIN — ONDANSETRON 4 MG: 2 INJECTION INTRAMUSCULAR; INTRAVENOUS at 08:22

## 2024-01-28 RX ADMIN — SODIUM CHLORIDE 30 MG/ML INHALATION SOLUTION 4 ML: 30 SOLUTION INHALANT at 09:10

## 2024-01-28 RX ADMIN — ALBUTEROL SULFATE 2.5 MG: 2.5 SOLUTION RESPIRATORY (INHALATION) at 09:10

## 2024-01-28 RX ADMIN — ALBUTEROL SULFATE 2.5 MG: 2.5 SOLUTION RESPIRATORY (INHALATION) at 21:16

## 2024-01-28 RX ADMIN — LEVOTHYROXINE SODIUM 112 MCG: 0.11 TABLET ORAL at 08:22

## 2024-01-28 RX ADMIN — MIRTAZAPINE 15 MG: 15 TABLET, FILM COATED ORAL at 21:10

## 2024-01-28 RX ADMIN — SODIUM CHLORIDE 30 MG/ML INHALATION SOLUTION 4 ML: 30 SOLUTION INHALANT at 21:16

## 2024-01-28 RX ADMIN — SODIUM CHLORIDE, PRESERVATIVE FREE 10 ML: 5 INJECTION INTRAVENOUS at 08:23

## 2024-01-28 RX ADMIN — FUROSEMIDE 40 MG: 40 TABLET ORAL at 10:01

## 2024-01-28 RX ADMIN — HEPARIN SODIUM 5000 UNITS: 5000 INJECTION INTRAVENOUS; SUBCUTANEOUS at 06:45

## 2024-01-28 RX ADMIN — ACETAMINOPHEN 650 MG: 325 TABLET ORAL at 21:10

## 2024-01-28 RX ADMIN — SODIUM CHLORIDE, PRESERVATIVE FREE 10 ML: 5 INJECTION INTRAVENOUS at 21:11

## 2024-01-28 RX ADMIN — DOXYCYCLINE 100 MG: 100 INJECTION, POWDER, LYOPHILIZED, FOR SOLUTION INTRAVENOUS at 03:47

## 2024-01-28 RX ADMIN — DOXYCYCLINE 100 MG: 100 INJECTION, POWDER, LYOPHILIZED, FOR SOLUTION INTRAVENOUS at 14:45

## 2024-01-28 RX ADMIN — ATORVASTATIN CALCIUM 10 MG: 10 TABLET, FILM COATED ORAL at 10:01

## 2024-01-28 RX ADMIN — AZTREONAM 2000 MG: 2 INJECTION, POWDER, LYOPHILIZED, FOR SOLUTION INTRAMUSCULAR; INTRAVENOUS at 22:23

## 2024-01-28 RX ADMIN — PANTOPRAZOLE SODIUM 40 MG: 40 TABLET, DELAYED RELEASE ORAL at 08:22

## 2024-01-28 RX ADMIN — AZTREONAM 2000 MG: 2 INJECTION, POWDER, LYOPHILIZED, FOR SOLUTION INTRAMUSCULAR; INTRAVENOUS at 11:34

## 2024-01-28 RX ADMIN — LATANOPROST 1 DROP: 50 SOLUTION OPHTHALMIC at 22:16

## 2024-01-28 ASSESSMENT — PAIN DESCRIPTION - FREQUENCY: FREQUENCY: INTERMITTENT

## 2024-01-28 ASSESSMENT — PAIN DESCRIPTION - DIRECTION: RADIATING_TOWARDS: NO

## 2024-01-28 ASSESSMENT — PAIN DESCRIPTION - PAIN TYPE: TYPE: CHRONIC PAIN

## 2024-01-28 ASSESSMENT — PAIN - FUNCTIONAL ASSESSMENT
PAIN_FUNCTIONAL_ASSESSMENT: NONE - DENIES PAIN
PAIN_FUNCTIONAL_ASSESSMENT: PREVENTS OR INTERFERES SOME ACTIVE ACTIVITIES AND ADLS

## 2024-01-28 ASSESSMENT — PAIN DESCRIPTION - ONSET: ONSET: GRADUAL

## 2024-01-28 ASSESSMENT — LIFESTYLE VARIABLES
HOW OFTEN DO YOU HAVE A DRINK CONTAINING ALCOHOL: NEVER
HOW MANY STANDARD DRINKS CONTAINING ALCOHOL DO YOU HAVE ON A TYPICAL DAY: PATIENT DOES NOT DRINK

## 2024-01-28 ASSESSMENT — PAIN DESCRIPTION - DESCRIPTORS: DESCRIPTORS: ACHING

## 2024-01-28 ASSESSMENT — PAIN SCALES - GENERAL
PAINLEVEL_OUTOF10: 0
PAINLEVEL_OUTOF10: 3

## 2024-01-28 ASSESSMENT — PAIN DESCRIPTION - LOCATION: LOCATION: GENERALIZED

## 2024-01-28 ASSESSMENT — PAIN DESCRIPTION - ORIENTATION: ORIENTATION: OTHER (COMMENT)

## 2024-01-28 NOTE — H&P
Internal Medicine Note H&P    Patient Name: Juan Vigil   Admit Date: 1/27/2024   Code:Prior  PCP: Chinedu Moreau MD   Attending: Wil Oden MD    Chief Complaint: Shortness of Breath (Pt c/o SOB and back pain today. Pt states that he slept throughout the night last night without home 5 L O2. Hx pulm fibrosis. Also c/o n/v/d for the last couple of days and has not been able to take meds )       Subjective   HPI:   Juan Vigil is a 89 y.o. male, presented with NV. Patient has a PMHx of A-fib, BPH, ILD, pulm fibrosis.  Presenting to the ED after nausea, and a slight productive cough.  He mentions these symptoms have been ongoing for the past 2 days and have gotten progressively worse.  He denies any headaches, dizziness, lightheadedness, chest pain, shortness of breath.  He mentions that he had a coughing spell which made him vomit.    At the ED, /95.  No pertinent labs were seen.  CXR showed bilateral prominent pulmonary interstitial opacities compatible with underlying pulmonary fibrosis.  CT chest without contrast showed new alveolar consolidation in the right lower lobe concerning for pneumonia.    Past Medical Hx:      Diagnosis Date    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     Atrial fibrillation (HCC) 2006    on event monitor    BPH (benign prostatic hyperplasia)     Gout     HTN (hypertension)     Hyperlipidemia     Hypothyroidism     Kidney stones     Pacemaker 2006       Past Surgical Hx:      Procedure Laterality Date    SKIN CANCER EXCISION      SKIN CANCER EXCISION  2012    melanoma in situ [place]    SPINE SURGERY  2007    L4-5    TIBIA FRACTURE SURGERY          Home Medication:  Prior to Admission medications    Medication Sig Start Date End Date Taking? Authorizing Provider   doxycycline hyclate (VIBRA-TABS) 100 MG tablet Take 1 tablet by mouth 2 times daily for 10 days 1/28/24 2/7/24 Yes Vladimir Yi DPM   omeprazole (PRILOSEC) 20 MG delayed release  Medical Conditions    #HLD  - Lipitor 10 mg qd    #Hypothyroidism  - Synthroid 112 mcg qd    #AFIB  -Xarelto 20 mg    #ILD/Chronic pulmonary fibrosis  - Albuterol neb 2.5 mg    #Hyperuricemia  - Febuxostat 80 mg qd    #BPH   - Home Finasteride 5 mg qd    DVT PPx: Xarelto  Diet: Regular diet  Code status:  Limited x4  ELOS: 3 days  Barriers to discharge: Pneumonia  Disposition  - Preadmission: Home  - Current: IP  - Upon discharge: Home    Was discussed with attending physician Wil Oden MD    The note was completed using EMR and Dragon dictation system. Every effort was made to ensure accuracy; however, inadvertent computerized transcription errors may be present.  ________________________  Chelsy Washington MD,   PGY-1, Internal Medicine  01/28/24  4:16 AM    Addendum to Resident H& P/Progress note:  I have personally seen,examined and evaluated the patient. I have reviewed the current history, physical findings, labs and assessment and plan and agree with note as documented by resident MD ( )      Wil Oden MD, FACP

## 2024-01-28 NOTE — ED PROVIDER NOTES
ED Attending Attestation Note     Date of evaluation: 1/27/2024    This patient was seen by the resident.  I have seen and examined the patient, agree with the workup, evaluation, management and diagnosis. The care plan has been discussed.  I have reviewed the ECG and concur with the resident's interpretation.  My assessment reveals a quite elderly gentleman, somewhat chronically ill-appearing, but awake, in no acute distress.  He is followed by pulmonology for significant pulmonary fibrosis, on 4-1/2 L nasal cannula oxygen supplementation at baseline, and has a number of other medical comorbidities.  He is currently in home hospice.  Yesterday and last night he apparently had difficulties with gripping of his nasal cannula, and was therefore unable to use his home oxygen through the night.  The hospice nurse replaced the nasal cannula at her visit this afternoon, and his shortness of breath has improved, but he feels generally weak, has been unable to take his medications for the last couple of days because of nausea, and has had several episodes of loose stools.    My examination, he has fine crackles throughout on lung exam consistent with pulmonary fibrosis.  Normal cardiovascular exam.  Abdomen is soft and entirely nontender.  He has good strength on testing in the bed, but according to his family member, present at the bedside, he was too weak to get out of bed today.  She is concerned for the possibility of urinary tract infection, as he has had similar symptoms with those in the past.       Anne Solares MD  01/27/24 1191

## 2024-01-28 NOTE — CARE COORDINATION
CM  following for  d/c planning:    CM  was  approached  by  pt  rob Dinero  , looking for  Social work .  Asking about  d/c planning  . Patient  was  just  brought up t the  floor  from  ER this AM  .    She states ( Rosette rob) , Patient  from home  alone  and  has  aide services  2 mornings a week  through what daughter Rosette says is  [employer] ,  CM  will need confirm during  business  hours. She  also states  he  had  Home  Hospice  services  through  [facility]  , she states \"he looked really bad and  called  hospice and cancelled and brought him to the  emergency room . ) She inquiring about  SNF placement for  rehab . With the  plan for him to return home again  once SNF stay completed.   CM  explained the  d/c planning process    She was  provided  a  SNF  list  and  per  pt  chart  , has  BCBS  and would  need  Pre cert  , ( she asked  about  3  Mn  but  pt  is not showing  Traditional Medicare. )    Patient  dgaleyda requested  a referral to the following  , CM to follow up with pt  preference and  choice  if agreeable.     1.)  [facility]  2.)  [facility]    PTOT pending  .        Admitted with Sepsis , D/T Pneumonia:  IV atbx    Supp O 2  4-5 L  nc  , cont to wean  .  PTOT      Electronically signed by Phyllis Kent RN on 1/28/2024 at 4:53 PM       Phyllis Kent RN Case Manager  [facility] [address]  [facility] [address] [phone]  Fax [phone]

## 2024-01-28 NOTE — PROGRESS NOTES
Internal Medicine Progress Note    Date: 1/28/2024  Patient: Juan Vigil  [place] [facility] Day: 0      CC: Shortness of breath    Interval Hx   VSS and NAEO.    Patient complains of generalized weakness, states he cannot get out of bed, non-productive cough . Otherwise no new complaints this morning.     HPI:   From H&P - \"Juan Vigil is a 89 y.o. male, presented with NV. Patient has a PMHx of A-fib, BPH, ILD, pulm fibrosis.  Presenting to the ED after nausea, and a slight productive cough.  He mentions these symptoms have been ongoing for the past 2 days and have gotten progressively worse.  He denies any headaches, dizziness, lightheadedness, chest pain, shortness of breath.  He mentions that he had a coughing spell which made him vomit.     At the ED, /95.  No pertinent labs were seen.  CXR showed bilateral prominent pulmonary interstitial opacities compatible with underlying pulmonary fibrosis.  CT chest without contrast showed new alveolar consolidation in the right lower lobe concerning for pneumonia.\"      Objective     Vital Signs:  Patient Vitals for the past 8 hrs:   BP Temp Temp src Pulse Resp SpO2 Height Weight   01/28/24 0445 135/74 97.7 °F (36.5 °C) Oral 82 18 98 % -- --   01/28/24 0331 (!) 144/76 98.7 °F (37.1 °C) Oral 80 20 97 % 1.85 m (6' 0.84\") 88.6 kg (195 lb 5.2 oz)       Physical Exam  Constitutional:       Appearance: Normal appearance. He is normal weight.   HENT:      Head: Normocephalic and atraumatic.      Nose: Nose normal. No rhinorrhea.      Mouth/Throat:      Mouth: Mucous membranes are moist.      Pharynx: Oropharynx is clear. No oropharyngeal exudate or posterior oropharyngeal erythema.   Eyes:      Extraocular Movements: Extraocular movements intact.      Conjunctiva/sclera: Conjunctivae normal.      Pupils: Pupils are equal, round, and reactive to light.   Cardiovascular:      Rate and Rhythm: Normal rate and regular rhythm.      Pulses: Normal pulses.      Heart sounds:

## 2024-01-28 NOTE — ED NOTES
ED TO INPATIENT SBAR HANDOFF    Patient Name: Juan Vigil   :  1931  89 y.o.   MRN:  [id number]  Preferred Name  Ed  ED Room #:  A09/A09-09  Family/Caregiver Present no   Restraints no   Sitter no   Sepsis Risk Score Sepsis Risk Score: 4.76    Situation  Code Status: Prior No additional code details.    Allergies: Allopurinol, Penicillins, Chocolate, Ciprofloxacin, Iodides, Sulfa antibiotics, Sulfamethoxazole-trimethoprim, and Cephalosporins  Weight: Patient Vitals for the past 96 hrs (Last 3 readings):   Weight   24 2125 90.1 kg (198 lb 11.2 oz)     Arrived from: home  Chief Complaint:   Chief Complaint   Patient presents with    Shortness of Breath     Pt c/o SOB and back pain today. Pt states that he slept throughout the night last night without home 5 L O2. Hx pulm fibrosis. Also c/o n/v/d for the last couple of days and has not been able to take meds      Hospital Problem/Diagnosis:  Principal Problem:    Sepsis due to pneumonia (HCC)  Resolved Problems:    * No resolved hospital problems. *    Imaging:   CT Chest W/O Contrast   Final Result      New alveolar consolidation in the right lower lobe concerning for pneumonia.      Extensive pulmonary fibrosis.      Electronically signed by Shubham Baum MD      XR CHEST PORTABLE   Final Result      Bilateral prominent pulmonary interstitial opacities compatible with underlying pulmonary fibrosis. Superimposed infiltrate not excluded.      Electronically signed by Tyler Sewell MD        Abnormal labs:   Abnormal Labs Reviewed   CBC WITH AUTO DIFFERENTIAL - Abnormal; Notable for the following components:       Result Value    WBC 21.6 (*)     Hemoglobin 12.9 (*)     Hematocrit 40.0 (*)     RDW 15.8 (*)     Neutrophils Absolute 19.1 (*)     Lymphocytes Absolute 0.9 (*)     Monocytes Absolute 1.5 (*)     All other components within normal limits    Narrative:     CALL  Michelle  [facility] tel. [phone],  Rejected BMPX, BNP, TRP/Called to: MARIO HERNANDEZ, 2024 
Repeat green top sent at 2541     Ryan Lackey RN  01/27/24 6886    
Patient information on fall and injury prevention

## 2024-01-28 NOTE — DISCHARGE INSTRUCTIONS
1) We are sending you home with an oral antibiotic, doxycycline. Please continue taking one tablet by mouth 2 times daily for 5 more days, with the last day being 02/03. We are also ordering an oral steroid, prednisone. Please take 2 tablets by mouth daily for 5 days, with the last day being 02/03.     2) We are changing how you take your blood thinner, Xarelto. STOP taking one 20 mg tablet daily. START taking one 15 mg tablet daily with breakfast.     3) We are otherwise not making any changes to your medications. Please continue taking the rest of your meds as prescribed.    4) Please follow up with Dr. Moreau in 1-2 weeks after discharge. Please follow up with Dr. Luu within 1 month after discharge.    5) For your home oxygen, be sure to titrate your flow level to reach an SpO2 goal of 88-92%.     6) You should return to the emergency department if your symptoms recur, worsen, or do not resolve. In addition, return if:  You have a fever (greater than 101 degrees F),  You have chest pain, shortness of breath, abdominal pain, or uncontrollable vomiting,  You are unable to eat or drink,  You pass out,  You have difficulty moving your arms or legs,   You have difficulty speaking or slurred speech,  Or, you have any concern that you feel needs acute physician evaluation.    7) Thank you for allowing us to take care of you during your stay at [facility]. Have a great rest of your week!

## 2024-01-28 NOTE — ED PROVIDER NOTES
[facility]  EMERGENCY DEPARTMENT ENCOUNTER          [facility] RESIDENT NOTE       Date of evaluation: 1/27/2024    Chief Complaint     Shortness of Breath (Pt c/o SOB and back pain today. Pt states that he slept throughout the night last night without home 5 L O2. Hx pulm fibrosis. Also c/o n/v/d for the last couple of days and has not been able to take meds )      History of Present Illness     Juan Vigil is a 89 y.o. male who presents to [facility] ED today with the chief complaint of shortness of breath with nausea and vomiting for the past few days. He states that he felt like the morning he was unable to catch his breath. He describes having the feeling of not being able to move because his leg were heavy. He admits to having a history of pulmonary fibrosis. He also admits to having nausea and vomiting for the past few days and has been unable to take his medications like normal.    ASSESSMENT / PLAN  (MEDICAL DECISION MAKING)     INITIAL VITALS: BP: (!) 151/95, Temp: 99.7 °F (37.6 °C), Pulse: 76, Respirations: 16, SpO2: 98 %     Juan iVgil is a 89 y.o. male who presents to [facility] ED today with the chief complaint of shortness of breath with nausea and vomiting for the past few days. He states that he felt like the morning he was unable to catch his breath. Patient has a history of pulmonary fibrosis and a chronic cough. He is normally on 5 L NC at home.     On arrival the patient is hemodynamically stable with a slightly elevated blood pressure otherwise his vital signs are stable. EKG reveals no acute changes. Patient does have a pacemaker in place for a history of A fib.     CBC reveals an increase in WBC. Blood venous gas is normal. Chest XR was found to have bilateral prominent pulmonary interstitial opacities compatible with underlying pulmonary fibrosis; superimposed infiltrate not exclude. It was decided that a CT chest was indicated to determine if there are any new changes concerning for       Pupils: Pupils are equal, round, and reactive to light.   Cardiovascular:      Rate and Rhythm: Normal rate and regular rhythm.      Pulses: Normal pulses.      Comments: Patient has a history of A fib with a pacemaker in place  Pulmonary:      Effort: Pulmonary effort is normal.      Breath sounds: Normal breath sounds. No decreased breath sounds, wheezing, rhonchi or rales.   Abdominal:      Palpations: Abdomen is soft.      Tenderness: There is no abdominal tenderness.   Musculoskeletal:         General: Normal range of motion.      Cervical back: Normal range of motion.   Skin:     General: Skin is warm and dry.      Capillary Refill: Capillary refill takes less than 2 seconds.   Neurological:      General: No focal deficit present.      Mental Status: He is alert and oriented to person, place, and time.   Psychiatric:         Mood and Affect: Mood normal.           Vladimir Yi DPM  Resident  01/28/24 0126

## 2024-01-29 LAB
ANION GAP SERPL CALCULATED.3IONS-SCNC: 8 MMOL/L (ref 3–16)
BASOPHILS # BLD: 0 K/UL (ref 0–0.2)
BASOPHILS NFR BLD: 0.4 %
BUN SERPL-MCNC: 43 MG/DL (ref 7–20)
CALCIUM SERPL-MCNC: 10 MG/DL (ref 8.3–10.6)
CHLORIDE SERPL-SCNC: 104 MMOL/L (ref 99–110)
CO2 SERPL-SCNC: 24 MMOL/L (ref 21–32)
CREAT SERPL-MCNC: 3.1 MG/DL (ref 0.8–1.3)
DEPRECATED RDW RBC AUTO: 15.4 % (ref 12.4–15.4)
EOSINOPHIL # BLD: 0.3 K/UL (ref 0–0.6)
EOSINOPHIL NFR BLD: 2.7 %
GFR SERPLBLD CREATININE-BSD FMLA CKD-EPI: 18 ML/MIN/{1.73_M2}
GLUCOSE SERPL-MCNC: 120 MG/DL (ref 70–99)
HCT VFR BLD AUTO: 36.7 % (ref 40.5–52.5)
HGB BLD-MCNC: 11.8 G/DL (ref 13.5–17.5)
LEGIONELLA AG UR QL: NORMAL
LYMPHOCYTES # BLD: 1.3 K/UL (ref 1–5.1)
LYMPHOCYTES NFR BLD: 10.3 %
MAGNESIUM SERPL-MCNC: 1.8 MG/DL (ref 1.8–2.4)
MCH RBC QN AUTO: 26.9 PG (ref 26–34)
MCHC RBC AUTO-ENTMCNC: 32.2 G/DL (ref 31–36)
MCV RBC AUTO: 83.5 FL (ref 80–100)
MONOCYTES # BLD: 0.8 K/UL (ref 0–1.3)
MONOCYTES NFR BLD: 5.9 %
MRSA DNA SPEC QL NAA+PROBE: NORMAL
NEUTROPHILS # BLD: 10.6 K/UL (ref 1.7–7.7)
NEUTROPHILS NFR BLD: 80.7 %
PLATELET # BLD AUTO: 164 K/UL (ref 135–450)
PMV BLD AUTO: 7.7 FL (ref 5–10.5)
POTASSIUM SERPL-SCNC: 3.9 MMOL/L (ref 3.5–5.1)
PROCALCITONIN SERPL IA-MCNC: 1.02 NG/ML (ref 0–0.15)
RBC # BLD AUTO: 4.4 M/UL (ref 4.2–5.9)
S PNEUM AG UR QL: NORMAL
SODIUM SERPL-SCNC: 136 MMOL/L (ref 136–145)
WBC # BLD AUTO: 13.1 K/UL (ref 4–11)

## 2024-01-29 PROCEDURE — 2580000003 HC RX 258

## 2024-01-29 PROCEDURE — 85025 COMPLETE CBC W/AUTO DIFF WBC: CPT

## 2024-01-29 PROCEDURE — 36415 COLL VENOUS BLD VENIPUNCTURE: CPT

## 2024-01-29 PROCEDURE — 94761 N-INVAS EAR/PLS OXIMETRY MLT: CPT

## 2024-01-29 PROCEDURE — 97530 THERAPEUTIC ACTIVITIES: CPT

## 2024-01-29 PROCEDURE — 2700000000 HC OXYGEN THERAPY PER DAY

## 2024-01-29 PROCEDURE — 83735 ASSAY OF MAGNESIUM: CPT

## 2024-01-29 PROCEDURE — 97116 GAIT TRAINING THERAPY: CPT

## 2024-01-29 PROCEDURE — 2500000003 HC RX 250 WO HCPCS

## 2024-01-29 PROCEDURE — 6370000000 HC RX 637 (ALT 250 FOR IP)

## 2024-01-29 PROCEDURE — 6360000002 HC RX W HCPCS

## 2024-01-29 PROCEDURE — 80048 BASIC METABOLIC PNL TOTAL CA: CPT

## 2024-01-29 PROCEDURE — 97535 SELF CARE MNGMENT TRAINING: CPT

## 2024-01-29 PROCEDURE — 99223 1ST HOSP IP/OBS HIGH 75: CPT | Performed by: INTERNAL MEDICINE

## 2024-01-29 PROCEDURE — 94640 AIRWAY INHALATION TREATMENT: CPT

## 2024-01-29 PROCEDURE — 84145 PROCALCITONIN (PCT): CPT

## 2024-01-29 PROCEDURE — 97162 PT EVAL MOD COMPLEX 30 MIN: CPT

## 2024-01-29 PROCEDURE — 97166 OT EVAL MOD COMPLEX 45 MIN: CPT

## 2024-01-29 PROCEDURE — 99232 SBSQ HOSP IP/OBS MODERATE 35: CPT | Performed by: HOSPITALIST

## 2024-01-29 PROCEDURE — 1200000000 HC SEMI PRIVATE

## 2024-01-29 RX ORDER — POLYETHYLENE GLYCOL 3350 17 G/17G
17 POWDER, FOR SOLUTION ORAL DAILY
Status: DISCONTINUED | OUTPATIENT
Start: 2024-01-29 | End: 2024-01-30 | Stop reason: HOSPADM

## 2024-01-29 RX ORDER — SODIUM CHLORIDE, SODIUM LACTATE, POTASSIUM CHLORIDE, CALCIUM CHLORIDE 600; 310; 30; 20 MG/100ML; MG/100ML; MG/100ML; MG/100ML
INJECTION, SOLUTION INTRAVENOUS CONTINUOUS
Status: ACTIVE | OUTPATIENT
Start: 2024-01-29 | End: 2024-01-29

## 2024-01-29 RX ORDER — ALBUTEROL SULFATE 2.5 MG/3ML
1.25 SOLUTION RESPIRATORY (INHALATION) 3 TIMES DAILY PRN
Status: DISCONTINUED | OUTPATIENT
Start: 2024-01-29 | End: 2024-01-29

## 2024-01-29 RX ORDER — SODIUM CHLORIDE, SODIUM LACTATE, POTASSIUM CHLORIDE, AND CALCIUM CHLORIDE .6; .31; .03; .02 G/100ML; G/100ML; G/100ML; G/100ML
500 INJECTION, SOLUTION INTRAVENOUS ONCE
Status: COMPLETED | OUTPATIENT
Start: 2024-01-29 | End: 2024-01-29

## 2024-01-29 RX ORDER — ALBUTEROL SULFATE 2.5 MG/3ML
2.5 SOLUTION RESPIRATORY (INHALATION)
Status: DISCONTINUED | OUTPATIENT
Start: 2024-01-29 | End: 2024-01-30 | Stop reason: HOSPADM

## 2024-01-29 RX ADMIN — SODIUM CHLORIDE 30 MG/ML INHALATION SOLUTION 4 ML: 30 SOLUTION INHALANT at 21:48

## 2024-01-29 RX ADMIN — SODIUM CHLORIDE, POTASSIUM CHLORIDE, SODIUM LACTATE AND CALCIUM CHLORIDE: 600; 310; 30; 20 INJECTION, SOLUTION INTRAVENOUS at 11:31

## 2024-01-29 RX ADMIN — SODIUM CHLORIDE 30 MG/ML INHALATION SOLUTION 4 ML: 30 SOLUTION INHALANT at 08:37

## 2024-01-29 RX ADMIN — SODIUM CHLORIDE, POTASSIUM CHLORIDE, SODIUM LACTATE AND CALCIUM CHLORIDE 500 ML: 600; 310; 30; 20 INJECTION, SOLUTION INTRAVENOUS at 10:53

## 2024-01-29 RX ADMIN — DOXYCYCLINE 100 MG: 100 INJECTION, POWDER, LYOPHILIZED, FOR SOLUTION INTRAVENOUS at 02:01

## 2024-01-29 RX ADMIN — SODIUM CHLORIDE, PRESERVATIVE FREE 10 ML: 5 INJECTION INTRAVENOUS at 20:59

## 2024-01-29 RX ADMIN — PANTOPRAZOLE SODIUM 40 MG: 40 TABLET, DELAYED RELEASE ORAL at 06:22

## 2024-01-29 RX ADMIN — RIVAROXABAN 15 MG: 15 TABLET, FILM COATED ORAL at 17:55

## 2024-01-29 RX ADMIN — LATANOPROST 1 DROP: 50 SOLUTION OPHTHALMIC at 20:52

## 2024-01-29 RX ADMIN — ALBUTEROL SULFATE 2.5 MG: 2.5 SOLUTION RESPIRATORY (INHALATION) at 08:37

## 2024-01-29 RX ADMIN — LEVOTHYROXINE SODIUM 112 MCG: 0.11 TABLET ORAL at 06:22

## 2024-01-29 RX ADMIN — ALBUTEROL SULFATE 2.5 MG: 2.5 SOLUTION RESPIRATORY (INHALATION) at 21:48

## 2024-01-29 RX ADMIN — SODIUM CHLORIDE, POTASSIUM CHLORIDE, SODIUM LACTATE AND CALCIUM CHLORIDE: 600; 310; 30; 20 INJECTION, SOLUTION INTRAVENOUS at 11:26

## 2024-01-29 RX ADMIN — DOXYCYCLINE 100 MG: 100 INJECTION, POWDER, LYOPHILIZED, FOR SOLUTION INTRAVENOUS at 14:48

## 2024-01-29 RX ADMIN — MIRTAZAPINE 15 MG: 15 TABLET, FILM COATED ORAL at 20:43

## 2024-01-29 RX ADMIN — ATORVASTATIN CALCIUM 10 MG: 10 TABLET, FILM COATED ORAL at 08:14

## 2024-01-29 RX ADMIN — FUROSEMIDE 40 MG: 40 TABLET ORAL at 08:14

## 2024-01-29 RX ADMIN — POLYETHYLENE GLYCOL 3350 17 G: 17 POWDER, FOR SOLUTION ORAL at 10:21

## 2024-01-29 RX ADMIN — AZTREONAM 2000 MG: 2 INJECTION, POWDER, LYOPHILIZED, FOR SOLUTION INTRAMUSCULAR; INTRAVENOUS at 23:33

## 2024-01-29 RX ADMIN — SODIUM CHLORIDE, PRESERVATIVE FREE 10 ML: 5 INJECTION INTRAVENOUS at 08:14

## 2024-01-29 RX ADMIN — TEMAZEPAM 15 MG: 15 CAPSULE ORAL at 20:43

## 2024-01-29 RX ADMIN — AZTREONAM 2000 MG: 2 INJECTION, POWDER, LYOPHILIZED, FOR SOLUTION INTRAMUSCULAR; INTRAVENOUS at 12:59

## 2024-01-29 NOTE — PROGRESS NOTES
4 Eyes Skin Assessment     NAME:  Juan Vigil  YOB: 1931  MEDICAL RECORD NUMBER:  [id number]    The patient is being assessed for  Admission    I agree that at least one RN has performed a thorough Head to Toe Skin Assessment on the patient. ALL assessment sites listed below have been assessed.      Areas assessed by both nurses:    Head, Face, Ears, Shoulders, Back, Chest, Arms, Elbows, Hands, Sacrum. Buttock, Coccyx, Ischium, Legs. Feet and Heels, and Under Medical Devices         Does the Patient have a Wound? No noted wound(s)       Henrique Prevention initiated by RN: Yes  Wound Care Orders initiated by RN: NO    Pressure Injury (Stage 3,4, Unstageable, DTI, NWPT, and Complex wounds) if present, place Wound referral order by RN under : No    New Ostomies, if present place, Ostomy referral order under : No     Nurse 1 eSignature: Electronically signed by Emile Navarro RN on 1/28/24 at 7:29 PM EST    **SHARE this note so that the co-signing nurse can place an eSignature**    Nurse 2 eSignature: Electronically signed by Nia Oh RN on 1/28/24 at 7:32 PM EST

## 2024-01-29 NOTE — CONSULTS
Initial Pulmonary & Critical Care Consult Note      Reason for Consult: CAP on ILD  Requesting Physician: Dr. 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 He was admitted on 1/28/34 when he presented to the ED with worsening shortness of breath as well as cough.     On arrival, he was afebrile, and was saturating 98% on 5 L NC. Lab work remarkable for leukocytosis on 21.6k, Infectious workup including blood cultures, MRSA, Influenza and and COVID are negative. VBG 7.380/43.0/31.0/25.4. CXR showed BL interstitial opacities. CT Chest wo contrast revealed a new consolidation in the Right lower lobe concerning for a PNA as well as extensive pulmonary fibrosis.     He is allergic to multiple antibiotics and is on Vibramycin and Azactam currently.      On reviewing notes, he is an established patient at Dr. Luu's office and was last seen in December of last year. He reports that he is short of breath at baseline and has a cough, he was not sure what triggered this episode but told me that he cannot walk more than a few steps without getting short of breath. He denies any fevers or chills prior to presentation. On reviewing chart, he had enrolled with hospice. He lives at home and reports that he works out/lifts weights and has made a 21 minute regimen for himself to follow.     Today he appeared to be comfortable and reports that he feels at his baseline. We talked about how he had trained to run in [employer] in his mid 40's and was running 10-30  miles a week.     His chest exam reveals diffuse inspiratory crackles more prominent on the right lower lobe than the left.     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     Atrial      Plan to continue the antibiotics for now, if he continues to do well, can potentially discharge the patient tomorrow on Oral Levofloxacin. Discussed with the patient to keep using the oxygen in the AM as he needs it instead of nightly. He should get a CT Scan of the chest in 6 weeks time to make sure that the consolidation resolves and it is not a mass. He should follow up with Dr. Luu on discharge.     The patient will be discussed with Dr. Fox.     Jacob Pham MD  PGY-3, Internal Medicine    PULMONARY AND CRITICAL CARE ATTENDING:  Patient was seen, examined and discussed with Dr. Pham PGY-3. I agree with the history of present illness, past medical/surgical histories, family history, social history, medication list and allergies as listed. The review of systems is as noted above. My physical exam confirms the findings listed above.   Chart was reviewed including Labs, CXR, CT scan, EKG, and Medical records confirm the findings noted above.   I edited the note where appropriate.    Briefly, this is a 89 y.o. male admitted to the hospital for acute worsening shortness of breath and increased cough.  Patient has no history of IPF/UIP and is currently enrolled in hospice.  CT imaging revealed new right lower lobe consolidation,lab work positive for leukocytosis      ASSESSMENT:  ILD -IPF/UIP  Chronic hypoxic respiratory failure  Right lower lobe pneumonia  Chronic deconditioning    PLAN:  Provide O2 supplementation to keep SpO2 between 88-92% if needed.    Continue broad-spectrum antibiotics, discussed current regimen with Dr. Oden, patient has several drug allergies which makes it difficult to find an appropriate outpatient regimen.  As needed bronchodilators  Prednisone 40 mg daily for 5 days  Out of bed and up to chair as tolerated, physical therapy     Keith Eckert \"Jose Alfredo\" Ruddy Tamayo MD  Pulmonary and Critical Care Medicine

## 2024-01-29 NOTE — PROGRESS NOTES
Physician Progress Note      PATIENT:               EVI GOMEZ  CSN #:                  [id number]  :                       1931  ADMIT DATE:       2024 9:18 PM  DISCH DATE:  RESPONDING  PROVIDER #:        RERE PRATHER          QUERY TEXT:    Pt admitted with PNA.  Noted documentation of Sepsis due to pneumonia in ED   PN.  If possible, please document in progress notes and discharge summary:      The medical record reflects the following:  Risk Factors: 89 y/o with Pneumonia  Clinical Indicators: ED PN: \"Sepsis due to Pneumonia\" SIRS: WBC 21.6 RR 33  Treatment: Azactam, LR bolus 500 ml, blood cultures x2  Options provided:  -- Sepsis confirmed present on admission  -- Sepsis ruled out  -- Other - I will add my own diagnosis  -- Disagree - Not applicable / Not valid  -- Disagree - Clinically unable to determine / Unknown  -- Refer to Clinical Documentation Reviewer    PROVIDER RESPONSE TEXT:    The diagnosis of Sepsis was ruled out.    Query created by: Ashlie Quiñones on 2024 11:52 AM      Electronically signed by:  RERE PRATHER 2024 1:03 PM

## 2024-01-29 NOTE — PROGRESS NOTES
Occupational Therapy  Facility/Department: [address]  Occupational Therapy Initial Assessment, Treatment and D/c Note     Name: Juan Vigil  : 1931  MRN: [id number]  Date of Service: 2024    Discharge Recommendations:  Home with assist PRN  OT Equipment Recommendations  Equipment Needed: No       Patient Diagnosis(es): The primary encounter diagnosis was Pneumonia of right lower lobe due to infectious organism. Diagnoses of Debility and Pulmonary fibrosis (HCC) were also pertinent to this visit.  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  Past Surgical History:  has a past surgical history that includes Spine surgery (); Skin cancer excision; Tibia fracture surgery; and Skin cancer excision ().     Assessment   Assessment: Pt from home on home hospice with HHA/ family to assist prn. Pt wears O2 on/off at 4L. Pt appears to be functioning very close / near baseline level. Pt demo functional mobility in room/ hallway with SBA/ supervision and LE ADLs with setup.  No ongoing OT indicated at d/c. Will sign off from OT services.  No DME needs. Pt plans for home with prior level of assist at d/c.  Decision Making: Medium Complexity  No Skilled OT: Safe to return home;No OT goals identified  REQUIRES OT FOLLOW-UP: No  Activity Tolerance  Activity Tolerance: Patient Tolerated treatment well  Activity Tolerance Comments: Pt requires several standing rest 10-15 sec with supervision during functional mobility - Pt O2 sat on 4L O2 - 94%.        Plan D/c Acute OT services       Restrictions  Position Activity Restriction  Other position/activity restrictions: up with assist    Subjective   General  Chart Reviewed: Yes  Patient assessed for rehabilitation services?: Yes  Additional Pertinent Hx: Admit  with

## 2024-01-29 NOTE — PLAN OF CARE
Problem: Discharge Planning  Goal: Discharge to home or other facility with appropriate resources  1/28/2024 2322 by Emile Navarro, RN  Outcome: Progressing     Problem: Safety - Adult  Goal: Free from fall injury  1/28/2024 2322 by Emile Navarro, RN  Outcome: Progressing     Problem: ABCDS Injury Assessment  Goal: Absence of physical injury  1/28/2024 2322 by Emile Navarro, RN  Outcome: Progressing     Problem: Pain  Goal: Verbalizes/displays adequate comfort level or baseline comfort level  Outcome: Progressing

## 2024-01-29 NOTE — CARE COORDINATION
Case Management Assessment  Initial Evaluation    Date/Time of Evaluation: 1/29/2024 5:00 PM  Assessment Completed by: ROBERTA HORTON    If patient is discharged prior to next notation, then this note serves as note for discharge by case management.    Patient Name: Juan Vigil                   YOB: 1931  Diagnosis: Pulmonary fibrosis (HCC) [J84.10]  Debility [R53.81]  Sepsis due to pneumonia (HCC) [J18.9, A41.9]  Pneumonia of right lower lobe due to infectious organism [J18.9]                   Date / Time: 1/27/2024  9:18 PM    Patient Admission Status: Inpatient   Readmission Risk (Low < 19, Mod (19-27), High > 27): Readmission Risk Score: 17.3    Current PCP: Chinedu Moreau MD  PCP verified by CM? Yes    Chart Reviewed: Yes      History Provided by: Patient  Patient Orientation: Alert and Oriented    Patient Cognition: Alert    Hospitalization in the last 30 days (Readmission):  No    If yes, Readmission Assessment in CM Navigator will be completed.    Advance Directives:      Code Status: Limited   Patient's Primary Decision Maker is: Legal Next of Kin    Primary Decision Maker: Rosette Vigil - Child - [phone]    Primary Decision Maker: Gudelia Vigil - Child - [phone]    Discharge Planning:    Patient lives with: Alone Type of Home: House  Primary Care Giver: Self  Patient Support Systems include: Hospice, Homemaker   Current Financial resources: Medicare  Current community resources: Hospice ([facility])  Current services prior to admission: Hospice Services, Oxygen Therapy, Private Duty Homecare            Current DME:              Type of Home Care services:  Aide Services, Hospice, Housekeeping    ADLS  Prior functional level: Independent in ADLs/IADLs  Current functional level: Assistance with the following:, Mobility    PT AM-PAC: 18 /24  OT AM-PAC: 22 /24    Family can provide assistance at DC: Yes  Would you like Case Management to discuss the discharge plan with any other family

## 2024-01-29 NOTE — PROGRESS NOTES
Physical Therapy  Facility/Department: [address]  Physical Therapy Initial Assessment and treatment    Name: Juan Vigil  : 1931  MRN: [id number]  Date of Service: 2024    Discharge Recommendations:  Home with Home health PT   PT Equipment Recommendations  Equipment Needed: No      Patient Diagnosis(es): The primary encounter diagnosis was Pneumonia of right lower lobe due to infectious organism. Diagnoses of Debility and Pulmonary fibrosis (HCC) were also pertinent to this visit.  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  Past Surgical History:  has a past surgical history that includes Spine surgery (); Skin cancer excision; Tibia fracture surgery; and Skin cancer excision ().    Assessment   Body Structures, Functions, Activity Limitations Requiring Skilled Therapeutic Intervention: Decreased functional mobility ;Decreased endurance;Decreased balance  Assessment: Patient tolerated session well, transferring and ambulating in room and hallways as above with fairly steady gait using FWW.  Patient demonstrates one loss of balance in bathroom (tight spce with walker, O2 and IV pole) but is able to correct with CGA.  Patient requires seated rest breaks throughout session and demonstrates mild dyspnea on exertion.  O2 saturation 92% after ambulating to/from bathroom (and performing grooming at sink) and 94% after ambulation in hallways on 4L.  Patient has O2 at home but typically does not wear with mobility despite previous recommendations; patient encouraged to utilize O2 and directed at home; he verbalized understanding.  Patient admitted from home where he is modified independent with mobility, has assist 3x/week with showers and IADLs.  Patient currently functioning slightly below baseline level

## 2024-01-29 NOTE — DISCHARGE SUMMARY
INTERNAL MEDICINE DEPARTMENT AT THE [facility]  DISCHARGE SUMMARY    Patient ID: Juan Vigil                                             Discharge Date: 01/30/2024   Patient's PCP: Chinedu Moreau MD                                          Discharge Physician: Wil Oden MD  Admit Date: 1/27/2024   Admitting Physician: Wil Oden MD    PROBLEMS DURING HOSPITALIZATION:  Present on Admission:   Pneumonia of right lower lobe due to infectious organism   Pulmonary fibrosis (HCC)   Chronic hypoxemic respiratory failure (HCC)   CKD (chronic kidney disease) stage 4, GFR 15-29 ml/min (HCC)   Debility      Problem-Based Hospital Course:  Juan Vigil is a 89 y.o. male w/ MHx A-fib on Xarelto, idiopathic pulmonary fibrosis on nighttime 5L O2, CKD IV, BPH, hypothyroidism who p/w fatigue, nausea, and productive cough. He was found to have a leukocytosis. CT imaging of the chest showed consolidation in the right lower lung lobe concerning for pneumonia. He was started on IV doxycycline and aztreonam due to his extensive antibiotic allergies. Pulmonology was consulted, made additional recommendations for a course of oral steroids and a repeat chest CT in 6 weeks to confirm resolution. He remained on his baseline oxygen requirement, his leukocytosis resolved, and his PT/OT evaluation showed he was appropriate for discharge home. He was deemed medically stable on 01/30 and discharged home with home health care and a course of oral doxycycline and prednisone.    Nausea/vomiting  Cough  2/2 RLL CAP  Chronic hypoxemic respiratory failure  Idiopathic pulmonary fibrosis  Leukocytosis  Sepsis ruled out  2 days of symptoms, denies increased SOB, chest pain, fevers. Endorsing fatigue/generalized weakness. Afebrile, with leukocytosis, no increased O2 requirement. CT imaging showing new RLL opacity. Sepsis ruled out.  - WBC 8.8<13.1<21.6  - Blood cultures NGTD  - Sputum cultures not collected due to lack of sputum

## 2024-01-29 NOTE — PROGRESS NOTES
Internal Medicine Progress Note    Date: 1/29/2024  Patient: Juan Vigil  Hospital Day: 1      CC: Shortness of breath    Interval Hx   VSS and NAEO. On 4L NC    Patient has no new complaints this morning. He states his breathing is improving, still feeling weak. Has had no more nausea or vomiting.     HPI:   From H&P - \"Juan Vigil is a 89 y.o. male, presented with NV. Patient has a PMHx of A-fib, BPH, ILD, pulm fibrosis.  Presenting to the ED after nausea, and a slight productive cough.  He mentions these symptoms have been ongoing for the past 2 days and have gotten progressively worse.  He denies any headaches, dizziness, lightheadedness, chest pain, shortness of breath.  He mentions that he had a coughing spell which made him vomit.     At the ED, /95.  No pertinent labs were seen.  CXR showed bilateral prominent pulmonary interstitial opacities compatible with underlying pulmonary fibrosis.  CT chest without contrast showed new alveolar consolidation in the right lower lobe concerning for pneumonia.\"      Objective     Vital Signs:  Patient Vitals for the past 8 hrs:   BP Temp Temp src Pulse Resp SpO2 Weight   01/29/24 0841 -- -- -- -- -- 95 % --   01/29/24 0812 108/65 97.6 °F (36.4 °C) Oral 62 18 98 % --   01/29/24 0600 -- -- -- -- -- -- 88.5 kg (195 lb)   01/29/24 0354 130/76 97.2 °F (36.2 °C) Oral 68 18 94 % --         Physical Exam  Constitutional:       Appearance: Normal appearance. He is normal weight.   HENT:      Head: Normocephalic and atraumatic.      Nose: Nose normal. No rhinorrhea.      Mouth/Throat:      Mouth: Mucous membranes are moist.      Pharynx: Oropharynx is clear. No oropharyngeal exudate or posterior oropharyngeal erythema.   Eyes:      Extraocular Movements: Extraocular movements intact.      Conjunctiva/sclera: Conjunctivae normal.      Pupils: Pupils are equal, round, and reactive to light.   Cardiovascular:      Rate and Rhythm: Normal rate and regular rhythm.       Pulses: Normal pulses.      Heart sounds: Normal heart sounds. No murmur heard.     No friction rub. No gallop.   Pulmonary:      Effort: Pulmonary effort is normal.      Comments: Coarse crackles present diffusely in all lung fields.  Diminished air movement throughout.  No stridor or wheezing.   Abdominal:      General: Abdomen is flat. Bowel sounds are normal. There is no distension.      Palpations: Abdomen is soft. There is no mass.      Tenderness: There is no abdominal tenderness.   Musculoskeletal:      Right lower leg: No edema.      Left lower leg: No edema.   Skin:     General: Skin is warm and dry.   Neurological:      General: No focal deficit present.      Mental Status: He is alert and oriented to person, place, and time. Mental status is at baseline.   Psychiatric:         Mood and Affect: Mood normal.         Thought Content: Thought content normal.           Intake/Output Summary (Last 24 hours) at 1/29/2024 0913  Last data filed at 1/28/2024 1600  Gross per 24 hour   Intake 600 ml   Output 250 ml   Net 350 ml       Labs:  CBC:   Recent Labs     01/27/24  2203 01/29/24  0758   WBC 21.6* 13.1*   HGB 12.9* 11.8*   HCT 40.0* 36.7*    164       BMP:   Recent Labs     01/27/24  2225 01/28/24  0621 01/29/24  0758    136 136   K 4.2 4.4 3.9    103 104   CO2 19* 22 24   BUN 31* 33* 43*   CREATININE 2.0* 2.4* 3.1*   GLUCOSE 149* 219* 120*       Magnesium:   Recent Labs     01/28/24  0621 01/29/24  0758   MG 1.80 1.80       LFT's: No results for input(s): \"AST\", \"ALT\", \"ALB\", \"BILITOT\", \"ALKPHOS\" in the last 72 hours.      U/A:   Recent Labs     01/27/24  2342   COLORU Yellow   PHUR 6.0   WBCUA 0-2   RBCUA 5-10*   BACTERIA 2+*   CLARITYU Clear   SPECGRAV 1.025   LEUKOCYTESUR Negative   UROBILINOGEN 0.2   BILIRUBINUR Negative   BLOODU SMALL*   GLUCOSEU Negative         Radiology:  CT Chest W/O Contrast   Final Result      New alveolar consolidation in the right lower lobe concerning for

## 2024-01-30 VITALS
HEIGHT: 73 IN | DIASTOLIC BLOOD PRESSURE: 79 MMHG | SYSTOLIC BLOOD PRESSURE: 138 MMHG | BODY MASS INDEX: 25.84 KG/M2 | OXYGEN SATURATION: 97 % | TEMPERATURE: 97.6 F | HEART RATE: 61 BPM | RESPIRATION RATE: 18 BRPM | WEIGHT: 195 LBS

## 2024-01-30 LAB
ANION GAP SERPL CALCULATED.3IONS-SCNC: 10 MMOL/L (ref 3–16)
BACTERIA URNS QL MICRO: ABNORMAL /HPF
BASOPHILS # BLD: 0 K/UL (ref 0–0.2)
BASOPHILS NFR BLD: 0.4 %
BILIRUB UR QL STRIP.AUTO: NEGATIVE
BUN SERPL-MCNC: 46 MG/DL (ref 7–20)
CALCIUM SERPL-MCNC: 10 MG/DL (ref 8.3–10.6)
CHLORIDE SERPL-SCNC: 106 MMOL/L (ref 99–110)
CLARITY UR: CLEAR
CO2 SERPL-SCNC: 23 MMOL/L (ref 21–32)
COLOR UR: YELLOW
CREAT SERPL-MCNC: 2.8 MG/DL (ref 0.8–1.3)
DEPRECATED RDW RBC AUTO: 15.5 % (ref 12.4–15.4)
EOSINOPHIL # BLD: 0.7 K/UL (ref 0–0.6)
EOSINOPHIL NFR BLD: 7.9 %
EPI CELLS #/AREA URNS HPF: ABNORMAL /HPF (ref 0–5)
GFR SERPLBLD CREATININE-BSD FMLA CKD-EPI: 21 ML/MIN/{1.73_M2}
GLUCOSE SERPL-MCNC: 120 MG/DL (ref 70–99)
GLUCOSE UR STRIP.AUTO-MCNC: NEGATIVE MG/DL
HCT VFR BLD AUTO: 37 % (ref 40.5–52.5)
HGB BLD-MCNC: 11.8 G/DL (ref 13.5–17.5)
HGB UR QL STRIP.AUTO: NEGATIVE
KETONES UR STRIP.AUTO-MCNC: NEGATIVE MG/DL
LEUKOCYTE ESTERASE UR QL STRIP.AUTO: NEGATIVE
LYMPHOCYTES # BLD: 0.9 K/UL (ref 1–5.1)
LYMPHOCYTES NFR BLD: 10.8 %
MAGNESIUM SERPL-MCNC: 1.7 MG/DL (ref 1.8–2.4)
MCH RBC QN AUTO: 26.9 PG (ref 26–34)
MCHC RBC AUTO-ENTMCNC: 31.8 G/DL (ref 31–36)
MCV RBC AUTO: 84.7 FL (ref 80–100)
MONOCYTES # BLD: 0.7 K/UL (ref 0–1.3)
MONOCYTES NFR BLD: 7.4 %
NEUTROPHILS # BLD: 6.5 K/UL (ref 1.7–7.7)
NEUTROPHILS NFR BLD: 73.5 %
NITRITE UR QL STRIP.AUTO: NEGATIVE
PH UR STRIP.AUTO: 6 [PH] (ref 5–8)
PLATELET # BLD AUTO: 175 K/UL (ref 135–450)
PMV BLD AUTO: 7.5 FL (ref 5–10.5)
POTASSIUM SERPL-SCNC: 4.2 MMOL/L (ref 3.5–5.1)
PROT UR STRIP.AUTO-MCNC: 30 MG/DL
RBC # BLD AUTO: 4.37 M/UL (ref 4.2–5.9)
RBC #/AREA URNS HPF: ABNORMAL /HPF (ref 0–4)
SODIUM SERPL-SCNC: 139 MMOL/L (ref 136–145)
SP GR UR STRIP.AUTO: 1.02 (ref 1–1.03)
UA COMPLETE W REFLEX CULTURE PNL UR: ABNORMAL
UA DIPSTICK W REFLEX MICRO PNL UR: YES
URN SPEC COLLECT METH UR: ABNORMAL
UROBILINOGEN UR STRIP-ACNC: 0.2 E.U./DL
WBC # BLD AUTO: 8.8 K/UL (ref 4–11)
WBC #/AREA URNS HPF: ABNORMAL /HPF (ref 0–5)

## 2024-01-30 PROCEDURE — 80048 BASIC METABOLIC PNL TOTAL CA: CPT

## 2024-01-30 PROCEDURE — 81001 URINALYSIS AUTO W/SCOPE: CPT

## 2024-01-30 PROCEDURE — 6370000000 HC RX 637 (ALT 250 FOR IP)

## 2024-01-30 PROCEDURE — 94640 AIRWAY INHALATION TREATMENT: CPT

## 2024-01-30 PROCEDURE — 2580000003 HC RX 258

## 2024-01-30 PROCEDURE — 36415 COLL VENOUS BLD VENIPUNCTURE: CPT

## 2024-01-30 PROCEDURE — 6360000002 HC RX W HCPCS

## 2024-01-30 PROCEDURE — 6360000002 HC RX W HCPCS: Performed by: HOSPITALIST

## 2024-01-30 PROCEDURE — 2500000003 HC RX 250 WO HCPCS

## 2024-01-30 PROCEDURE — 99231 SBSQ HOSP IP/OBS SF/LOW 25: CPT | Performed by: INTERNAL MEDICINE

## 2024-01-30 PROCEDURE — 83735 ASSAY OF MAGNESIUM: CPT

## 2024-01-30 PROCEDURE — 85025 COMPLETE CBC W/AUTO DIFF WBC: CPT

## 2024-01-30 PROCEDURE — 94761 N-INVAS EAR/PLS OXIMETRY MLT: CPT

## 2024-01-30 PROCEDURE — 99239 HOSP IP/OBS DSCHRG MGMT >30: CPT | Performed by: HOSPITALIST

## 2024-01-30 PROCEDURE — 6370000000 HC RX 637 (ALT 250 FOR IP): Performed by: HOSPITALIST

## 2024-01-30 PROCEDURE — 2700000000 HC OXYGEN THERAPY PER DAY

## 2024-01-30 RX ORDER — LANOLIN ALCOHOL/MO/W.PET/CERES
400 CREAM (GRAM) TOPICAL ONCE
Status: COMPLETED | OUTPATIENT
Start: 2024-01-30 | End: 2024-01-30

## 2024-01-30 RX ORDER — PREDNISONE 20 MG/1
40 TABLET ORAL DAILY
Status: DISCONTINUED | OUTPATIENT
Start: 2024-01-30 | End: 2024-01-30 | Stop reason: HOSPADM

## 2024-01-30 RX ORDER — PREDNISONE 20 MG/1
40 TABLET ORAL DAILY
Qty: 8 TABLET | Refills: 0 | Status: SHIPPED | OUTPATIENT
Start: 2024-01-31 | End: 2024-02-04

## 2024-01-30 RX ORDER — DOXYCYCLINE 100 MG/1
100 TABLET ORAL 2 TIMES DAILY
Qty: 9 TABLET | Refills: 0 | Status: SHIPPED | OUTPATIENT
Start: 2024-01-30 | End: 2024-02-04

## 2024-01-30 RX ADMIN — SODIUM CHLORIDE, PRESERVATIVE FREE 10 ML: 5 INJECTION INTRAVENOUS at 10:24

## 2024-01-30 RX ADMIN — PREDNISONE 40 MG: 20 TABLET ORAL at 10:23

## 2024-01-30 RX ADMIN — Medication 400 MG: at 10:23

## 2024-01-30 RX ADMIN — POLYETHYLENE GLYCOL 3350 17 G: 17 POWDER, FOR SOLUTION ORAL at 10:23

## 2024-01-30 RX ADMIN — FUROSEMIDE 40 MG: 40 TABLET ORAL at 10:23

## 2024-01-30 RX ADMIN — DOXYCYCLINE 100 MG: 100 INJECTION, POWDER, LYOPHILIZED, FOR SOLUTION INTRAVENOUS at 02:25

## 2024-01-30 RX ADMIN — SODIUM CHLORIDE 30 MG/ML INHALATION SOLUTION 4 ML: 30 SOLUTION INHALANT at 09:44

## 2024-01-30 RX ADMIN — LEVOTHYROXINE SODIUM 112 MCG: 0.11 TABLET ORAL at 06:24

## 2024-01-30 RX ADMIN — ALBUTEROL SULFATE 2.5 MG: 2.5 SOLUTION RESPIRATORY (INHALATION) at 09:44

## 2024-01-30 RX ADMIN — PANTOPRAZOLE SODIUM 40 MG: 40 TABLET, DELAYED RELEASE ORAL at 06:24

## 2024-01-30 RX ADMIN — BENZOCAINE 6 MG-MENTHOL 10 MG LOZENGES 1 LOZENGE: at 07:45

## 2024-01-30 RX ADMIN — AZTREONAM 2000 MG: 2 INJECTION, POWDER, LYOPHILIZED, FOR SOLUTION INTRAMUSCULAR; INTRAVENOUS at 10:26

## 2024-01-30 NOTE — PROGRESS NOTES
Patient discharged to home.  Caregiver here to drive him home.  IV removed.  Heart monitor removed.  Medications picked up from down stairs.

## 2024-01-30 NOTE — PROGRESS NOTES
Pulmonary Followup Note    CC: CAP on ILD   Subjective:  Feels at his baseline this AM. Denies shortness of breath or chest pain.     ROS:  Denies headache, nausea or chest pain.    24HR INTAKE/OUTPUT:    Intake/Output Summary (Last 24 hours) at 2024 1124  Last data filed at 2024 2149  Gross per 24 hour   Intake 240 ml   Output 200 ml   Net 40 ml        predniSONE  40 mg Oral Daily    rivaroxaban  15 mg Oral Daily    polyethylene glycol  17 g Oral Daily    albuterol  2.5 mg Nebulization BID RT    sodium chloride flush  5-40 mL IntraVENous 2 times per day    furosemide  40 mg Oral Daily    latanoprost  1 drop Both Eyes Nightly    levothyroxine  112 mcg Oral QAM AC    mirtazapine  15 mg Oral Nightly    pantoprazole  40 mg Oral Daily    atorvastatin  10 mg Oral Daily    sodium chloride (Inhalant)  4 mL Nebulization BID    temazepam  15 mg Oral Nightly    aztreonam  2,000 mg IntraVENous Q12H           PHYSICAL EXAMINATION:  /79   Pulse 61   Temp 97.6 °F (36.4 °C) (Oral)   Resp 18   Ht 1.85 m (6' 0.84\")   Wt 88.5 kg (195 lb)   SpO2 97%   BMI 25.84 kg/m²   CURRENT PULSE OXIMETRY:  SpO2: 97 %  24HR PULSE OXIMETRY RANGE:  SpO2  Av.3 %  Min: 97 %  Max: 100 %       Gen: No respiratory distress. Speaking in full sentences without the use of  accessory muscle.   HEENT: PERRL, EOMI, OP nl  Lung:  Fine inspiratory crackles.   CV: RRR without M/R/R  Abd: +BS, soft, NT/ND  Ext: No edema.    DATA  CBC:   Recent Labs     24  2203 24  0758 24  0745   WBC 21.6* 13.1* 8.8   HGB 12.9* 11.8* 11.8*   HCT 40.0* 36.7* 37.0*   MCV 83.9 83.5 84.7    164 175     BMP:   Recent Labs     24  0621 24  0758 24  0745    136 139   K 4.4 3.9 4.2    104 106   CO2 22 24 23   BUN 33* 43* 46*   CREATININE 2.4* 3.1* 2.8*     No results for input(s): \"PHART\", \"ZTN1AFM\", \"PO2ART\" in the last 72 hours.  LIVER PROFILE: No results for  worsening shortness of breath and increased cough.  Patient has no history of IPF/UIP and is currently enrolled in hospice.  CT imaging revealed new right lower lobe consolidation,lab work positive for leukocytosis    INTERVAL HISTORY:  Doing well, no issues reported, states he feels like his baseline and wants to go home       Intake/Output Summary (Last 24 hours) at 2024 1647  Last data filed at 2024 2149  Gross per 24 hour   Intake 120 ml   Output 200 ml   Net -80 ml    SpO2: 97 %  SpO2  Av.3 %  Min: 97 %  Max: 100 %   - O2 Flow Rate (L/min): 3 L/min     ASSESSMENT:  ILD -IPF/UIP  Chronic hypoxic respiratory failure  Right lower lobe pneumonia  Chronic deconditioning    PLAN:  Provide O2 supplementation to keep SpO2 between 88-92% if needed.    Abx under primary guidance  PRN bronchodilators  Prednisone for a total of 5 days  Out of bed and up to chair as tolerated      OK to dc from the pulmonary perspective    Keith Eckert \"Jose Alfredo\" Ruddy Tamayo MD  Pulmonary and Critical Care Medicine

## 2024-01-30 NOTE — PLAN OF CARE
Problem: Discharge Planning  Goal: Discharge to home or other facility with appropriate resources  Outcome: Progressing  Flowsheets (Taken 1/29/2024 2019)  Discharge to home or other facility with appropriate resources:   Identify barriers to discharge with patient and caregiver   Identify discharge learning needs (meds, wound care, etc)     Problem: Safety - Adult  Goal: Free from fall injury  Outcome: Progressing  Flowsheets (Taken 1/29/2024 2019)  Free From Fall Injury: Instruct family/caregiver on patient safety

## 2024-01-30 NOTE — CARE COORDINATION
Case Management Assessment            Discharge Note                    Date / Time of Note: 1/30/2024 2:54 PM                  Discharge Note Completed by: Phyllis Kent RN    Patient Name: Juan Vigil   YOB: 1931  Diagnosis: Pulmonary fibrosis (HCC) [J84.10]  Debility [R53.81]  Sepsis due to pneumonia (HCC) [J18.9, A41.9]  Pneumonia of right lower lobe due to infectious organism [J18.9]   Date / Time: 1/27/2024  9:18 PM    Current PCP: Chinedu Moreau MD  Clinic patient: No    Hospitalization in the last 30 days: No       Advance Directives:  Code Status: Limited  [place] DNR form completed and on chart: Yes    Financial:  Payor: [facility] MEDICARE / Plan: [employer] ESSENTIAL/PLUS / Product Type: *No Product type* /      Ph[facility]acy:    [id number] [place], [place] - [address] - P [phone] - F [phone]  [address] [facility] [address]  Phone: [phone] Fax: [phone]    [facility] [address] - P [phone] - F [phone]  [address] [facility] [address]  Phone: [phone] Fax: [phone]      Assistance purchasing medications?: Potential Assistance Purchasing Medications: No  Assistance provided by Case Management: None at this time    Does patient want to participate in local refill/ meds to beds program?:      Meds To Beds General Rules:  1. Can ONLY be done Monday- Friday between 8:30am-5pm  2. Prescription(s) must be in pharmacy by 3pm to be filled same day  3.Copy of patient's insurance/ prescription drug card and patient face sheet must be sent along with the prescription(s)  4. Cost of Rx cannot be added to hospital bill. If financial assistance is needed, please contact unit  or ;  or  CANNOT provide pharmacy voucher for patients co-pays  5. Patients can then  the prescription on their way out of the hospital at discharge, or

## 2024-01-30 NOTE — CARE COORDINATION
CM following for dc planning:      D/C order noted:   Discharge pending results of UA.     Patient  was  active  with [facility]  service  PTA  but  CM  spoke with [facility]  who confirmed  that the  pt wa  active  but  cancelled  their services  as of  1/27/2024.      Cm  inquired if they needed  a new  order to re enroll  or  if the [patient  and  family  were  able to  reach  out .     CM  called  Liaison Paty ALMAGUER  liaison  at [facility]  [phone].    CM  left    for  her  requesting  :  a call back  .     CM  will assist  with  disposition  planning .      Electronically signed by Phyllis Kent RN on 1/30/2024 at 11:15 AM       Phyllis Kent RN Case Manager  [facility]  [address]  [facility] [id number] [phone]  Fax [phone]

## 2024-01-31 ENCOUNTER — TELEPHONE (OUTPATIENT)
Dept: INTERNAL MEDICINE CLINIC | Age: 89
End: 2024-01-31

## 2024-01-31 ENCOUNTER — CARE COORDINATION (OUTPATIENT)
Dept: CASE MANAGEMENT | Age: 89
End: 2024-01-31

## 2024-01-31 NOTE — TELEPHONE ENCOUNTER
Rosette would like to talk with Maryuri before her fathers appt 02/09/24.    Her father is very confused, angry about things and she would like to discuss this prior to the appt.

## 2024-01-31 NOTE — TELEPHONE ENCOUNTER
Patient was diagnosed with a UTI on Monday.    Daughter is asking what medications were prescribed for this, when he started it, and how long was he to be taking it etc.    She is not sure what was prescribed.    Please call her to discuss.

## 2024-01-31 NOTE — TELEPHONE ENCOUNTER
Confusion getting worse.    Concern for money management.    Patient continues to drive this is a concern.    Daughter request to speak with physician.    Virtual visit can be scheduled.    Daughter notified.    VV will be scheduled on 02/5/2024 at 1:00pm.

## 2024-01-31 NOTE — TELEPHONE ENCOUNTER
Was d/c from hospital and wanted to know does he need to do anymore blood work prior to appt on 2/9/24?

## 2024-02-01 LAB
BACTERIA BLD CULT ORG #2: NORMAL
BACTERIA BLD CULT: NORMAL

## 2024-02-02 NOTE — PROGRESS NOTES
Physician Progress Note      PATIENT:               EVI GOMEZ  CSN #:                  [id number]  :                       1931  ADMIT DATE:       2024 9:18 PM  DISCH DATE:        2024 5:08 PM  RESPONDING  PROVIDER #:        Wil Oden MD          QUERY TEXT:    Pt admitted with Pneumonia.  If possible, please document in the progress   notes and discharge summary if you are evaluating and/or treating any of the   following:      Note: CAP and HCAP indicate where the pneumonia was acquired, not a specific   type.    The medical record reflects the following:  Risk Factors: 89 y/o male with pneumonia and hx of ILD, Pulmonology fibrosis  Clinical Indicators: CXR showed BL interstitial opacities Pulmonology consult:   Consolidation on the right lower lobe of the lung indicating a PNA vs mass   since last CT Scan was about a year ago as showed more extensive disease on   the right side compared to the left. He is currently on Doxycycline and   Aztreonam for treatment of CAP as he is allergic to a lot of antibiotics   including Penicillins with anaphylaxis listed as the reaction, The infectious   workup thus far has been unremarkable.\"  Treatment: Doxycycline and Aztreonam, Pulmonology consul  Options provided:  -- Gram negative pneumonia  -- Aspiration pneumonia  -- Other - I will add my own diagnosis  -- Disagree - Not applicable / Not valid  -- Disagree - Clinically unable to determine / Unknown  -- Refer to Clinical Documentation Reviewer    PROVIDER RESPONSE TEXT:    This patient has gram negative pneumonia.    Query created by: Ashlie Quiñones on 2024 12:37 PM      Electronically signed by:  Wil Oden MD 2024 2:53 PM

## 2024-02-05 ENCOUNTER — TELEMEDICINE (OUTPATIENT)
Dept: INTERNAL MEDICINE CLINIC | Age: 89
End: 2024-02-05
Payer: MEDICARE

## 2024-02-05 DIAGNOSIS — J84.10 PULMONARY FIBROSIS (HCC): Primary | ICD-10-CM

## 2024-02-05 PROCEDURE — 1123F ACP DISCUSS/DSCN MKR DOCD: CPT | Performed by: INTERNAL MEDICINE

## 2024-02-05 PROCEDURE — 99213 OFFICE O/P EST LOW 20 MIN: CPT | Performed by: INTERNAL MEDICINE

## 2024-02-05 NOTE — PROGRESS NOTES
2024    TELEHEALTH EVALUATION -- Audio/Visual    HPI: Follow-up pulmonary fibrosis    Juan Vigil (:  1931) has requested an audio/video evaluation for the following concern(s):    Conference with daughter concerning father's pulmonary fibrosis and progression he was recently admitted to [facility] with pneumonitis associated with pulmonary fibrosis prior to that he was on hospice he was taken out of hospice and now desires to reenroll in addition he is having more to fatigue and mental forgetfulness and she is concerned about his driving which he is doing    Review of Systems no fever chills shortness of breath    Prior to Visit Medications    Medication Sig Taking? Authorizing Provider   rivaroxaban (XARELTO) 15 MG TABS tablet Take 1 tablet by mouth daily (with breakfast)  Víctor Corrales DO   omeprazole (PRILOSEC) 20 MG delayed release capsule TAKE 1 CAPSULE BY MOUTH EVERY EVENING  Thony Luu MD   temazepam (RESTORIL) 15 MG capsule Take 1 capsule by mouth at bedtime for 90 days. Max Daily Amount: 15 mg  Chinedu Moreau MD   vitamin D (CHOLECALCIFEROL) 25 MCG (1000 UT) TABS tablet Take 1 tablet by mouth daily  Jon Williamson MD   furosemide (LASIX) 40 MG tablet TAKE 1 TABLET BY MOUTH DAILY  Chinedu Moreau MD   mirtazapine (REMERON) 15 MG tablet TAKE ONE TABLET BY MOUTH AT BEDTIME  Chinedu Moreau MD   levothyroxine (SYNTHROID) 112 MCG tablet Take 1 tablet by mouth every morning (before breakfast)  Chinedu Moreau MD   ipratropium (ATROVENT) 0.03 % nasal spray 2 sprays by Nasal route 2 times daily  Thony Luu MD   sodium chloride, Inhalant, 3 % nebulizer solution Take 4 mLs by nebulization in the morning and at bedtime  Thony Luu MD   febuxostat (ULORIC) 80 MG TABS tablet Take 1 tablet by mouth as needed (gout)  Chinedu Moreau MD   VITAMIN K PO Take by mouth  ProviderIsabel MD   albuterol (ACCUNEB) 1.25 MG/3ML nebulizer solution Inhale 3 mLs into the lungs 3 times

## 2024-02-08 ENCOUNTER — TELEPHONE (OUTPATIENT)
Dept: INTERNAL MEDICINE CLINIC | Age: 89
End: 2024-02-08

## 2024-02-09 ENCOUNTER — OFFICE VISIT (OUTPATIENT)
Dept: INTERNAL MEDICINE CLINIC | Age: 89
End: 2024-02-09
Payer: MEDICARE

## 2024-02-09 ENCOUNTER — TELEPHONE (OUTPATIENT)
Dept: INTERNAL MEDICINE CLINIC | Age: 89
End: 2024-02-09

## 2024-02-09 VITALS
BODY MASS INDEX: 26.51 KG/M2 | HEART RATE: 67 BPM | TEMPERATURE: 97.7 F | WEIGHT: 200 LBS | SYSTOLIC BLOOD PRESSURE: 132 MMHG | DIASTOLIC BLOOD PRESSURE: 74 MMHG | OXYGEN SATURATION: 89 %

## 2024-02-09 DIAGNOSIS — J84.10 PULMONARY FIBROSIS (HCC): Primary | ICD-10-CM

## 2024-02-09 PROCEDURE — 99213 OFFICE O/P EST LOW 20 MIN: CPT | Performed by: INTERNAL MEDICINE

## 2024-02-09 PROCEDURE — 1123F ACP DISCUSS/DSCN MKR DOCD: CPT | Performed by: INTERNAL MEDICINE

## 2024-02-09 ASSESSMENT — PATIENT HEALTH QUESTIONNAIRE - PHQ9: DEPRESSION UNABLE TO ASSESS: URGENT/EMERGENT SITUATION

## 2024-02-09 NOTE — TELEPHONE ENCOUNTER
Nargis from [facility] states the pt was admitted to them yesterday. Needs to confirm the hospice diagnosis is correct for the pt with Dr. Moreau and ask if Dr. Moreau will follow the pt. Nargis states the have the diagnosis as pulmonary fibrosis. Can not be a verbal needs to be a discussion per Nargis.     Please call and advise [phone]

## 2024-02-09 NOTE — PROGRESS NOTES
CHIEF COMPLAINT: Juan Vigil is a 89 y.o. male who presents for : Follow-up pneumonia pulmonary fibrosis    HPI: Patient presented with follow-up to the hospital with sedation for pneumonia and pulmonary fibrosis he is going back to hospice but he is breathing is back to baseline he was treated with antibiotics and steroids she also had a urinary tract infection which is better    Review of Systems:   Constitutional:  Denies fever or chills   Eyes:  Denies change in visual acuity   HENT:  Denies nasal congestion or sore throat   Respiratory:  Denies cough or shortness of breath   Cardiovascular:  Denies chest pain or edema   GI:  Denies abdominal pain, nausea, vomiting, bloody stools or diarrhea   :  Denies dysuria   Musculoskeletal:  Denies back pain or joint pain   Integument:  Denies rash   Neurologic:  Denies headache, focal weakness or sensory changes   Endocrine:  Denies polyuria or polydipsia   Lymphatic:  Denies swollen glands   Psychiatric:  Denies depression or anxiety     Past Medical History:        Diagnosis Date    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     Atrial fibrillation (HCC)     on event monitor    BPH (benign prostatic hyperplasia)     Gout     HTN (hypertension)     Hyperlipidemia     Hypothyroidism     Kidney stones     Pacemaker 2006       Past Surgical History:        Procedure Laterality Date    SKIN CANCER EXCISION      SKIN CANCER EXCISION  2012    melanoma in situ [place]    SPINE SURGERY  2007    L4-5    TIBIA FRACTURE SURGERY         Family History:  Family History   Problem Relation Age of Onset    Other Sister         Rheumatic fever       Social History:  Social History     Socioeconomic History    Marital status:    Tobacco Use    Smoking status: Former     Current packs/day: 0.00     Average packs/day: 1 pack/day for 10.0 years (10.0 ttl pk-yrs)     Types: Cigarettes     Start date: 1953     Quit date: 1963     Years since quittin.1

## 2024-02-13 RX ORDER — RIVAROXABAN 20 MG/1
TABLET, FILM COATED ORAL
Qty: 90 TABLET | Refills: 1 | Status: SHIPPED | OUTPATIENT
Start: 2024-02-13

## 2024-02-13 RX ORDER — NEEDLES, FILTER 19GX1 1/2"
NEEDLE, DISPOSABLE MISCELLANEOUS
Qty: 6 EACH | Refills: 1 | Status: SHIPPED | OUTPATIENT
Start: 2024-02-13

## 2024-02-13 NOTE — TELEPHONE ENCOUNTER
Pt called back regarding the B-D syringes. He is completely out and really needs them refilled.     Advise pt at :  [phone]

## 2024-02-15 ENCOUNTER — TELEPHONE (OUTPATIENT)
Dept: INTERNAL MEDICINE CLINIC | Age: 89
End: 2024-02-15

## 2024-02-15 NOTE — TELEPHONE ENCOUNTER
Her dad started having stomach pains and diarrhea this morning.  His temperature is normal.  Can he take kaopectate? - Please advise.    They are asking for a letter for a handicap placard for two cars.    Please advise when the letter is ready and she will pick it up at the .

## 2024-02-16 DIAGNOSIS — E29.1 HYPOGONADISM IN MALE: Primary | ICD-10-CM

## 2024-02-16 RX ORDER — TESTOSTERONE CYPIONATE 200 MG/ML
INJECTION, SOLUTION INTRAMUSCULAR
Qty: 7 ML | Refills: 3 | Status: SHIPPED | OUTPATIENT
Start: 2024-02-16 | End: 2024-03-01

## 2024-02-16 RX ORDER — TESTOSTERONE ENANTHATE 200 MG/ML
100 INJECTION, SOLUTION INTRAMUSCULAR
Qty: 1 ML | Refills: 0 | Status: SHIPPED | OUTPATIENT
Start: 2024-02-16 | End: 2024-03-17

## 2024-02-16 NOTE — TELEPHONE ENCOUNTER
Pt is wanting to leave a message for Maryuri:     He is taking testosterone injections for many years.  He has requested them many times and all that he is told from [facility] [employer] is that syringes are ready.      90 Day Supply please    testosterone enanthate (DELATESTRYL) 200 MG/ML injection     Pt is out of the medication    [id number] [address] - P [phone] - F [phone]       Please advise pt at:  [phone]              Cell#                [phone]

## 2024-02-19 ENCOUNTER — TELEPHONE (OUTPATIENT)
Dept: INTERNAL MEDICINE CLINIC | Age: 89
End: 2024-02-19

## 2024-02-19 NOTE — TELEPHONE ENCOUNTER
/95     Medications being taken:    Amlodipine 5 mg prn   Lasix 40 mg qd    Vidhya Garvey calling from [facility]- Should medications be changed?     Aware Dr. Moreau out of the office this week.

## 2024-02-20 ENCOUNTER — TELEPHONE (OUTPATIENT)
Dept: PULMONOLOGY | Age: 89
End: 2024-02-20

## 2024-02-20 RX ORDER — ALBUTEROL SULFATE 1.25 MG/3ML
1 SOLUTION RESPIRATORY (INHALATION) 3 TIMES DAILY PRN
Qty: 4 EACH | Refills: 3 | Status: SHIPPED | OUTPATIENT
Start: 2024-02-20 | End: 2024-05-20

## 2024-02-20 NOTE — TELEPHONE ENCOUNTER
Pt needs a refill on albuterol inhaler. Pt says he's almost out and needs it.    Pharm [facility] - [place], [place] - [address] - P [phone] - F [phone]    Please call and advise

## 2024-02-20 NOTE — TELEPHONE ENCOUNTER
Patient states that he had a terrible choking episode last night.  He was choking so badly that he not able to even call 911.  Eventually episode resolved.  He thinks \"that fibers from my lungs got stuck in my epiglottis\"  (states he has pulmonary fibrosis) and wants to know if there is a drug that can help prevent this in the future.    He can be reached on his cell [phone]

## 2024-02-22 ENCOUNTER — TELEPHONE (OUTPATIENT)
Dept: INTERNAL MEDICINE CLINIC | Age: 89
End: 2024-02-22

## 2024-02-22 NOTE — TELEPHONE ENCOUNTER
Pts daughter is requesting an update on her fathers Handicap placard(2) .. states she hasn't heard from anybody in over a week. Please call and advise at [phone].

## 2024-02-22 NOTE — TELEPHONE ENCOUNTER
Letters were completed on 02/15/2024.    They are waiting at the  for .    Message left on Rosette's voicemail.

## 2024-02-27 NOTE — TELEPHONE ENCOUNTER
He already takes a lot of mucinex.  Unfortunately there isn't additional medications to keep someone from choking on their own phlegm.

## 2024-03-01 ENCOUNTER — TELEPHONE (OUTPATIENT)
Dept: PULMONOLOGY | Age: 89
End: 2024-03-01

## 2024-03-01 RX ORDER — DOXYCYCLINE HYCLATE 100 MG
100 TABLET ORAL 2 TIMES DAILY
Qty: 14 TABLET | Refills: 0 | Status: SHIPPED | OUTPATIENT
Start: 2024-03-01 | End: 2024-03-08

## 2024-03-01 NOTE — TELEPHONE ENCOUNTER
Pt is requesting to speak with Maryuri about his cough and yellow mucous he has had for 2 to 3 days now and he would like to discuss this with her.     Please call and advise [phone]

## 2024-03-01 NOTE — TELEPHONE ENCOUNTER
Ed called the office with complaints of coughing for 3 days. Choking. He also called Dr Bernal office. Just said he wants to take to Dr Luu.     Callback # [phone]

## 2024-03-04 RX ORDER — VIBEGRON 75 MG/1
75 TABLET, FILM COATED ORAL DAILY
Qty: 30 TABLET | Refills: 1 | Status: SHIPPED | OUTPATIENT
Start: 2024-03-04

## 2024-03-06 RX ORDER — PREDNISONE 20 MG/1
20 TABLET ORAL DAILY
Qty: 10 TABLET | Refills: 0 | Status: SHIPPED | OUTPATIENT
Start: 2024-03-06 | End: 2024-03-16

## 2024-03-06 NOTE — TELEPHONE ENCOUNTER
Pts daughter Gudelia states the pt has the same issues he had before he went to [facility] about a month ago and is now taking doxycycline medication and she is asking if Dr. Moreau thinks he should be taking prednisone like last time when this all happened and if so would he prescribe it for him? Gudelia also states the pt is on hospice and is getting morphine medication as well.     Please call and advise [phone]

## 2024-03-08 ENCOUNTER — TELEPHONE (OUTPATIENT)
Dept: INTERNAL MEDICINE CLINIC | Age: 89
End: 2024-03-08

## 2024-03-08 ENCOUNTER — TELEMEDICINE (OUTPATIENT)
Dept: INTERNAL MEDICINE CLINIC | Age: 89
End: 2024-03-08
Payer: MEDICARE

## 2024-03-08 DIAGNOSIS — R41.0 DELIRIUM: Primary | ICD-10-CM

## 2024-03-08 DIAGNOSIS — J84.10 PULMONARY FIBROSIS (HCC): ICD-10-CM

## 2024-03-08 PROCEDURE — 1123F ACP DISCUSS/DSCN MKR DOCD: CPT | Performed by: INTERNAL MEDICINE

## 2024-03-08 PROCEDURE — 99213 OFFICE O/P EST LOW 20 MIN: CPT | Performed by: INTERNAL MEDICINE

## 2024-03-08 NOTE — TELEPHONE ENCOUNTER
Pts daughter Rosette is requesting a call from Maryuri to discuss dad and setting up a virtual visit for her to speak with Dr. Moreau.    Please call and advise [phone]

## 2024-03-08 NOTE — PROGRESS NOTES
3/8/2024    TELEHEALTH EVALUATION -- Audio/Visual    HPI: Mental status changes    Juan Vigil (:  1931) has requested an audio/video evaluation for the following concern(s):    Noted to have mental status changes he is currently on hospice is getting morphine and having some problems with orientation and memory this has been documented by his daughter he also has been having a long history of problems with gambling and there is a concern of whether he will make poor decisions with regard to his gambling and loses money he has a power of  for financial decisions that is his son    Review of Systems    Prior to Visit Medications    Medication Sig Taking? Authorizing Provider   predniSONE (DELTASONE) 20 MG tablet Take 1 tablet by mouth daily for 10 days  Chinedu Moreau MD   GEMTESA 75 MG TABS tablet TAKE 1 TABLET BY MOUTH DAILY  Chinedu Moreau MD   doxycycline hyclate (VIBRA-TABS) 100 MG tablet Take 1 tablet by mouth 2 times daily for 7 days  Chinedu Moreau MD   albuterol (ACCUNEB) 1.25 MG/3ML nebulizer solution Inhale 3 mLs into the lungs 3 times daily as needed for Wheezing or Shortness of Breath 4 BOXES FOR A 30 DAY SUPPLY  Wil Oden MD   testosterone cypionate (DEPOTESTOTERONE CYPIONATE) 200 MG/ML injection INJECT 1ML INTO MUSCLE EVERY 14 DAYS  Chinedu Moreau MD   testosterone enanthate (DELATESTRYL) 200 MG/ML injection Inject 0.5 mLs into the muscle every 14 days for 30 days. Max Daily Amount: 100 mg  Chinedu Moreau MD   XARELTO 20 MG TABS tablet TAKE ONE TABLET BY MOUTH EVERY DAY (with breakfast)  Chinedu Moreau MD   B-D INTEGRA SYRINGE 22G X 1-1/2\" 3 ML MISC USE 1 SYRINGE EVERY 14 DAYS WITH TESTOSTERONE INJECTION AS DIRECTED  Chinedu Moreau MD   rivaroxaban (XARELTO) 15 MG TABS tablet Take 1 tablet by mouth daily (with breakfast)  Víctor Corrales DO   omeprazole (PRILOSEC) 20 MG delayed release capsule TAKE 1 CAPSULE BY MOUTH EVERY EVENING  Thony Luu MD   temazepam (RESTORIL) 15 MG

## 2024-03-12 ENCOUNTER — TELEPHONE (OUTPATIENT)
Dept: INTERNAL MEDICINE CLINIC | Age: 89
End: 2024-03-12

## 2024-03-12 NOTE — TELEPHONE ENCOUNTER
Kit from [employer]  says patients daughter was told that for a letter of encapacity she would need to reach out to the previously mentioned  office. No notes on what was discussed. They're requesting a callback to discuss more at [phone] ext [phone].

## 2024-03-13 NOTE — TELEPHONE ENCOUNTER
Called pts daughter to ask what exactly the form was and what needs to be stated and she was unsure herself. Said she spoke to the physician during the virtual visit and he mentioned reaching out to a  to give brother \"financial POA\" due to concerns of father gambling money away. Pts daughter will call back after she has more information and/or has spoken to . Please call with any further questions at [phone].

## 2024-03-20 ENCOUNTER — TELEPHONE (OUTPATIENT)
Dept: INTERNAL MEDICINE CLINIC | Age: 89
End: 2024-03-20

## 2024-03-20 RX ORDER — ESCITALOPRAM OXALATE 10 MG/1
10 TABLET ORAL DAILY
COMMUNITY

## 2024-03-20 NOTE — TELEPHONE ENCOUNTER
Pts daughter is calling in to request a call back from Maryuri. Will not give further details. Please call and advise at [phone]

## 2024-03-20 NOTE — TELEPHONE ENCOUNTER
Is he sleeping? If not can increase mirtazapine to 30mg. If he is sleeping can add  Lexapro 10mg daily.

## 2024-03-20 NOTE — TELEPHONE ENCOUNTER
Spoke to Елена - Hospice nurse.    Patient is sleeping.    Will order Lexapro 10 mg - Take one tablet by mouth daily.

## 2024-03-20 NOTE — TELEPHONE ENCOUNTER
Daughter wants to schedule a virtual visit to discuss her Dad's condition?    When could you do this?    Appointment has been scheduled.

## 2024-03-20 NOTE — TELEPHONE ENCOUNTER
Елена is calling in requesting an antidepressant.  The patient is expressing suicidal ideation, she thinks Lexapro may be an option.     Please advise Елена at:  [phone]

## 2024-03-22 ENCOUNTER — TELEMEDICINE (OUTPATIENT)
Dept: INTERNAL MEDICINE CLINIC | Age: 89
End: 2024-03-22
Payer: MEDICARE

## 2024-03-22 DIAGNOSIS — F32.A DEPRESSION, UNSPECIFIED DEPRESSION TYPE: ICD-10-CM

## 2024-03-22 DIAGNOSIS — J84.10 PULMONARY FIBROSIS (HCC): Primary | ICD-10-CM

## 2024-03-22 PROCEDURE — 99213 OFFICE O/P EST LOW 20 MIN: CPT | Performed by: INTERNAL MEDICINE

## 2024-03-22 PROCEDURE — 1123F ACP DISCUSS/DSCN MKR DOCD: CPT | Performed by: INTERNAL MEDICINE

## 2024-03-22 NOTE — PROGRESS NOTES
3/22/2024    TELEHEALTH EVALUATION -- Audio/Visual    HPI: Progressive failure to thrive    Juan Vigil (:  1931) has requested an audio/video evaluation for the following concern(s):    Progressive failure to thrive he is being followed by hospice he is daughter is very concerned that he is getting more demented it was reinforced that the goal of hospice is to make him comfortable    Review of Systems    Prior to Visit Medications    Medication Sig Taking? Authorizing Provider   escitalopram (LEXAPRO) 10 MG tablet Take 1 tablet by mouth daily  ProviderIsabel MD   GEMTESA 75 MG TABS tablet TAKE 1 TABLET BY MOUTH DAILY  Chinedu Moreau MD   albuterol (ACCUNEB) 1.25 MG/3ML nebulizer solution Inhale 3 mLs into the lungs 3 times daily as needed for Wheezing or Shortness of Breath 4 BOXES FOR A 30 DAY SUPPLY  Wil Oden MD   testosterone cypionate (DEPOTESTOTERONE CYPIONATE) 200 MG/ML injection INJECT 1ML INTO MUSCLE EVERY 14 DAYS  Chinedu Moreau MD   testosterone enanthate (DELATESTRYL) 200 MG/ML injection Inject 0.5 mLs into the muscle every 14 days for 30 days. Max Daily Amount: 100 mg  Chinedu Moreau MD   XARELTO 20 MG TABS tablet TAKE ONE TABLET BY MOUTH EVERY DAY (with breakfast)  Chinedu Moreau MD   B-D INTEGRA SYRINGE 22G X 1-1/2\" 3 ML MISC USE 1 SYRINGE EVERY 14 DAYS WITH TESTOSTERONE INJECTION AS DIRECTED  Chinedu Moreau MD   rivaroxaban (XARELTO) 15 MG TABS tablet Take 1 tablet by mouth daily (with breakfast)  Víctor Corrales DO   omeprazole (PRILOSEC) 20 MG delayed release capsule TAKE 1 CAPSULE BY MOUTH EVERY EVENING  Thony Luu MD   temazepam (RESTORIL) 15 MG capsule Take 1 capsule by mouth at bedtime for 90 days. Max Daily Amount: 15 mg  Chinedu Moreau MD   vitamin D (CHOLECALCIFEROL) 25 MCG (1000 UT) TABS tablet Take 1 tablet by mouth daily  Jon Williamson MD   furosemide (LASIX) 40 MG tablet TAKE 1 TABLET BY MOUTH DAILY  Chinedu Moreau MD   mirtazapine (REMERON) 15 MG tablet

## 2024-03-25 ENCOUNTER — TELEPHONE (OUTPATIENT)
Dept: INTERNAL MEDICINE CLINIC | Age: 89
End: 2024-03-25

## 2024-03-25 DIAGNOSIS — F51.01 PRIMARY INSOMNIA: ICD-10-CM

## 2024-03-25 RX ORDER — TEMAZEPAM 15 MG/1
15 CAPSULE ORAL NIGHTLY
Qty: 90 CAPSULE | Refills: 1 | Status: SHIPPED | OUTPATIENT
Start: 2024-03-25 | End: 2024-09-21

## 2024-03-25 NOTE — TELEPHONE ENCOUNTER
Pt needs a refilltemazepam (RESTORIL) 15 MG capsule [[id number]]    [facility] #[id number] - [place] [address] - P [phone] - F [phone]

## 2024-03-25 NOTE — TELEPHONE ENCOUNTER
Elisha from [facility] pharmacy states the medication below will not get filled due to the script just being filled on 1/22/24 for a 90 day supply by DR. Moreau @ [facility] pharmacy in [facility] and it was delivered to patients house and signed for.        Please call and advise Elisha [phone]            temazepam (RESTORIL) 15 MG capsule [[id number]]     [facility] #[id number] [place] [address] - P [phone] - F [phone]

## 2024-04-03 ENCOUNTER — TELEPHONE (OUTPATIENT)
Dept: INTERNAL MEDICINE CLINIC | Age: 89
End: 2024-04-03

## 2024-04-03 NOTE — TELEPHONE ENCOUNTER
Pt is requesting to speak with Dr. Moreau or Maryuri about his constipation issues he has been off and on for not to long he states.    Please call and advise [phone]

## 2024-04-04 NOTE — TELEPHONE ENCOUNTER
Call returned to patient.    Recommendation given:    Can use Miralax twice daily.    He wanted to know what to do if this does not help.    Can try using Senokot.

## 2024-04-12 ENCOUNTER — TELEPHONE (OUTPATIENT)
Dept: INTERNAL MEDICINE CLINIC | Age: 89
End: 2024-04-12

## 2024-04-12 NOTE — TELEPHONE ENCOUNTER
Pt wants physician opinion on him taking Inspire for sleep therapy. Please call and advisee at [phone]

## 2024-04-17 ENCOUNTER — TELEPHONE (OUTPATIENT)
Dept: INTERNAL MEDICINE CLINIC | Age: 89
End: 2024-04-17

## 2024-04-17 NOTE — TELEPHONE ENCOUNTER
Pt is requesting a sooner appt then 4/29 to discuss getting on antidepressant medication. Please call and advise at [phone].

## 2024-04-17 NOTE — TELEPHONE ENCOUNTER
Zoloft 25 mg - Take one tablet in the morning.    Call returned to patient and Rosette patient's daughter.    Patient has Lexapro at home and will start this medication, they did not realize they had this at home already.

## 2024-04-17 NOTE — TELEPHONE ENCOUNTER
Call returned to patient.    Spoke to Rosette and the patient.    They are going to stay with the current company and will call if they really decide they are not happy with his care.  
Pt is very upset with his homecare company that has been going to his house.  He states they are either late, do not show up, and would like to know who you would recommend to go to his house.      Please advise pt at:  [phone]   
no discharge, no irritation, no pain, no redness, and no visual changes.

## 2024-04-22 ENCOUNTER — TELEPHONE (OUTPATIENT)
Dept: INTERNAL MEDICINE CLINIC | Age: 89
End: 2024-04-22

## 2024-04-22 NOTE — TELEPHONE ENCOUNTER
Hortensia Carrero is calling stating that pts portable oxygen is broken and they need a PA to get a new one. Hortensia states that the office should know where to send this to as she did not. Hortensia wanted this expedited asap.     Please call and advise

## 2024-04-23 ENCOUNTER — TELEPHONE (OUTPATIENT)
Dept: INTERNAL MEDICINE CLINIC | Age: 89
End: 2024-04-23

## 2024-04-23 NOTE — TELEPHONE ENCOUNTER
Spoke with Rosette, the patient's portable oxygen equipment has stopped working .    Spoke with Chikis at Hospice and she is going to reach out to his care team and have someone to call us on Wednesday on how to proceed with ordering this for the patient.

## 2024-04-23 NOTE — TELEPHONE ENCOUNTER
Pt has an itch in his left eye that began a couple of minutes ago.   No redness, no watering, he was watching TV and it just happened.  At 5:00 the homecare agency will be out there.  He has not tried a cold rag, an antihystamine, he's not gone outside, he does not know why it is itching

## 2024-04-24 ENCOUNTER — TELEPHONE (OUTPATIENT)
Dept: INTERNAL MEDICINE CLINIC | Age: 89
End: 2024-04-24

## 2024-04-24 NOTE — TELEPHONE ENCOUNTER
Call returned to the patient.    Spoke to patient , explained that the medication is over the counter and that  said that it is ok to take.    Patient states that he may just stick with the Tylenol pm.  
Pt would like to discuss a sleep mediation called Rhiannon with Maryuri when she is free to talk to him.    Please call and advise [phone]  
Patient tolerated procedure well.

## 2024-04-25 ENCOUNTER — TELEPHONE (OUTPATIENT)
Dept: INTERNAL MEDICINE CLINIC | Age: 89
End: 2024-04-25

## 2024-04-25 NOTE — TELEPHONE ENCOUNTER
Patient would like to speak to Maryuri.    He needs new battery for his portable oxygen tank.  He really needs this badly.    Please call him back.

## 2024-04-26 ENCOUNTER — TELEPHONE (OUTPATIENT)
Dept: INTERNAL MEDICINE CLINIC | Age: 89
End: 2024-04-26

## 2024-04-26 NOTE — TELEPHONE ENCOUNTER
Pt would like to know if he can take a second pill for his blood pressure at the end of the day.  He states his BP goes up to  160/90 .  S/W Jhoan and she will check with Dr Moreau and call the patient back.       Please advise at:   [phone]

## 2024-04-29 ENCOUNTER — OFFICE VISIT (OUTPATIENT)
Dept: INTERNAL MEDICINE CLINIC | Age: 89
End: 2024-04-29
Payer: MEDICARE

## 2024-04-29 VITALS
DIASTOLIC BLOOD PRESSURE: 72 MMHG | WEIGHT: 185 LBS | HEART RATE: 59 BPM | SYSTOLIC BLOOD PRESSURE: 118 MMHG | OXYGEN SATURATION: 96 % | BODY MASS INDEX: 24.52 KG/M2

## 2024-04-29 DIAGNOSIS — J84.10 PULMONARY FIBROSIS (HCC): Primary | ICD-10-CM

## 2024-04-29 DIAGNOSIS — N18.4 CKD (CHRONIC KIDNEY DISEASE) STAGE 4, GFR 15-29 ML/MIN (HCC): ICD-10-CM

## 2024-04-29 PROCEDURE — 99213 OFFICE O/P EST LOW 20 MIN: CPT | Performed by: INTERNAL MEDICINE

## 2024-04-29 PROCEDURE — 1123F ACP DISCUSS/DSCN MKR DOCD: CPT | Performed by: INTERNAL MEDICINE

## 2024-04-29 NOTE — PROGRESS NOTES
XARELTO 20 MG TABS tablet TAKE ONE TABLET BY MOUTH EVERY DAY (with breakfast) 2/13/24   Chinedu Moreau MD   B-D INTEGRA SYRINGE 22G X 1-1/2\" 3 ML MISC USE 1 SYRINGE EVERY 14 DAYS WITH TESTOSTERONE INJECTION AS DIRECTED 2/13/24   Chinedu Moreau MD   rivaroxaban (XARELTO) 15 MG TABS tablet Take 1 tablet by mouth daily (with breakfast) 1/30/24   Víctor Corrales DO   omeprazole (PRILOSEC) 20 MG delayed release capsule TAKE 1 CAPSULE BY MOUTH EVERY EVENING 1/26/24   Thony Luu MD   vitamin D (CHOLECALCIFEROL) 25 MCG (1000 UT) TABS tablet Take 1 tablet by mouth daily 1/22/24   Jon Williamson MD   furosemide (LASIX) 40 MG tablet TAKE 1 TABLET BY MOUTH DAILY 1/15/24   Chinedu Moreau MD   mirtazapine (REMERON) 15 MG tablet TAKE ONE TABLET BY MOUTH AT BEDTIME 12/12/23   Chinedu Moreau MD   levothyroxine (SYNTHROID) 112 MCG tablet Take 1 tablet by mouth every morning (before breakfast) 10/16/23   Chinedu Moreau MD   ipratropium (ATROVENT) 0.03 % nasal spray 2 sprays by Nasal route 2 times daily 9/27/23   Thony Luu MD   sodium chloride, Inhalant, 3 % nebulizer solution Take 4 mLs by nebulization in the morning and at bedtime 9/27/23   Thony Luu MD   febuxostat (ULORIC) 80 MG TABS tablet Take 1 tablet by mouth as needed (gout) 9/22/23   Chinedu Moreau MD   VITAMIN K PO Take by mouth    Isabel Rodgers MD   coenzyme Q10 100 MG CAPS capsule Take 1 capsule by mouth daily    Isabel Rodgers MD   latanoprost (XALATAN) 0.005 % ophthalmic solution Place 1 drop into both eyes nightly    Isabel Rodgers MD   turmeric 500 MG CAPS Take 1 capsule by mouth daily    Isabel Rodgers MD   vitamin B-6 (PYRIDOXINE) 100 MG tablet Take 1 tablet by mouth daily    Isabel Rodgers MD   amLODIPine (NORVASC) 5 MG tablet Take 1 tablet by mouth daily as needed (BP > 140/90 at 10AM check)    Isabel Rodgers MD   calcium-vitamin D (OSCAL-500) 500-200 MG-UNIT per tablet Take 1 tablet by mouth

## 2024-05-02 ENCOUNTER — OFFICE VISIT (OUTPATIENT)
Dept: PULMONOLOGY | Age: 89
End: 2024-05-02
Payer: MEDICARE

## 2024-05-02 VITALS
DIASTOLIC BLOOD PRESSURE: 60 MMHG | BODY MASS INDEX: 24.52 KG/M2 | HEART RATE: 65 BPM | SYSTOLIC BLOOD PRESSURE: 114 MMHG | WEIGHT: 185 LBS | RESPIRATION RATE: 16 BRPM | HEIGHT: 73 IN | OXYGEN SATURATION: 87 %

## 2024-05-02 DIAGNOSIS — J96.11 CHRONIC RESPIRATORY FAILURE WITH HYPOXIA (HCC): ICD-10-CM

## 2024-05-02 DIAGNOSIS — J84.112 IPF (IDIOPATHIC PULMONARY FIBROSIS) (HCC): Primary | ICD-10-CM

## 2024-05-02 PROCEDURE — 1123F ACP DISCUSS/DSCN MKR DOCD: CPT | Performed by: INTERNAL MEDICINE

## 2024-05-02 PROCEDURE — 99214 OFFICE O/P EST MOD 30 MIN: CPT | Performed by: INTERNAL MEDICINE

## 2024-05-02 RX ORDER — DIPHENHYDRAMINE HCL 25 MG
50 TABLET ORAL EVERY EVENING
Qty: 60 TABLET | Refills: 0 | Status: SHIPPED | OUTPATIENT
Start: 2024-05-02 | End: 2024-06-01

## 2024-05-02 NOTE — PROGRESS NOTES
Atrial fibrillation (HCC)     on event monitor    BPH (benign prostatic hyperplasia)     Gout     HTN (hypertension)     Hyperlipidemia     Hypothyroidism     Kidney stones     Pacemaker 2006       Social History:    Social History     Tobacco Use   Smoking Status Former    Current packs/day: 0.00    Average packs/day: 1 pack/day for 10.0 years (10.0 ttl pk-yrs)    Types: Cigarettes    Start date: 1953    Quit date: 1963    Years since quittin.3   Smokeless Tobacco Never       Family History:  Family History   Problem Relation Age of Onset    Other Sister         Rheumatic fever     Current Medications:  Current Outpatient Medications on File Prior to Visit   Medication Sig Dispense Refill    temazepam (RESTORIL) 15 MG capsule Take 1 capsule by mouth at bedtime for 180 days. Max Daily Amount: 15 mg 90 capsule 1    escitalopram (LEXAPRO) 10 MG tablet Take 1 tablet by mouth daily      GEMTESA 75 MG TABS tablet TAKE 1 TABLET BY MOUTH DAILY 30 tablet 1    albuterol (ACCUNEB) 1.25 MG/3ML nebulizer solution Inhale 3 mLs into the lungs 3 times daily as needed for Wheezing or Shortness of Breath 4 BOXES FOR A 30 DAY SUPPLY 4 each 3    XARELTO 20 MG TABS tablet TAKE ONE TABLET BY MOUTH EVERY DAY (with breakfast) 90 tablet 1    B-D INTEGRA SYRINGE 22G X 1-/2\" 3 ML MISC USE 1 SYRINGE EVERY 14 DAYS WITH TESTOSTERONE INJECTION AS DIRECTED 6 each 1    rivaroxaban (XARELTO) 15 MG TABS tablet Take 1 tablet by mouth daily (with breakfast) 30 tablet 1    omeprazole (PRILOSEC) 20 MG delayed release capsule TAKE 1 CAPSULE BY MOUTH EVERY EVENING 90 capsule 1    vitamin D (CHOLECALCIFEROL) 25 MCG (1000 UT) TABS tablet Take 1 tablet by mouth daily 30 tablet 3    furosemide (LASIX) 40 MG tablet TAKE 1 TABLET BY MOUTH DAILY 90 tablet 1    mirtazapine (REMERON) 15 MG tablet TAKE ONE TABLET BY MOUTH AT BEDTIME 90 tablet 0    levothyroxine (SYNTHROID) 112 MCG tablet Take 1 tablet by mouth every morning (before breakfast) 90

## 2024-05-14 ENCOUNTER — TELEPHONE (OUTPATIENT)
Dept: INTERNAL MEDICINE CLINIC | Age: 89
End: 2024-05-14

## 2024-05-14 NOTE — TELEPHONE ENCOUNTER
Pt called in earlier to speak to Maryuri about not being able to urinate.  Pt wants to know can he take the Tamsulosin in the am.  The directions state he is to take it in the pm but he has not been able to urinate this morning.,          Please advise pt at:  [phone]

## 2024-05-14 NOTE — TELEPHONE ENCOUNTER
Pt called in to speak with Maryuri. Will not disclose further information. Please call and advise at [phone].

## 2024-05-22 ENCOUNTER — TELEPHONE (OUTPATIENT)
Dept: INTERNAL MEDICINE CLINIC | Age: 89
End: 2024-05-22

## 2024-05-28 RX ORDER — METHYLPREDNISOLONE 4 MG/1
TABLET ORAL
Qty: 1 KIT | Refills: 0 | Status: SHIPPED | OUTPATIENT
Start: 2024-05-28 | End: 2024-06-03

## 2024-05-28 NOTE — TELEPHONE ENCOUNTER
Pt is calling regarding the gout still being in his R big toe.   What can be called in for him at this time?      Please advise pt at: [phone]

## 2024-05-30 RX ORDER — DEXTROMETHORPHAN HYDROBROMIDE AND PROMETHAZINE HYDROCHLORIDE 15; 6.25 MG/5ML; MG/5ML
5 SYRUP ORAL 4 TIMES DAILY PRN
Qty: 140 ML | Refills: 0 | Status: SHIPPED | OUTPATIENT
Start: 2024-05-30 | End: 2024-06-06

## 2024-05-30 NOTE — TELEPHONE ENCOUNTER
Pt states he has a runny nose and cough started yesterday.    Requesting to speak with Maryuri     [phone]

## 2024-05-31 RX ORDER — BENZONATATE 100 MG/1
100-200 CAPSULE ORAL 3 TIMES DAILY PRN
Qty: 30 CAPSULE | Refills: 0 | Status: SHIPPED | OUTPATIENT
Start: 2024-05-31 | End: 2024-06-10

## 2024-05-31 NOTE — TELEPHONE ENCOUNTER
Pt states the cough medication he is taking isnt working and would like to know if there is anything else he can take. Please call and advise at [phone].    [facility] #[id number] [place], [place] - [address] - P [phone] - F [phone]

## 2024-06-06 ENCOUNTER — TELEPHONE (OUTPATIENT)
Dept: INTERNAL MEDICINE CLINIC | Age: 89
End: 2024-06-06

## 2024-06-06 NOTE — TELEPHONE ENCOUNTER
Pt requested to speak with Maryuri. Will not disclose further information. Please call and advise at [phone].

## 2024-06-07 ENCOUNTER — TELEPHONE (OUTPATIENT)
Dept: INTERNAL MEDICINE CLINIC | Age: 89
End: 2024-06-07

## 2024-06-07 NOTE — TELEPHONE ENCOUNTER
Shree [place] states he needs a PA for the GEMTESA 75 MG TABS tablet [[id number]].     [facility] #[id number] - [place], [place] - [address] - P [phone] - F [phone]  [address], [facility] [address]  Phone: [phone]  Fax: [phone]

## 2024-06-07 NOTE — TELEPHONE ENCOUNTER
Patient states he is taking Advil for his pain (gout).    He says his gout pain is a nagging pain that he has often.    Will check with physician for recommendation.

## 2024-06-10 DIAGNOSIS — E29.1 HYPOGONADISM IN MALE: ICD-10-CM

## 2024-06-10 RX ORDER — VIBEGRON 75 MG/1
75 TABLET, FILM COATED ORAL DAILY
Qty: 30 TABLET | Refills: 0 | Status: SHIPPED | OUTPATIENT
Start: 2024-06-10

## 2024-06-10 NOTE — TELEPHONE ENCOUNTER
Pt called in and requested the following medication be refilled to the pharmacy below.      testosterone enanthate (DELATESTRYL) 200 MG/ML injection [[id number]]  [facility] #[id number] - [place], [place] - [address] - P [phone] - F [phone] [address], [facility] [address]  Phone: [phone]  Fax: [phone]

## 2024-06-13 RX ORDER — TESTOSTERONE CYPIONATE 200 MG/ML
INJECTION, SOLUTION INTRAMUSCULAR
Qty: 7 ML | Refills: 3 | OUTPATIENT
Start: 2024-06-13

## 2024-06-13 NOTE — TELEPHONE ENCOUNTER
Patient has been notified that  is unable to order this medication it is not good for older patients (cardiac).

## 2024-06-21 NOTE — TELEPHONE ENCOUNTER
Pts daughter is requesting a refill on medication below for the pt:      levothyroxine (SYNTHROID) 112 MCG tablet [[id number]     [facility] #[id number] [place] [address] - P [phone] - F [phone]

## 2024-06-24 ENCOUNTER — OFFICE VISIT (OUTPATIENT)
Dept: INTERNAL MEDICINE CLINIC | Age: 89
End: 2024-06-24
Payer: MEDICARE

## 2024-06-24 VITALS
WEIGHT: 180 LBS | HEART RATE: 75 BPM | BODY MASS INDEX: 23.86 KG/M2 | OXYGEN SATURATION: 91 % | HEIGHT: 73 IN | DIASTOLIC BLOOD PRESSURE: 80 MMHG | SYSTOLIC BLOOD PRESSURE: 124 MMHG

## 2024-06-24 DIAGNOSIS — I10 PRIMARY HYPERTENSION: ICD-10-CM

## 2024-06-24 DIAGNOSIS — N18.4 CKD (CHRONIC KIDNEY DISEASE) STAGE 4, GFR 15-29 ML/MIN (HCC): Primary | ICD-10-CM

## 2024-06-24 DIAGNOSIS — F33.42 RECURRENT MAJOR DEPRESSIVE DISORDER, IN FULL REMISSION (HCC): ICD-10-CM

## 2024-06-24 PROBLEM — J84.10 PULMONARY FIBROSIS (HCC): Status: RESOLVED | Noted: 2021-05-13 | Resolved: 2024-06-24

## 2024-06-24 PROBLEM — J18.9 PNEUMONIA OF RIGHT LOWER LOBE DUE TO INFECTIOUS ORGANISM: Status: RESOLVED | Noted: 2024-01-28 | Resolved: 2024-06-24

## 2024-06-24 PROCEDURE — 99213 OFFICE O/P EST LOW 20 MIN: CPT | Performed by: INTERNAL MEDICINE

## 2024-06-24 PROCEDURE — 1123F ACP DISCUSS/DSCN MKR DOCD: CPT | Performed by: INTERNAL MEDICINE

## 2024-06-24 RX ORDER — LEVOTHYROXINE SODIUM 112 UG/1
112 TABLET ORAL
Qty: 90 TABLET | Refills: 1 | Status: SHIPPED | OUTPATIENT
Start: 2024-06-24

## 2024-06-24 NOTE — PROGRESS NOTES
CHIEF COMPLAINT: Juan Vigil is a 89 y.o. male who presents for : follow-up of interstitial lung disease    HPI: Patient presented for follow-up of ILD with some progressive SOB. He has had difficulty with his breathing particularly with the summer heat. He has 8L home oxygen but says that he typically is on 6L of O2. He has orthopnea at night and must elevate the bed when sleeping. He denies cough, sputum production, recent fevers, and chest pain. His medication regime is well-controlled and managed by his two daughters who live nearby.    Review of Systems:   Constitutional:  Denies fever or chills   Eyes:  Denies change in visual acuity   HENT:  Denies nasal congestion or sore throat   Respiratory:  Denies cough or shortness of breath   Cardiovascular:  Denies chest pain or edema   GI:  Denies abdominal pain, nausea, vomiting, bloody stools or diarrhea   :  Denies dysuria   Musculoskeletal:  Denies back pain or joint pain   Integument:  Denies rash   Neurologic:  Denies headache, focal weakness or sensory changes   Endocrine:  Denies polyuria or polydipsia   Lymphatic:  Denies swollen glands   Psychiatric:  Denies depression or anxiety     Past Medical History:        Diagnosis Date    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     Atrial fibrillation (HCC) 2006    on event monitor    BPH (benign prostatic hyperplasia)     Gout     HTN (hypertension)     Hyperlipidemia     Hypothyroidism     Kidney stones     Pacemaker 2006       Past Surgical History:        Procedure Laterality Date    SKIN CANCER EXCISION      SKIN CANCER EXCISION  2012    melanoma in situ [facility]    SPINE SURGERY  2007    L4-5    TIBIA FRACTURE SURGERY         Family History:  Family History   Problem Relation Age of Onset    Other Sister         Rheumatic fever       Social History:  Social History     Socioeconomic History    Marital status:      Spouse name: None    Number of children: None    Years of education:

## 2024-06-25 ENCOUNTER — TELEPHONE (OUTPATIENT)
Dept: INTERNAL MEDICINE CLINIC | Age: 89
End: 2024-06-25

## 2024-06-25 NOTE — TELEPHONE ENCOUNTER
Pt wanted to let you know that he purchased a massager that he held on the joint of the big toe and the pain went right away.  He has been treating the gout with medications that were not helping.  He wants you to know about this and you can call if you would like to talk with him.     [phone]

## 2024-06-26 ENCOUNTER — TELEPHONE (OUTPATIENT)
Dept: INTERNAL MEDICINE CLINIC | Age: 89
End: 2024-06-26

## 2024-06-26 NOTE — TELEPHONE ENCOUNTER
Rosette wants to speak with an MA regarding sleeping medication for the pt. He is currently taking [employer] brand sleep medication and they want to know if it is safe to increase the dose he is taking.    Please call and advise: [phone]

## 2024-06-26 NOTE — TELEPHONE ENCOUNTER
Patient is taking temazepam right before bed, yoon asked them to give an hour prior and it seems to work fine for now     Will call if she needs anything else or if that doesn't continue to work

## 2024-06-28 ENCOUNTER — TELEPHONE (OUTPATIENT)
Dept: INTERNAL MEDICINE CLINIC | Age: 89
End: 2024-06-28

## 2024-06-28 NOTE — TELEPHONE ENCOUNTER
Vidhya - hospice RN called to discuss the pt's medication. Concern about his past suicidal thoughts.   Currently he is taking 62.5 mg Benadryl and 15mg Restoril.     Please call and advise [phone]

## 2024-07-01 ENCOUNTER — TELEPHONE (OUTPATIENT)
Dept: INTERNAL MEDICINE CLINIC | Age: 89
End: 2024-07-01

## 2024-07-08 DIAGNOSIS — F51.01 PRIMARY INSOMNIA: ICD-10-CM

## 2024-07-09 ENCOUNTER — TELEPHONE (OUTPATIENT)
Dept: INTERNAL MEDICINE CLINIC | Age: 89
End: 2024-07-09

## 2024-07-09 RX ORDER — AMLODIPINE BESYLATE 5 MG/1
5 TABLET ORAL DAILY
Qty: 90 TABLET | Refills: 0 | Status: SHIPPED | OUTPATIENT
Start: 2024-07-09

## 2024-07-09 RX ORDER — VIBEGRON 75 MG/1
75 TABLET, FILM COATED ORAL DAILY
Qty: 30 TABLET | Refills: 0 | Status: SHIPPED | OUTPATIENT
Start: 2024-07-09

## 2024-07-09 NOTE — TELEPHONE ENCOUNTER
Call returned to patient's daughter.     Appointment scheduled for next week the 17th, if something opens up please call.

## 2024-07-09 NOTE — TELEPHONE ENCOUNTER
Pt is having a lot of swelling, redness, and pain in his left foot.     Pt's daughter would like to be seen tomorrow or Thurs. Afternoon     Rosette would like a call from Maryuri    Please call and advise this afternoon

## 2024-07-10 RX ORDER — TEMAZEPAM 15 MG/1
CAPSULE ORAL
Qty: 30 CAPSULE | Refills: 0 | Status: SHIPPED | OUTPATIENT
Start: 2024-07-10 | End: 2024-08-09

## 2024-07-24 ENCOUNTER — TELEPHONE (OUTPATIENT)
Dept: INTERNAL MEDICINE CLINIC | Age: 89
End: 2024-07-24

## 2024-07-29 ENCOUNTER — OFFICE VISIT (OUTPATIENT)
Dept: INTERNAL MEDICINE CLINIC | Age: 89
End: 2024-07-29
Payer: MEDICARE

## 2024-07-29 VITALS
SYSTOLIC BLOOD PRESSURE: 140 MMHG | WEIGHT: 180 LBS | BODY MASS INDEX: 23.86 KG/M2 | HEIGHT: 73 IN | DIASTOLIC BLOOD PRESSURE: 78 MMHG | OXYGEN SATURATION: 93 %

## 2024-07-29 DIAGNOSIS — R53.83 FATIGUE, UNSPECIFIED TYPE: ICD-10-CM

## 2024-07-29 DIAGNOSIS — J84.10 PULMONARY FIBROSIS (HCC): ICD-10-CM

## 2024-07-29 DIAGNOSIS — N18.31 STAGE 3A CHRONIC KIDNEY DISEASE (HCC): ICD-10-CM

## 2024-07-29 DIAGNOSIS — E29.1 HYPOGONADISM IN MALE: ICD-10-CM

## 2024-07-29 DIAGNOSIS — E29.1 HYPOGONADISM IN MALE: Primary | ICD-10-CM

## 2024-07-29 LAB
ALBUMIN SERPL-MCNC: 4.3 G/DL (ref 3.4–5)
ALBUMIN/GLOB SERPL: 1.4 {RATIO} (ref 1.1–2.2)
ALP SERPL-CCNC: 67 U/L (ref 40–129)
ALT SERPL-CCNC: 11 U/L (ref 10–40)
ANION GAP SERPL CALCULATED.3IONS-SCNC: 11 MMOL/L (ref 3–16)
AST SERPL-CCNC: 17 U/L (ref 15–37)
BASOPHILS # BLD: 0.1 K/UL (ref 0–0.2)
BASOPHILS NFR BLD: 0.7 %
BILIRUB SERPL-MCNC: <0.2 MG/DL (ref 0–1)
BUN SERPL-MCNC: 39 MG/DL (ref 7–20)
CALCIUM SERPL-MCNC: 10 MG/DL (ref 8.3–10.6)
CHLORIDE SERPL-SCNC: 100 MMOL/L (ref 99–110)
CO2 SERPL-SCNC: 27 MMOL/L (ref 21–32)
CREAT SERPL-MCNC: 2.3 MG/DL (ref 0.8–1.3)
DEPRECATED RDW RBC AUTO: 16.2 % (ref 12.4–15.4)
EOSINOPHIL # BLD: 0.7 K/UL (ref 0–0.6)
EOSINOPHIL NFR BLD: 6.7 %
GFR SERPLBLD CREATININE-BSD FMLA CKD-EPI: 26 ML/MIN/{1.73_M2}
GLUCOSE SERPL-MCNC: 181 MG/DL (ref 70–99)
HCT VFR BLD AUTO: 37.9 % (ref 40.5–52.5)
HGB BLD-MCNC: 12.2 G/DL (ref 13.5–17.5)
LYMPHOCYTES # BLD: 1.6 K/UL (ref 1–5.1)
LYMPHOCYTES NFR BLD: 15.7 %
MCH RBC QN AUTO: 25.9 PG (ref 26–34)
MCHC RBC AUTO-ENTMCNC: 32.1 G/DL (ref 31–36)
MCV RBC AUTO: 80.5 FL (ref 80–100)
MONOCYTES # BLD: 0.5 K/UL (ref 0–1.3)
MONOCYTES NFR BLD: 4.5 %
NEUTROPHILS # BLD: 7.3 K/UL (ref 1.7–7.7)
NEUTROPHILS NFR BLD: 72.4 %
PLATELET # BLD AUTO: 206 K/UL (ref 135–450)
PMV BLD AUTO: 8.2 FL (ref 5–10.5)
POTASSIUM SERPL-SCNC: 4.3 MMOL/L (ref 3.5–5.1)
PROT SERPL-MCNC: 7.4 G/DL (ref 6.4–8.2)
RBC # BLD AUTO: 4.71 M/UL (ref 4.2–5.9)
SODIUM SERPL-SCNC: 138 MMOL/L (ref 136–145)
T4 FREE SERPL-MCNC: 1.1 NG/DL (ref 0.9–1.8)
TSH SERPL DL<=0.005 MIU/L-ACNC: 4.63 UIU/ML (ref 0.27–4.2)
WBC # BLD AUTO: 10.1 K/UL (ref 4–11)

## 2024-07-29 PROCEDURE — 99214 OFFICE O/P EST MOD 30 MIN: CPT | Performed by: INTERNAL MEDICINE

## 2024-07-29 PROCEDURE — 1123F ACP DISCUSS/DSCN MKR DOCD: CPT | Performed by: INTERNAL MEDICINE

## 2024-07-29 NOTE — PROGRESS NOTES
Provider, MD Isabel   vitamin B-6 (PYRIDOXINE) 100 MG tablet Take 1 tablet by mouth daily    Isabel Rodgers MD   calcium-vitamin D (OSCAL-500) 500-200 MG-UNIT per tablet Take 1 tablet by mouth daily    Isabel Rodgers MD   vitamin B-12 (CYANOCOBALAMIN) 1000 MCG tablet Take 1 tablet by mouth daily    Isabel Rodgers MD   simvastatin (ZOCOR) 20 MG tablet Take 0.5 tablets by mouth nightly    Shubham Mckeon MD   finasteride (PROSCAR) 5 MG tablet Take 1 tablet by mouth daily 2/7/11   Isabel Rodgers MD       Physical Exam:  Vital Signs: BP (!) 140/78   Ht 1.854 m (6' 1\")   Wt 81.6 kg (180 lb) Comment: patient reported  SpO2 93% Comment: on 6 liters of oxygen  BMI 23.75 kg/m²   General: Patient appears  non-toxic  HENT: Atraumatic, normocephalic, oral mucosa moist  Lungs:  Clear bilaterally  Heart: Regular rate and rhythm  Abdomen: Non-distended, soft, non-tender  Extremities: No edema  Neuro: Nonfocal    Medical Decision Making and Plan:  Pertinent Labs & Imaging studies reviewed. (See chart for details)  Blood studies pending    1. Hypogonadism in male  Check above labs include testosterone  - CBC with Auto Differential; Future  - Comprehensive Metabolic Panel; Future  - Testosterone male; Future  - TSH with Reflex; Future    2. Fatigue, unspecified type  As above rule out metabolic etiology  - CBC with Auto Differential; Future  - Comprehensive Metabolic Panel; Future  - Testosterone male; Future  - TSH with Reflex; Future    3. Pulmonary fibrosis (HCC)  This problem is stable remain on current meds and O2 supplementation  - CBC with Auto Differential; Future  - Comprehensive Metabolic Panel; Future  - Testosterone male; Future  - TSH with Reflex; Future    4. Stage 3a chronic kidney disease (HCC)  Check above labs above labs okay see back in 3 months

## 2024-07-31 LAB
SHBG SERPL-SCNC: 32 NMOL/L (ref 19–76)
TESTOST FREE SERPL-MCNC: 271 PG/ML (ref 47–244)
TESTOST SERPL-MCNC: 1082 NG/DL (ref 193–740)

## 2024-08-01 DIAGNOSIS — F51.01 PRIMARY INSOMNIA: ICD-10-CM

## 2024-08-02 RX ORDER — TEMAZEPAM 15 MG/1
CAPSULE ORAL
Qty: 30 CAPSULE | Refills: 0 | Status: SHIPPED | OUTPATIENT
Start: 2024-08-02 | End: 2024-09-01

## 2024-08-02 RX ORDER — OMEPRAZOLE 20 MG/1
20 CAPSULE, DELAYED RELEASE ORAL EVERY EVENING
Qty: 90 CAPSULE | Refills: 1 | Status: SHIPPED | OUTPATIENT
Start: 2024-08-02

## 2024-08-06 RX ORDER — FUROSEMIDE 40 MG/1
40 TABLET ORAL DAILY
Qty: 90 TABLET | Refills: 1 | Status: SHIPPED | OUTPATIENT
Start: 2024-08-06

## 2024-08-06 RX ORDER — VIBEGRON 75 MG/1
75 TABLET, FILM COATED ORAL DAILY
Qty: 90 TABLET | Refills: 1 | Status: SHIPPED | OUTPATIENT
Start: 2024-08-06

## 2024-08-07 ENCOUNTER — TELEPHONE (OUTPATIENT)
Dept: INTERNAL MEDICINE CLINIC | Age: 89
End: 2024-08-07

## 2024-08-07 DIAGNOSIS — F51.01 PRIMARY INSOMNIA: ICD-10-CM

## 2024-08-07 RX ORDER — TAMSULOSIN HYDROCHLORIDE 0.4 MG/1
0.4 CAPSULE ORAL DAILY
Qty: 90 CAPSULE | Refills: 1 | Status: SHIPPED | OUTPATIENT
Start: 2024-08-07

## 2024-08-07 RX ORDER — TEMAZEPAM 15 MG/1
CAPSULE ORAL
Qty: 30 CAPSULE | Refills: 0 | Status: SHIPPED | OUTPATIENT
Start: 2024-08-07 | End: 2024-09-07

## 2024-08-07 NOTE — TELEPHONE ENCOUNTER
Pts daughter mayo is requesting a refill on the pts Tamsulosin medication prescribe by pts urologists but is asking Dr. Moreau to refill.     Tamsulosin   .4mg capsules 1 by mouth @ bed time.     Advised Mayo that the medication bottle had a different doctors name on it. Will Dr. Moreau prescribe?    [phone]

## 2024-08-07 NOTE — TELEPHONE ENCOUNTER
Pts daughter is requesting a refill on medication below:    temazepam (RESTORIL) 15 MG capsule [[id number]]     [facility] #[id number] - [place], [place] - [facility] - P [phone] - F [phone]

## 2024-08-07 NOTE — TELEPHONE ENCOUNTER
Pts daughter Gudelia states there needs to be a prior auth done for patients medication below:      GEMTESA 75 MG TABS tablet [[id number]

## 2024-08-08 NOTE — TELEPHONE ENCOUNTER
Submitted PA for Gemtesa 75MG tablets  Via CM Key: [id number]  STATUS: Per [employer] - Available without authorization. The member is able to fill the requested drug at the pharmacy. If coverage is still needed or requesting prior to the expiration of a current authorization, a request can be made by sending a fax or calling the number on the back of the member's ID card.     If this requires a response please respond to the pool ( P [facility] MEDICATION PRE-AUTH).      Thank you please advise patient's caregiver.

## 2024-08-09 ENCOUNTER — TELEPHONE (OUTPATIENT)
Dept: INTERNAL MEDICINE CLINIC | Age: 89
End: 2024-08-09

## 2024-08-09 NOTE — TELEPHONE ENCOUNTER
Patient needs refills of the following medication but they are not covered by hospice as they do not relate to his diagnosis for being in hospice.     Flomax  - Flomax has been ordered    Gemtesa - Gemtesa has been ordered

## 2024-08-09 NOTE — TELEPHONE ENCOUNTER
Cassandra from [facility] is requesting to speak with Maryuri or DR. Moreau about the patients medications that are not covered through hospice.      [phone] Cassandra

## 2024-08-13 ENCOUNTER — TELEPHONE (OUTPATIENT)
Dept: INTERNAL MEDICINE CLINIC | Age: 89
End: 2024-08-13

## 2024-08-13 NOTE — TELEPHONE ENCOUNTER
Vidhya from [facility] is requesting a rescue inhaler per the pts request to be added to the pts order set for when the pt goes out for activities just as needed.    Call Vidhya with details as to what DR. Moreau would like to do [phone]

## 2024-08-14 ENCOUNTER — TELEPHONE (OUTPATIENT)
Dept: INTERNAL MEDICINE CLINIC | Age: 89
End: 2024-08-14

## 2024-08-14 NOTE — TELEPHONE ENCOUNTER
She is following up on the refill request for:    GEMTESA 75 MG TABS tablet     A PA was sent on 08/07/24 and we were notified that PA was not necessary.    The pharmacy is still saying they are waiting to hear the results from the PA.    Please call and advise, okay to leave message.    [id number] - [place], [place] - [address] - P [phone] - F [phone]

## 2024-08-15 ENCOUNTER — TELEPHONE (OUTPATIENT)
Dept: INTERNAL MEDICINE CLINIC | Age: 89
End: 2024-08-15

## 2024-08-15 NOTE — TELEPHONE ENCOUNTER
I called [employer] ([facility] for [employer]) and spoke to Zulema Dillonity said that the pharmacy got a paid claim for Gemtesa on 8/12/24.  [facility] also ran a test claim for a future date and it paid as well.  So, [facility] doesn't know what the pharmacy's problem is.  The pharmacy can contact [facility]'s Pharmacy Help Desk at [phone] if they still have an issue.

## 2024-08-15 NOTE — TELEPHONE ENCOUNTER
Pt is unable to pee and daughter taking out the medication that is used for and over active bladder. Pt had an appt with urologist and elected not to go.     Please call and advise with Gudelia

## 2024-08-26 DIAGNOSIS — N18.31 STAGE 3A CHRONIC KIDNEY DISEASE (HCC): ICD-10-CM

## 2024-08-26 DIAGNOSIS — E03.9 HYPOTHYROIDISM, UNSPECIFIED TYPE: Primary | ICD-10-CM

## 2024-08-27 ENCOUNTER — TELEPHONE (OUTPATIENT)
Dept: INTERNAL MEDICINE CLINIC | Age: 89
End: 2024-08-27

## 2024-08-27 NOTE — TELEPHONE ENCOUNTER
Vidhya from [facility] is calling to discuss the pt's nebulizer. He is not wanting to take it anymore due to how it makes him feel afterwards, light headed and a muscle twitch in his left leg.  Please call and advise Vidhya

## 2024-09-04 ENCOUNTER — TELEPHONE (OUTPATIENT)
Dept: INTERNAL MEDICINE CLINIC | Age: 89
End: 2024-09-04

## 2024-09-04 NOTE — TELEPHONE ENCOUNTER
Pt is requesting a refill on medication BELOW:      testosterone cypionate (DEPOTESTOTERONE CYPIONATE) 200 MG/ML injection [[id number]]  [id number] [place] [address] - P [phone] - F [phone]

## 2024-09-11 ENCOUNTER — TELEPHONE (OUTPATIENT)
Dept: INTERNAL MEDICINE CLINIC | Age: 89
End: 2024-09-11

## 2024-09-12 ENCOUNTER — TELEPHONE (OUTPATIENT)
Dept: INTERNAL MEDICINE CLINIC | Age: 89
End: 2024-09-12

## 2024-09-16 ENCOUNTER — TELEPHONE (OUTPATIENT)
Dept: INTERNAL MEDICINE CLINIC | Age: 89
End: 2024-09-16

## 2024-09-16 NOTE — TELEPHONE ENCOUNTER
Rosette:  Requesting to speak with Maryuri regarding her dad.  Did not provide details other than she would like to speak with us before he calls us.

## 2024-09-16 NOTE — TELEPHONE ENCOUNTER
Call returned.     Spoke to Rosette.    [facility]    He now states he is having more difficulty breathing.    He states he can't function his legs aren't working, they are hurting him and he has no balance.     He request something to relax him.

## 2024-09-24 ENCOUNTER — OFFICE VISIT (OUTPATIENT)
Dept: INTERNAL MEDICINE CLINIC | Age: 89
End: 2024-09-24
Payer: MEDICARE

## 2024-09-24 VITALS — WEIGHT: 180 LBS | DIASTOLIC BLOOD PRESSURE: 64 MMHG | SYSTOLIC BLOOD PRESSURE: 128 MMHG | BODY MASS INDEX: 23.75 KG/M2

## 2024-09-24 DIAGNOSIS — N18.4 CKD (CHRONIC KIDNEY DISEASE) STAGE 4, GFR 15-29 ML/MIN (HCC): ICD-10-CM

## 2024-09-24 DIAGNOSIS — Z23 NEED FOR INFLUENZA VACCINATION: ICD-10-CM

## 2024-09-24 DIAGNOSIS — J84.10 PULMONARY FIBROSIS (HCC): Primary | ICD-10-CM

## 2024-09-24 PROCEDURE — 99213 OFFICE O/P EST LOW 20 MIN: CPT | Performed by: INTERNAL MEDICINE

## 2024-09-24 PROCEDURE — G0008 ADMIN INFLUENZA VIRUS VAC: HCPCS | Performed by: INTERNAL MEDICINE

## 2024-09-24 PROCEDURE — 90653 IIV ADJUVANT VACCINE IM: CPT | Performed by: INTERNAL MEDICINE

## 2024-09-24 PROCEDURE — 1123F ACP DISCUSS/DSCN MKR DOCD: CPT | Performed by: INTERNAL MEDICINE

## 2024-09-24 SDOH — ECONOMIC STABILITY: INCOME INSECURITY: HOW HARD IS IT FOR YOU TO PAY FOR THE VERY BASICS LIKE FOOD, HOUSING, MEDICAL CARE, AND HEATING?: NOT HARD AT ALL

## 2024-09-24 SDOH — ECONOMIC STABILITY: FOOD INSECURITY: WITHIN THE PAST 12 MONTHS, YOU WORRIED THAT YOUR FOOD WOULD RUN OUT BEFORE YOU GOT MONEY TO BUY MORE.: NEVER TRUE

## 2024-09-24 SDOH — ECONOMIC STABILITY: FOOD INSECURITY: WITHIN THE PAST 12 MONTHS, THE FOOD YOU BOUGHT JUST DIDN'T LAST AND YOU DIDN'T HAVE MONEY TO GET MORE.: NEVER TRUE

## 2024-09-24 ASSESSMENT — PATIENT HEALTH QUESTIONNAIRE - PHQ9
SUM OF ALL RESPONSES TO PHQ QUESTIONS 1-9: 0
SUM OF ALL RESPONSES TO PHQ9 QUESTIONS 1 & 2: 0
4. FEELING TIRED OR HAVING LITTLE ENERGY: NOT AT ALL
2. FEELING DOWN, DEPRESSED OR HOPELESS: NOT AT ALL
1. LITTLE INTEREST OR PLEASURE IN DOING THINGS: NOT AT ALL
9. THOUGHTS THAT YOU WOULD BE BETTER OFF DEAD, OR OF HURTING YOURSELF: NOT AT ALL
7. TROUBLE CONCENTRATING ON THINGS, SUCH AS READING THE NEWSPAPER OR WATCHING TELEVISION: NOT AT ALL
6. FEELING BAD ABOUT YOURSELF - OR THAT YOU ARE A FAILURE OR HAVE LET YOURSELF OR YOUR FAMILY DOWN: NOT AT ALL
10. IF YOU CHECKED OFF ANY PROBLEMS, HOW DIFFICULT HAVE THESE PROBLEMS MADE IT FOR YOU TO DO YOUR WORK, TAKE CARE OF THINGS AT HOME, OR GET ALONG WITH OTHER PEOPLE: NOT DIFFICULT AT ALL
SUM OF ALL RESPONSES TO PHQ QUESTIONS 1-9: 0
SUM OF ALL RESPONSES TO PHQ QUESTIONS 1-9: 0
3. TROUBLE FALLING OR STAYING ASLEEP: NOT AT ALL
SUM OF ALL RESPONSES TO PHQ QUESTIONS 1-9: 0
8. MOVING OR SPEAKING SO SLOWLY THAT OTHER PEOPLE COULD HAVE NOTICED. OR THE OPPOSITE, BEING SO FIGETY OR RESTLESS THAT YOU HAVE BEEN MOVING AROUND A LOT MORE THAN USUAL: NOT AT ALL
5. POOR APPETITE OR OVEREATING: NOT AT ALL

## 2024-10-01 RX ORDER — LEVOFLOXACIN 250 MG/1
250 TABLET, FILM COATED ORAL DAILY
Qty: 10 TABLET | Refills: 0 | OUTPATIENT
Start: 2024-10-01 | End: 2024-10-11

## 2024-10-01 RX ORDER — NITROFURANTOIN 25; 75 MG/1; MG/1
100 CAPSULE ORAL 2 TIMES DAILY
Qty: 20 CAPSULE | Refills: 0 | Status: SHIPPED | OUTPATIENT
Start: 2024-10-01 | End: 2024-10-11

## 2024-10-01 NOTE — TELEPHONE ENCOUNTER
[facility]    [id number] - [place] [id number] - P [phone] - F [phone]     called because they received a prescription for Levofloxicine that was put in by Gregoria Mathew on 10/1/24.    Patient has an allergy to Quinoline so they can't fill that.

## 2024-10-01 NOTE — TELEPHONE ENCOUNTER
Caller is from [facility]. She says patient is positive for E-Coli from UTI check.    What prescription can be provided?    Please call Hayley Garvey at [phone] to advise.

## 2024-10-02 RX ORDER — ONDANSETRON 4 MG/1
4 TABLET, FILM COATED ORAL 3 TIMES DAILY PRN
Qty: 30 TABLET | Refills: 0 | Status: SHIPPED | OUTPATIENT
Start: 2024-10-02

## 2024-10-02 NOTE — TELEPHONE ENCOUNTER
Patient called in to report he vomitted this morning and asked to talk to Maryuri. He doesn't have a fever or other symptoms.    Please call patient to advise: [phone]

## 2024-10-04 ENCOUNTER — TELEPHONE (OUTPATIENT)
Dept: INTERNAL MEDICINE CLINIC | Age: 89
End: 2024-10-04

## 2024-10-04 NOTE — TELEPHONE ENCOUNTER
Pt states he needs to speak with Maryuri due to  him having breathing issues and feeling shaky. He is feeling really tense and can not relax. Pt states it is getting worse but this is not urgent but wants Maryuri to urgently call him.     [phone]

## 2024-10-08 ENCOUNTER — TELEPHONE (OUTPATIENT)
Dept: INTERNAL MEDICINE CLINIC | Age: 89
End: 2024-10-08

## 2024-10-08 RX ORDER — ESCITALOPRAM OXALATE 5 MG/1
5 TABLET ORAL DAILY
COMMUNITY

## 2024-10-08 NOTE — TELEPHONE ENCOUNTER
Lexapro 10 mg daily    Nurse is asking should the patient be started on Lexapro 5 mg daily.    Ok with starting with Lexapro 5 mg  daily    Ok to pull catheter if nurse feels it is appropriate.    Recommendations given.

## 2024-10-08 NOTE — TELEPHONE ENCOUNTER
Vidhya from [facility] is calling back to request ok to pull the pts randolph cath and discontinue per the pts request.     [phone]

## 2024-10-11 NOTE — TELEPHONE ENCOUNTER
Britney from [facility] called stating pt is not sleeping through the night and becomes very agitated and larazepam is not working does not last through the night    [phone] (Britney)  Pls call and advise   r/o CVA r/o CVA r/o CVA r/o CVA r/o CVA r/o CVA r/o CVA r/o CVA r/o CVA

## 2024-10-16 ENCOUNTER — TELEPHONE (OUTPATIENT)
Dept: INTERNAL MEDICINE CLINIC | Age: 89
End: 2024-10-16

## 2024-10-16 NOTE — TELEPHONE ENCOUNTER
Pt called stated who he was and stated he has a bunch of pills in front of him but no one to help him with what he is supposed to take asked that lyle call him gave number and hung up. Was unable to even get a word in before pt hung up

## 2024-10-16 NOTE — TELEPHONE ENCOUNTER
The mailbox is full and there is not enough space to leave a message.    I have tried to return the call several times.

## 2024-11-22 NOTE — PROGRESS NOTES
Internal Medicine Progress Note    Date: 1/30/2024  Patient: Juan Vigil  Hospital Day: 2    CC: Shortness of breath    Interval Hx   VSS and NAEO. 97% on 3L NC    Patient slept \"ok\" last night. He complains of burning pain on urination. He still complains of a slight cough that is sometimes productive but it is improving. He no longer has shortness of breath or nausea.    HPI:   From H&P - \"Juan Vigil is a 89 y.o. male, presented with NV. Patient has a PMHx of A-fib, BPH, ILD, pulm fibrosis.  Presenting to the ED after nausea, and a slight productive cough.  He mentions these symptoms have been ongoing for the past 2 days and have gotten progressively worse.  He denies any headaches, dizziness, lightheadedness, chest pain, shortness of breath.  He mentions that he had a coughing spell which made him vomit.     At the ED, /95.  No pertinent labs were seen.  CXR showed bilateral prominent pulmonary interstitial opacities compatible with underlying pulmonary fibrosis.  CT chest without contrast showed new alveolar consolidation in the right lower lobe concerning for pneumonia.\"    Objective     Vital Signs:  Patient Vitals for the past 8 hrs:   BP Temp Temp src Pulse Resp SpO2   01/30/24 0746 -- -- -- -- -- 98 %   01/30/24 0739 138/79 97.6 °F (36.4 °C) Oral 61 18 97 %     Physical Exam  Constitutional:       Appearance: Normal appearance. He is normal weight.   HENT:      Head: Normocephalic and atraumatic.      Nose: Nose normal. No rhinorrhea.      Mouth/Throat:      Mouth: Mucous membranes are moist.      Pharynx: Oropharynx is clear. No oropharyngeal exudate or posterior oropharyngeal erythema.   Eyes:      Extraocular Movements: Extraocular movements intact.      Conjunctiva/sclera: Conjunctivae normal.      Pupils: Pupils are equal, round, and reactive to light.   Cardiovascular:      Rate and Rhythm: Normal rate and regular rhythm.      Pulses: Normal pulses.      Heart sounds: Normal heart  2.8<3.1<2.4<2.0  - IVF yesterday, 500 mL bolus plus 1L LR infusion  - Avoid nephrotoxic medications, no NSAIDS  - Strict I/Os  - Renally dose medications    Hypothyroidism  - Continue Synthroid 112 mcg daily    HTN  - Continue home Lasix    BPH  - Continue home finasteride    Hyperuricemia  - Continue home febuxostat    DVT PPx: Xarelto  Diet: ADULT DIET; Regular  Code status:  Limited    Estimated time to discharge: Today  Disposition  - Preadmission: Home  - Current: [facility]  - Upon discharge: Home    Will discuss with attending physician Wil Oden MD.    Issa Graciae   MS3  1/30/2024, 8:36 AM    Patient seen and examined at bedside with medical student (Issa Espinosa), agree with student's note with above changes.    Rolando Lal DO  Internal Medicine, PGY-1     Addendum to Resident H& P/Progress note:  I have personally seen,examined and evaluated the patient. I have reviewed the current history, physical findings, labs and assessment and plan and agree with note as documented by MS 3, Issa Espinosa, and resident DO ( )      Wil Oden MD, FACP     Stable